# Patient Record
Sex: FEMALE | Race: WHITE | Employment: OTHER | URBAN - NONMETROPOLITAN AREA
[De-identification: names, ages, dates, MRNs, and addresses within clinical notes are randomized per-mention and may not be internally consistent; named-entity substitution may affect disease eponyms.]

---

## 2017-06-01 ENCOUNTER — OFFICE VISIT (OUTPATIENT)
Dept: NEUROLOGY | Age: 37
End: 2017-06-01
Payer: MEDICARE

## 2017-06-01 VITALS — OXYGEN SATURATION: 98 % | DIASTOLIC BLOOD PRESSURE: 84 MMHG | SYSTOLIC BLOOD PRESSURE: 131 MMHG | HEART RATE: 85 BPM

## 2017-06-01 DIAGNOSIS — R41.89 COGNITIVE DEFICITS: ICD-10-CM

## 2017-06-01 DIAGNOSIS — R53.1 WEAKNESS: Primary | ICD-10-CM

## 2017-06-01 DIAGNOSIS — R25.2 SPASTICITY: ICD-10-CM

## 2017-06-01 DIAGNOSIS — F39 MOOD DISORDER (HCC): ICD-10-CM

## 2017-06-01 DIAGNOSIS — S06.9X9S TRAUMATIC BRAIN INJURY, WITH LOSS OF CONSCIOUSNESS OF UNSPECIFIED DURATION, SEQUELA: ICD-10-CM

## 2017-06-01 PROBLEM — S06.9XAA TRAUMATIC BRAIN INJURY: Status: ACTIVE | Noted: 2017-06-01

## 2017-06-01 PROCEDURE — G8421 BMI NOT CALCULATED: HCPCS | Performed by: PSYCHIATRY & NEUROLOGY

## 2017-06-01 PROCEDURE — G8427 DOCREV CUR MEDS BY ELIG CLIN: HCPCS | Performed by: PSYCHIATRY & NEUROLOGY

## 2017-06-01 PROCEDURE — 99204 OFFICE O/P NEW MOD 45 MIN: CPT | Performed by: PSYCHIATRY & NEUROLOGY

## 2017-06-01 PROCEDURE — 4004F PT TOBACCO SCREEN RCVD TLK: CPT | Performed by: PSYCHIATRY & NEUROLOGY

## 2017-06-01 RX ORDER — BACLOFEN 20 MG/1
20 TABLET ORAL 4 TIMES DAILY
COMMUNITY

## 2017-06-01 RX ORDER — LANOLIN ALCOHOL/MO/W.PET/CERES
3 CREAM (GRAM) TOPICAL NIGHTLY PRN
COMMUNITY

## 2017-06-01 RX ORDER — TOLTERODINE 4 MG/1
4 CAPSULE, EXTENDED RELEASE ORAL DAILY
COMMUNITY

## 2017-06-01 RX ORDER — FLUOXETINE HYDROCHLORIDE 20 MG/1
20 CAPSULE ORAL DAILY
COMMUNITY

## 2017-06-01 RX ORDER — GABAPENTIN 300 MG/1
300 CAPSULE ORAL 3 TIMES DAILY
COMMUNITY

## 2017-06-01 RX ORDER — PREDNISONE 1 MG/1
1 TABLET ORAL DAILY
COMMUNITY

## 2017-06-01 RX ORDER — IBUPROFEN 600 MG/1
600 TABLET ORAL EVERY 8 HOURS PRN
COMMUNITY

## 2017-06-01 RX ORDER — TRAZODONE HYDROCHLORIDE 150 MG/1
150 TABLET ORAL NIGHTLY
COMMUNITY

## 2017-06-01 RX ORDER — BACLOFEN 10 MG/1
10 TABLET ORAL 3 TIMES DAILY
COMMUNITY

## 2017-06-01 RX ORDER — DANTROLENE SODIUM 25 MG/1
25 CAPSULE ORAL 3 TIMES DAILY
COMMUNITY

## 2017-06-01 RX ORDER — LORAZEPAM 2 MG/1
2 TABLET ORAL NIGHTLY
COMMUNITY

## 2017-06-13 ENCOUNTER — HOSPITAL ENCOUNTER (OUTPATIENT)
Dept: PAIN MANAGEMENT | Age: 37
Discharge: HOME | End: 2017-06-13
Payer: MEDICARE

## 2017-06-13 VITALS
DIASTOLIC BLOOD PRESSURE: 70 MMHG | TEMPERATURE: 99.1 F | SYSTOLIC BLOOD PRESSURE: 104 MMHG | RESPIRATION RATE: 18 BRPM | HEART RATE: 97 BPM | OXYGEN SATURATION: 96 %

## 2017-06-13 PROCEDURE — 99203 OFFICE O/P NEW LOW 30 MIN: CPT

## 2017-06-13 RX ORDER — FLUOXETINE HYDROCHLORIDE 40 MG/1
40 CAPSULE ORAL DAILY
COMMUNITY

## 2017-06-13 ASSESSMENT — PAIN DESCRIPTION - FREQUENCY: FREQUENCY: CONTINUOUS

## 2017-06-13 ASSESSMENT — PAIN DESCRIPTION - PROGRESSION: CLINICAL_PROGRESSION: GRADUALLY WORSENING

## 2017-06-13 ASSESSMENT — PAIN DESCRIPTION - PAIN TYPE: TYPE: CHRONIC PAIN

## 2017-06-13 ASSESSMENT — PAIN DESCRIPTION - DIRECTION: RADIATING_TOWARDS: ALL OVER

## 2017-06-13 ASSESSMENT — PAIN SCALES - GENERAL: PAINLEVEL_OUTOF10: 9

## 2017-06-13 ASSESSMENT — PAIN DESCRIPTION - ONSET: ONSET: ON-GOING

## 2017-06-13 ASSESSMENT — PAIN DESCRIPTION - ORIENTATION: ORIENTATION: RIGHT;LEFT;LOWER;UPPER

## 2017-07-12 ENCOUNTER — HOSPITAL ENCOUNTER (OUTPATIENT)
Dept: PAIN MANAGEMENT | Age: 37
Discharge: HOME | End: 2017-07-12
Payer: MEDICARE

## 2017-07-12 DIAGNOSIS — R25.2 SPASTICITY: Chronic | ICD-10-CM

## 2017-07-12 PROCEDURE — 6360000002 HC RX W HCPCS

## 2017-07-12 PROCEDURE — 64646 CHEMODENERV TRUNK MUSC 1-5: CPT

## 2017-07-12 PROCEDURE — 64644 CHEMODENERV 1 EXTREM 5/> MUS: CPT

## 2017-07-12 PROCEDURE — 64642 CHEMODENERV 1 EXTREMITY 1-4: CPT

## 2017-07-12 PROCEDURE — 64616 CHEMODENERV MUSC NECK DYSTON: CPT

## 2017-07-12 PROCEDURE — 2500000003 HC RX 250 WO HCPCS

## 2017-07-12 RX ORDER — BUPIVACAINE HYDROCHLORIDE 2.5 MG/ML
INJECTION, SOLUTION EPIDURAL; INFILTRATION; INTRACAUDAL
Status: COMPLETED | OUTPATIENT
Start: 2017-07-12 | End: 2017-07-12

## 2017-07-12 RX ADMIN — BUPIVACAINE HYDROCHLORIDE 20 ML: 2.5 INJECTION, SOLUTION EPIDURAL; INFILTRATION; INTRACAUDAL at 14:30

## 2017-09-22 DIAGNOSIS — D69.6 THROMBOCYTOPENIA (HCC): Primary | ICD-10-CM

## 2017-10-03 ENCOUNTER — APPOINTMENT (OUTPATIENT)
Dept: LAB | Facility: HOSPITAL | Age: 37
End: 2017-10-03

## 2017-10-04 ENCOUNTER — APPOINTMENT (OUTPATIENT)
Dept: GENERAL RADIOLOGY | Facility: HOSPITAL | Age: 37
End: 2017-10-04

## 2017-10-04 ENCOUNTER — HOSPITAL ENCOUNTER (INPATIENT)
Facility: HOSPITAL | Age: 37
LOS: 15 days | Discharge: NURSING, REHAB OR LONG-TERM CARE | End: 2017-10-19
Attending: FAMILY MEDICINE | Admitting: INTERNAL MEDICINE

## 2017-10-04 DIAGNOSIS — R13.12 OROPHARYNGEAL DYSPHAGIA: Primary | ICD-10-CM

## 2017-10-04 DIAGNOSIS — Z74.09 IMPAIRED MOBILITY AND ADLS: ICD-10-CM

## 2017-10-04 DIAGNOSIS — Z78.9 IMPAIRED MOBILITY AND ADLS: ICD-10-CM

## 2017-10-04 DIAGNOSIS — Z74.09 MOBILITY IMPAIRED: ICD-10-CM

## 2017-10-04 DIAGNOSIS — R41.89 COGNITIVE IMPAIRMENT: ICD-10-CM

## 2017-10-04 PROBLEM — I63.9 CVA (CEREBRAL VASCULAR ACCIDENT) (HCC): Status: ACTIVE | Noted: 2017-10-04

## 2017-10-04 LAB
ANION GAP SERPL CALCULATED.3IONS-SCNC: 15 MMOL/L (ref 4–13)
ARTERIAL PATENCY WRIST A: POSITIVE
ATMOSPHERIC PRESS: 758 MMHG
BASE EXCESS BLDA CALC-SCNC: 3.2 MMOL/L (ref 0–2)
BASOPHILS # BLD AUTO: 0.05 10*3/MM3 (ref 0–0.2)
BASOPHILS NFR BLD AUTO: 0.4 % (ref 0–2)
BDY SITE: ABNORMAL
BODY TEMPERATURE: 37 C
BUN BLD-MCNC: 14 MG/DL (ref 5–21)
BUN/CREAT SERPL: 22.2 (ref 7–25)
CALCIUM SPEC-SCNC: 8.7 MG/DL (ref 8.4–10.4)
CHLORIDE SERPL-SCNC: 100 MMOL/L (ref 98–110)
CO2 SERPL-SCNC: 24 MMOL/L (ref 24–31)
CREAT BLD-MCNC: 0.63 MG/DL (ref 0.5–1.4)
D-LACTATE SERPL-SCNC: 2.2 MMOL/L (ref 0.5–2)
DEPRECATED RDW RBC AUTO: 52.8 FL (ref 40–54)
EOSINOPHIL # BLD AUTO: 0 10*3/MM3 (ref 0–0.7)
EOSINOPHIL NFR BLD AUTO: 0 % (ref 0–4)
ERYTHROCYTE [DISTWIDTH] IN BLOOD BY AUTOMATED COUNT: 15 % (ref 12–15)
GAS FLOW AIRWAY: 2 LPM
GFR SERPL CREATININE-BSD FRML MDRD: 106 ML/MIN/1.73
GLUCOSE BLD-MCNC: 113 MG/DL (ref 70–100)
HCO3 BLDA-SCNC: 26.3 MMOL/L (ref 20–26)
HCT VFR BLD AUTO: 36.8 % (ref 37–47)
HGB BLD-MCNC: 12.1 G/DL (ref 12–16)
HOLD SPECIMEN: NORMAL
IMM GRANULOCYTES # BLD: 0.05 10*3/MM3 (ref 0–0.03)
IMM GRANULOCYTES NFR BLD: 0.4 % (ref 0–5)
LYMPHOCYTES # BLD AUTO: 1.23 10*3/MM3 (ref 0.72–4.86)
LYMPHOCYTES NFR BLD AUTO: 9.7 % (ref 15–45)
Lab: ABNORMAL
MCH RBC QN AUTO: 31.7 PG (ref 28–32)
MCHC RBC AUTO-ENTMCNC: 32.9 G/DL (ref 33–36)
MCV RBC AUTO: 96.3 FL (ref 82–98)
MODALITY: ABNORMAL
MONOCYTES # BLD AUTO: 0.28 10*3/MM3 (ref 0.19–1.3)
MONOCYTES NFR BLD AUTO: 2.2 % (ref 4–12)
NEUTROPHILS # BLD AUTO: 11.13 10*3/MM3 (ref 1.87–8.4)
NEUTROPHILS NFR BLD AUTO: 87.3 % (ref 39–78)
NT-PROBNP SERPL-MCNC: 483 PG/ML (ref 0–450)
PCO2 BLDA: 34.3 MM HG (ref 35–45)
PCO2 TEMP ADJ BLD: 34.3 MM HG (ref 35–45)
PH BLDA: 7.49 PH UNITS (ref 7.35–7.45)
PH, TEMP CORRECTED: 7.49 PH UNITS (ref 7.35–7.45)
PLAT MORPH BLD: NORMAL
PLATELET # BLD AUTO: 170 10*3/MM3 (ref 130–400)
PMV BLD AUTO: 11.9 FL (ref 6–12)
PO2 BLDA: 80 MM HG (ref 83–108)
PO2 TEMP ADJ BLD: 80 MM HG (ref 83–108)
POTASSIUM BLD-SCNC: 4 MMOL/L (ref 3.5–5.3)
RBC # BLD AUTO: 3.82 10*6/MM3 (ref 4.2–5.4)
RBC MORPH BLD: NORMAL
SAO2 % BLDCOA: 94.7 % (ref 94–99)
SODIUM BLD-SCNC: 139 MMOL/L (ref 135–145)
VENTILATOR MODE: ABNORMAL
WBC MORPH BLD: NORMAL
WBC NRBC COR # BLD: 12.74 10*3/MM3 (ref 4.8–10.8)

## 2017-10-04 PROCEDURE — 83605 ASSAY OF LACTIC ACID: CPT | Performed by: FAMILY MEDICINE

## 2017-10-04 PROCEDURE — 87070 CULTURE OTHR SPECIMN AEROBIC: CPT | Performed by: FAMILY MEDICINE

## 2017-10-04 PROCEDURE — 83880 ASSAY OF NATRIURETIC PEPTIDE: CPT | Performed by: FAMILY MEDICINE

## 2017-10-04 PROCEDURE — 82803 BLOOD GASES ANY COMBINATION: CPT

## 2017-10-04 PROCEDURE — 80048 BASIC METABOLIC PNL TOTAL CA: CPT | Performed by: FAMILY MEDICINE

## 2017-10-04 PROCEDURE — 36600 WITHDRAWAL OF ARTERIAL BLOOD: CPT

## 2017-10-04 PROCEDURE — 71010 HC CHEST PA OR AP: CPT

## 2017-10-04 PROCEDURE — 85007 BL SMEAR W/DIFF WBC COUNT: CPT | Performed by: FAMILY MEDICINE

## 2017-10-04 PROCEDURE — 87040 BLOOD CULTURE FOR BACTERIA: CPT | Performed by: INTERNAL MEDICINE

## 2017-10-04 PROCEDURE — 85025 COMPLETE CBC W/AUTO DIFF WBC: CPT | Performed by: FAMILY MEDICINE

## 2017-10-04 PROCEDURE — 87205 SMEAR GRAM STAIN: CPT | Performed by: FAMILY MEDICINE

## 2017-10-04 PROCEDURE — 87086 URINE CULTURE/COLONY COUNT: CPT | Performed by: FAMILY MEDICINE

## 2017-10-04 PROCEDURE — 25010000002 ENOXAPARIN PER 10 MG: Performed by: FAMILY MEDICINE

## 2017-10-04 RX ORDER — ONDANSETRON 2 MG/ML
4 INJECTION INTRAMUSCULAR; INTRAVENOUS EVERY 6 HOURS PRN
Status: DISCONTINUED | OUTPATIENT
Start: 2017-10-04 | End: 2017-10-05 | Stop reason: SDUPTHER

## 2017-10-04 RX ORDER — DEXTROSE, SODIUM CHLORIDE, AND POTASSIUM CHLORIDE 5; .45; .15 G/100ML; G/100ML; G/100ML
100 INJECTION INTRAVENOUS CONTINUOUS
Status: DISCONTINUED | OUTPATIENT
Start: 2017-10-04 | End: 2017-10-12

## 2017-10-04 RX ORDER — ACETAMINOPHEN 650 MG/1
650 SUPPOSITORY RECTAL EVERY 4 HOURS PRN
Status: DISCONTINUED | OUTPATIENT
Start: 2017-10-04 | End: 2017-10-19 | Stop reason: HOSPADM

## 2017-10-04 RX ORDER — CLINDAMYCIN PHOSPHATE 300 MG/50ML
300 INJECTION INTRAVENOUS EVERY 8 HOURS
Status: DISCONTINUED | OUTPATIENT
Start: 2017-10-04 | End: 2017-10-13

## 2017-10-04 RX ORDER — SODIUM CHLORIDE 0.9 % (FLUSH) 0.9 %
1-10 SYRINGE (ML) INJECTION AS NEEDED
Status: DISCONTINUED | OUTPATIENT
Start: 2017-10-04 | End: 2017-10-19 | Stop reason: HOSPADM

## 2017-10-04 RX ADMIN — CLINDAMYCIN PHOSPHATE 300 MG: 6 INJECTION, SOLUTION INTRAVENOUS at 20:31

## 2017-10-04 RX ADMIN — ENOXAPARIN SODIUM 40 MG: 40 INJECTION SUBCUTANEOUS at 22:42

## 2017-10-04 RX ADMIN — DEXTROSE MONOHYDRATE, SODIUM CHLORIDE, AND POTASSIUM CHLORIDE 100 ML/HR: 50; 4.5; 1.49 INJECTION, SOLUTION INTRAVENOUS at 20:31

## 2017-10-04 NOTE — H&P
[facility] Services  HISTORY AND PHYSICAL    Date of Admission: 10/4/2017  Primary Care Physician: ROBERTH Beckham    Subjective     Chief Complaint: slurred speech, CVA at [facility], transferred for worsening respiratory issues, fever and PNA    History of Present Illness  Is a 36-year-old white female with known past history of traumatic brain injury and left-sided weakness essentially mobile only with assistance and in a motorized wheelchair who was taken to the hospital after having increasing left-sided weakness and slurring of her speech.  This had began sometime overnight from an unknown time frame.  Patient had initial head CT scan done at outlying facility that was stable with no acute changes however never revealed a fluid collection in the subdural space.  Patient was unable to tolerate an MRI due to some agitation therefore Ativan was given.  On arrival patient had been stable and initial CT of her chest was also fairly unremarkable.  Her vital signs had been okay as well as her labs with a normal white count and creatinine.  She did have some issues with low sodium around 1:30.  Patient did have some question of atypical pneumonia on presentation her CT chest and actual repeat CT of chest several days later did reveal worsening of this presentation.  She was being treated with IV Rocephin.  When she received some Ativan for sedation of MRI apparently her respiratory status digressed somewhat.  She began having increased secretions requiring frequent suctioning.  There was some concern she may continue to deteriorate and transfer was requested to our facility.  Patient was stable on nasal cannula in regards to her oxygen saturations.  She was febrile overnight at their facility again felt to be secondary to pneumonia.  Her MRI did reveal possible acute versus subacute infarct.  Again patient had not been treated with any TPA as her last normal was unknown.  On  arrival to our facility patient was stable in regards to her vital signs.  She was very congested with excessive secretions.  She was unable to have a meaningful conversation with me as her speech is garbled at this time.  Per the sitter who is with her and us with her her facility this is a change from her baseline however this is similar to how he saw her 2 days previously.  She does have a Walsh catheter in place.        Review of Systems   Unable to obtain as cannot understand patients speech      Past Medical History:   Past Medical History:   Diagnosis Date   • Depression    • Left-sided weakness    • Lupus    • MRSA (methicillin resistant staph aureus) culture positive    • TBI (traumatic brain injury)        Past Surgical History:  Past Surgical History:   Procedure Laterality Date   •  SECTION     • TRACHEOSTOMY         Social History:  reports that she has been smoking.  She does not have any smokeless tobacco history on file.    Family History: family history includes Hypertension in her mother.       Allergies:  Allergies not on file    Medications:  Prior to Admission medications    Not on File       Objective     Vital Signs: There were no vitals taken for this visit.  Physical Exam   Constitutional:   Congested, awake but no meaningful interaction, cannot communicate with me, no distress   HENT:   Head: Normocephalic and atraumatic.   Eyes: Conjunctivae are normal.   Neck: Neck supple. No JVD present. No thyromegaly present.   Cardiovascular: Normal rate, regular rhythm, normal heart sounds and intact distal pulses.  Exam reveals no gallop and no friction rub.    No murmur heard.  Pulmonary/Chest: Effort normal. No respiratory distress. She has wheezes. She has rales. She exhibits no tenderness.   Secretions, congested   Abdominal: Soft. Bowel sounds are normal. She exhibits no distension. There is no tenderness. There is no rebound and no guarding.   Musculoskeletal: Normal range of motion.  She exhibits no edema, tenderness or deformity.   Contractures of left arm. Moving right arm. Dressing over right elbow area with active drainage   Lymphadenopathy:     She has no cervical adenopathy.   Neurological: She is alert. She displays normal reflexes. No cranial nerve deficit. She exhibits normal muscle tone.   Skin: Skin is warm and dry. No rash noted.           Results Reviewed:  Lab Results (last 24 hours)     ** No results found for the last 24 hours. **        Imaging Results (last 24 hours)     ** No results found for the last 24 hours. **          I have personally reviewed and interpreted the radiology studies and ECG obtained at time of admission.     Assessment / Plan     Assessment & Plan  Hospital Problem List     CVA (cerebral vascular accident)        1. Hx of TBI-hard to know exact baseline  2. Reports of  Acute vs subacute CVA internal capsule from [facility] MRI. I . Sx happened sometime sat night overnight. No TPA administered. Consult PT/ST/OT. Consult neuro  3. Evidence of pna on CT chest at [facility]. Fever there reported overnight. Has been on rocephin there. Will check CXR here. Cultures. Give clinda for likely aspiration. Suctioning. Nebs. May benefit from vest therapy but evidence of rib fx's  4. Mildly displaced right rib fractures  5. History of htn-bp currently stable  6. Check abgs  7. Am labs        Code Status: full     I discussed the patients findings and my recommendations with sitter, nurse     Estimated length of stay 2-4 days    Latrice Guillen MD   10/04/17   6:28 PM

## 2017-10-05 ENCOUNTER — APPOINTMENT (OUTPATIENT)
Dept: ULTRASOUND IMAGING | Facility: HOSPITAL | Age: 37
End: 2017-10-05

## 2017-10-05 ENCOUNTER — APPOINTMENT (OUTPATIENT)
Dept: CARDIOLOGY | Facility: HOSPITAL | Age: 37
End: 2017-10-05
Attending: PSYCHIATRY & NEUROLOGY

## 2017-10-05 LAB
ANION GAP SERPL CALCULATED.3IONS-SCNC: 7 MMOL/L (ref 4–13)
ARTICHOKE IGE QN: 63 MG/DL (ref 0–99)
BASOPHILS # BLD AUTO: 0.01 10*3/MM3 (ref 0–0.2)
BASOPHILS NFR BLD AUTO: 0.1 % (ref 0–2)
BH CV ECHO MEAS - AO MAX PG (FULL): 3.3 MMHG
BH CV ECHO MEAS - AO MAX PG: 7.6 MMHG
BH CV ECHO MEAS - AO MEAN PG (FULL): 1 MMHG
BH CV ECHO MEAS - AO MEAN PG: 3 MMHG
BH CV ECHO MEAS - AO ROOT AREA (BSA CORRECTED): 1.5
BH CV ECHO MEAS - AO ROOT AREA: 5.5 CM^2
BH CV ECHO MEAS - AO ROOT DIAM: 2.7 CM
BH CV ECHO MEAS - AO V2 MAX: 138 CM/SEC
BH CV ECHO MEAS - AO V2 MEAN: 76.7 CM/SEC
BH CV ECHO MEAS - AO V2 VTI: 28.9 CM
BH CV ECHO MEAS - AVA(I,A): 3.6 CM^2
BH CV ECHO MEAS - AVA(I,D): 3.6 CM^2
BH CV ECHO MEAS - AVA(V,A): 3.1 CM^2
BH CV ECHO MEAS - AVA(V,D): 3.1 CM^2
BH CV ECHO MEAS - BSA(HAYCOCK): 1.8 M^2
BH CV ECHO MEAS - BSA: 1.8 M^2
BH CV ECHO MEAS - BZI_BMI: 22.9 KILOGRAMS/M^2
BH CV ECHO MEAS - BZI_METRIC_HEIGHT: 170.2 CM
BH CV ECHO MEAS - BZI_METRIC_WEIGHT: 66.2 KG
BH CV ECHO MEAS - CONTRAST EF 4CH: 70.1 ML/M^2
BH CV ECHO MEAS - EDV(CUBED): 53.2 ML
BH CV ECHO MEAS - EDV(MOD-SP4): 94.6 ML
BH CV ECHO MEAS - EDV(TEICH): 60.4 ML
BH CV ECHO MEAS - EF(CUBED): 80.2 %
BH CV ECHO MEAS - EF(TEICH): 73.5 %
BH CV ECHO MEAS - ESV(CUBED): 10.5 ML
BH CV ECHO MEAS - ESV(MOD-SP4): 28.3 ML
BH CV ECHO MEAS - ESV(TEICH): 16 ML
BH CV ECHO MEAS - FS: 41.8 %
BH CV ECHO MEAS - IVS/LVPW: 0.83
BH CV ECHO MEAS - IVSD: 1.1 CM
BH CV ECHO MEAS - LA DIMENSION: 2.7 CM
BH CV ECHO MEAS - LA/AO: 1
BH CV ECHO MEAS - LAT PEAK E' VEL: 8.3 CM/SEC
BH CV ECHO MEAS - LV DIASTOLIC VOL/BSA (35-75): 53.5 ML/M^2
BH CV ECHO MEAS - LV MASS(C)D: 155.6 GRAMS
BH CV ECHO MEAS - LV MASS(C)DI: 88 GRAMS/M^2
BH CV ECHO MEAS - LV MAX PG: 4.3 MMHG
BH CV ECHO MEAS - LV MEAN PG: 2 MMHG
BH CV ECHO MEAS - LV SYSTOLIC VOL/BSA (12-30): 16 ML/M^2
BH CV ECHO MEAS - LV V1 MAX: 104 CM/SEC
BH CV ECHO MEAS - LV V1 MEAN: 67.7 CM/SEC
BH CV ECHO MEAS - LV V1 VTI: 24.9 CM
BH CV ECHO MEAS - LVIDD: 3.8 CM
BH CV ECHO MEAS - LVIDS: 2.2 CM
BH CV ECHO MEAS - LVLD AP4: 6.7 CM
BH CV ECHO MEAS - LVLS AP4: 5.9 CM
BH CV ECHO MEAS - LVOT AREA (M): 4.2 CM^2
BH CV ECHO MEAS - LVOT AREA: 4.2 CM^2
BH CV ECHO MEAS - LVOT DIAM: 2.3 CM
BH CV ECHO MEAS - LVPWD: 1.3 CM
BH CV ECHO MEAS - MED PEAK E' VEL: 10.2 CM/SEC
BH CV ECHO MEAS - MV A MAX VEL: 67.9 CM/SEC
BH CV ECHO MEAS - MV DEC TIME: 0.26 SEC
BH CV ECHO MEAS - MV E MAX VEL: 67.9 CM/SEC
BH CV ECHO MEAS - MV E/A: 1
BH CV ECHO MEAS - SI(AO): 90.1 ML/M^2
BH CV ECHO MEAS - SI(CUBED): 24.1 ML/M^2
BH CV ECHO MEAS - SI(LVOT): 58.5 ML/M^2
BH CV ECHO MEAS - SI(MOD-SP4): 37.5 ML/M^2
BH CV ECHO MEAS - SI(TEICH): 25.1 ML/M^2
BH CV ECHO MEAS - SV(AO): 159.4 ML
BH CV ECHO MEAS - SV(CUBED): 42.7 ML
BH CV ECHO MEAS - SV(LVOT): 103.5 ML
BH CV ECHO MEAS - SV(MOD-SP4): 66.3 ML
BH CV ECHO MEAS - SV(TEICH): 44.4 ML
BUN BLD-MCNC: 18 MG/DL (ref 5–21)
BUN/CREAT SERPL: 34.6 (ref 7–25)
CALCIUM SPEC-SCNC: 8.3 MG/DL (ref 8.4–10.4)
CHLORIDE SERPL-SCNC: 105 MMOL/L (ref 98–110)
CHOLEST SERPL-MCNC: 116 MG/DL (ref 130–200)
CO2 SERPL-SCNC: 26 MMOL/L (ref 24–31)
CREAT BLD-MCNC: 0.52 MG/DL (ref 0.5–1.4)
D-LACTATE SERPL-SCNC: 1.3 MMOL/L (ref 0.5–2)
DEPRECATED RDW RBC AUTO: 51.2 FL (ref 40–54)
E/E' RATIO: 6.7
EOSINOPHIL # BLD AUTO: 0 10*3/MM3 (ref 0–0.7)
EOSINOPHIL NFR BLD AUTO: 0 % (ref 0–4)
ERYTHROCYTE [DISTWIDTH] IN BLOOD BY AUTOMATED COUNT: 14.9 % (ref 12–15)
GFR SERPL CREATININE-BSD FRML MDRD: 132 ML/MIN/1.73
GLUCOSE BLD-MCNC: 99 MG/DL (ref 70–100)
GLUCOSE BLDC GLUCOMTR-MCNC: 107 MG/DL (ref 70–130)
GLUCOSE BLDC GLUCOMTR-MCNC: 108 MG/DL (ref 70–130)
HBA1C MFR BLD: 5.4 %
HCT VFR BLD AUTO: 32.7 % (ref 37–47)
HDLC SERPL-MCNC: 24 MG/DL
HGB BLD-MCNC: 10.8 G/DL (ref 12–16)
IMM GRANULOCYTES # BLD: 0.05 10*3/MM3 (ref 0–0.03)
IMM GRANULOCYTES NFR BLD: 0.5 % (ref 0–5)
LDLC/HDLC SERPL: 2.56 {RATIO}
LYMPHOCYTES # BLD AUTO: 2.07 10*3/MM3 (ref 0.72–4.86)
LYMPHOCYTES NFR BLD AUTO: 19.8 % (ref 15–45)
MCH RBC QN AUTO: 31.8 PG (ref 28–32)
MCHC RBC AUTO-ENTMCNC: 33 G/DL (ref 33–36)
MCV RBC AUTO: 96.2 FL (ref 82–98)
MONOCYTES # BLD AUTO: 1.13 10*3/MM3 (ref 0.19–1.3)
MONOCYTES NFR BLD AUTO: 10.8 % (ref 4–12)
NEUTROPHILS # BLD AUTO: 7.21 10*3/MM3 (ref 1.87–8.4)
NEUTROPHILS NFR BLD AUTO: 68.8 % (ref 39–78)
NRBC BLD MANUAL-RTO: 0 /100 WBC (ref 0–0)
PLAT MORPH BLD: NORMAL
PLATELET # BLD AUTO: 161 10*3/MM3 (ref 130–400)
PMV BLD AUTO: 12 FL (ref 6–12)
POTASSIUM BLD-SCNC: 3.7 MMOL/L (ref 3.5–5.3)
RBC # BLD AUTO: 3.4 10*6/MM3 (ref 4.2–5.4)
RBC MORPH BLD: NORMAL
SODIUM BLD-SCNC: 138 MMOL/L (ref 135–145)
TRIGL SERPL-MCNC: 153 MG/DL (ref 0–149)
WBC MORPH BLD: NORMAL
WBC NRBC COR # BLD: 10.47 10*3/MM3 (ref 4.8–10.8)

## 2017-10-05 PROCEDURE — 85025 COMPLETE CBC W/AUTO DIFF WBC: CPT | Performed by: FAMILY MEDICINE

## 2017-10-05 PROCEDURE — 87070 CULTURE OTHR SPECIMN AEROBIC: CPT | Performed by: FAMILY MEDICINE

## 2017-10-05 PROCEDURE — 80048 BASIC METABOLIC PNL TOTAL CA: CPT | Performed by: FAMILY MEDICINE

## 2017-10-05 PROCEDURE — 83605 ASSAY OF LACTIC ACID: CPT | Performed by: FAMILY MEDICINE

## 2017-10-05 PROCEDURE — 87205 SMEAR GRAM STAIN: CPT | Performed by: FAMILY MEDICINE

## 2017-10-05 PROCEDURE — 92610 EVALUATE SWALLOWING FUNCTION: CPT | Performed by: SPEECH-LANGUAGE PATHOLOGIST

## 2017-10-05 PROCEDURE — 82962 GLUCOSE BLOOD TEST: CPT

## 2017-10-05 PROCEDURE — 97167 OT EVAL HIGH COMPLEX 60 MIN: CPT | Performed by: OCCUPATIONAL THERAPIST

## 2017-10-05 PROCEDURE — 99223 1ST HOSP IP/OBS HIGH 75: CPT | Performed by: PSYCHIATRY & NEUROLOGY

## 2017-10-05 PROCEDURE — 93880 EXTRACRANIAL BILAT STUDY: CPT | Performed by: SURGERY

## 2017-10-05 PROCEDURE — G8978 MOBILITY CURRENT STATUS: HCPCS | Performed by: PHYSICAL THERAPIST

## 2017-10-05 PROCEDURE — 25010000002 FUROSEMIDE PER 20 MG: Performed by: FAMILY MEDICINE

## 2017-10-05 PROCEDURE — G8979 MOBILITY GOAL STATUS: HCPCS | Performed by: PHYSICAL THERAPIST

## 2017-10-05 PROCEDURE — G8980 MOBILITY D/C STATUS: HCPCS | Performed by: PHYSICAL THERAPIST

## 2017-10-05 PROCEDURE — 83036 HEMOGLOBIN GLYCOSYLATED A1C: CPT | Performed by: FAMILY MEDICINE

## 2017-10-05 PROCEDURE — 25010000002 ENOXAPARIN PER 10 MG: Performed by: FAMILY MEDICINE

## 2017-10-05 PROCEDURE — 97163 PT EVAL HIGH COMPLEX 45 MIN: CPT

## 2017-10-05 PROCEDURE — 93306 TTE W/DOPPLER COMPLETE: CPT

## 2017-10-05 PROCEDURE — 80061 LIPID PANEL: CPT | Performed by: FAMILY MEDICINE

## 2017-10-05 PROCEDURE — G8996 SWALLOW CURRENT STATUS: HCPCS | Performed by: SPEECH-LANGUAGE PATHOLOGIST

## 2017-10-05 PROCEDURE — 85007 BL SMEAR W/DIFF WBC COUNT: CPT | Performed by: FAMILY MEDICINE

## 2017-10-05 PROCEDURE — G8997 SWALLOW GOAL STATUS: HCPCS | Performed by: SPEECH-LANGUAGE PATHOLOGIST

## 2017-10-05 PROCEDURE — G8988 SELF CARE GOAL STATUS: HCPCS | Performed by: OCCUPATIONAL THERAPIST

## 2017-10-05 PROCEDURE — 93880 EXTRACRANIAL BILAT STUDY: CPT

## 2017-10-05 PROCEDURE — G8987 SELF CARE CURRENT STATUS: HCPCS | Performed by: OCCUPATIONAL THERAPIST

## 2017-10-05 PROCEDURE — 93306 TTE W/DOPPLER COMPLETE: CPT | Performed by: INTERNAL MEDICINE

## 2017-10-05 RX ORDER — LORAZEPAM 2 MG/1
2 TABLET ORAL NIGHTLY
COMMUNITY
End: 2017-10-19 | Stop reason: HOSPADM

## 2017-10-05 RX ORDER — MEDROXYPROGESTERONE ACETATE 150 MG/ML
150 INJECTION, SUSPENSION INTRAMUSCULAR
COMMUNITY
End: 2017-10-19 | Stop reason: HOSPADM

## 2017-10-05 RX ORDER — IBUPROFEN 600 MG/1
600 TABLET ORAL NIGHTLY
COMMUNITY
End: 2017-10-19 | Stop reason: HOSPADM

## 2017-10-05 RX ORDER — FUROSEMIDE 10 MG/ML
20 INJECTION INTRAMUSCULAR; INTRAVENOUS DAILY
Status: DISCONTINUED | OUTPATIENT
Start: 2017-10-05 | End: 2017-10-11

## 2017-10-05 RX ORDER — TRAZODONE HYDROCHLORIDE 150 MG/1
150 TABLET ORAL NIGHTLY
COMMUNITY
End: 2017-10-19 | Stop reason: HOSPADM

## 2017-10-05 RX ORDER — FLUOXETINE 10 MG/1
10 CAPSULE ORAL DAILY
COMMUNITY

## 2017-10-05 RX ORDER — FLUOXETINE HYDROCHLORIDE 60 MG/1
10 TABLET, FILM COATED ORAL; ORAL DAILY
Status: ON HOLD | COMMUNITY
End: 2017-10-05

## 2017-10-05 RX ORDER — ASPIRIN 300 MG/1
300 SUPPOSITORY RECTAL DAILY
Status: DISCONTINUED | OUTPATIENT
Start: 2017-10-05 | End: 2017-10-19 | Stop reason: HOSPADM

## 2017-10-05 RX ORDER — GABAPENTIN 600 MG/1
600 TABLET ORAL 3 TIMES DAILY
COMMUNITY
End: 2017-10-19 | Stop reason: HOSPADM

## 2017-10-05 RX ORDER — IBUPROFEN 600 MG/1
600 TABLET ORAL EVERY 8 HOURS PRN
COMMUNITY
End: 2017-10-19 | Stop reason: HOSPADM

## 2017-10-05 RX ORDER — ONDANSETRON 2 MG/ML
4 INJECTION INTRAMUSCULAR; INTRAVENOUS EVERY 6 HOURS PRN
Status: DISCONTINUED | OUTPATIENT
Start: 2017-10-05 | End: 2017-10-19 | Stop reason: HOSPADM

## 2017-10-05 RX ORDER — BACLOFEN 20 MG/1
30 TABLET ORAL 2 TIMES DAILY
COMMUNITY
End: 2017-10-19 | Stop reason: HOSPADM

## 2017-10-05 RX ORDER — LABETALOL HYDROCHLORIDE 5 MG/ML
10 INJECTION, SOLUTION INTRAVENOUS
Status: DISCONTINUED | OUTPATIENT
Start: 2017-10-05 | End: 2017-10-19 | Stop reason: HOSPADM

## 2017-10-05 RX ORDER — PREDNISONE 1 MG/1
3 TABLET ORAL DAILY
COMMUNITY

## 2017-10-05 RX ORDER — POTASSIUM CITRATE 10 MEQ/1
10 TABLET, EXTENDED RELEASE ORAL 2 TIMES DAILY
COMMUNITY
End: 2017-10-19 | Stop reason: HOSPADM

## 2017-10-05 RX ORDER — ASPIRIN 81 MG/1
81 TABLET, CHEWABLE ORAL DAILY
Status: DISCONTINUED | OUTPATIENT
Start: 2017-10-05 | End: 2017-10-19 | Stop reason: HOSPADM

## 2017-10-05 RX ORDER — POLYVINYL ALCOHOL 14 MG/ML
2 SOLUTION/ DROPS OPHTHALMIC NIGHTLY
COMMUNITY

## 2017-10-05 RX ORDER — CHOLECALCIFEROL (VITAMIN D3) 125 MCG
5 CAPSULE ORAL NIGHTLY
COMMUNITY

## 2017-10-05 RX ORDER — SODIUM CHLORIDE 0.9 % (FLUSH) 0.9 %
1-10 SYRINGE (ML) INJECTION AS NEEDED
Status: DISCONTINUED | OUTPATIENT
Start: 2017-10-05 | End: 2017-10-19 | Stop reason: HOSPADM

## 2017-10-05 RX ORDER — BACLOFEN 20 MG/1
20 TABLET ORAL DAILY
COMMUNITY
End: 2017-10-19 | Stop reason: HOSPADM

## 2017-10-05 RX ORDER — TOLTERODINE 4 MG/1
4 CAPSULE, EXTENDED RELEASE ORAL DAILY
COMMUNITY

## 2017-10-05 RX ORDER — SENNA PLUS 8.6 MG/1
2 TABLET ORAL DAILY PRN
COMMUNITY

## 2017-10-05 RX ORDER — DANTROLENE SODIUM 25 MG/1
25 CAPSULE ORAL 3 TIMES DAILY
COMMUNITY

## 2017-10-05 RX ADMIN — CLINDAMYCIN PHOSPHATE 300 MG: 6 INJECTION, SOLUTION INTRAVENOUS at 10:24

## 2017-10-05 RX ADMIN — CLINDAMYCIN PHOSPHATE 300 MG: 6 INJECTION, SOLUTION INTRAVENOUS at 03:22

## 2017-10-05 RX ADMIN — FUROSEMIDE 20 MG: 10 INJECTION, SOLUTION INTRAMUSCULAR; INTRAVENOUS at 08:55

## 2017-10-05 RX ADMIN — ACETAMINOPHEN 650 MG: 650 SUPPOSITORY RECTAL at 14:16

## 2017-10-05 RX ADMIN — ACETAMINOPHEN 650 MG: 650 SUPPOSITORY RECTAL at 04:39

## 2017-10-05 RX ADMIN — ASPIRIN 300 MG: 300 SUPPOSITORY RECTAL at 10:59

## 2017-10-05 RX ADMIN — DEXTROSE MONOHYDRATE, SODIUM CHLORIDE, AND POTASSIUM CHLORIDE 100 ML/HR: 50; 4.5; 1.49 INJECTION, SOLUTION INTRAVENOUS at 10:24

## 2017-10-05 RX ADMIN — CLINDAMYCIN PHOSPHATE 300 MG: 6 INJECTION, SOLUTION INTRAVENOUS at 18:58

## 2017-10-05 RX ADMIN — ENOXAPARIN SODIUM 40 MG: 40 INJECTION SUBCUTANEOUS at 10:23

## 2017-10-05 NOTE — THERAPY EVALUATION
"Acute Care - Speech Language Pathology   Swallow Initial Evaluation [facility]     Patient Name: Pallavi Yee  : 1980  MRN: [id number]  Today's Date: 10/5/2017        Referring Physician: Emelina      Admit Date: 10/4/2017    SPEECH-LANGUAGE PATHOLOGY EVALUATION - SWALLOW  Subjective: The patient was seen on this date for a Clinical Swallow evaluation.  Patient was poorly arousable.    The patient's history is significant for acute CVA, hx TBI with left sided weakness, lupus. Caregiver reports increased weakness on left side. Per Caregiver, pt was on regular diet prior to CVA and able to communicate effectively at approx. 75% intelligibility.   Objective: Textures given included ice chip stimulation  Assessment: Difficulties were noted with ice chips. Pt noted to have thick secretions that she was unable to control. Frequent suctioning required. Anterior loss of secretions. Pt did open eyes, however, did not attempt to follow commands. Ice chip rub given with no response from pt. Pt did state at one point \"leave me alone.\" Speech is slurred and difficult to understand. ST feels pt is unsafe for PO at this time due to difficulty tolerating secretions. ST recommends continued NPO with meds alternate route. Due to reports of pt having difficulty with secretions for past several days, ST feels pt will most likely benefit from BELKIS. ST provided pt with communication board and follow up with speech/language eval to be completed.   SLP Findings:  Patient presents with profound\" oropharyngeal dysphagia, without esophageal component.   Recommendations: Diet Textures: NPO.  Medications should be taken by alternate means.  Recommended Strategies:  Oral care.  Other Recommended Evaluations: Re-evaluation at bedside and Speech-Language Evaluation    Dysphagia therapy is recommended.   Candice Aguirre, CCC-SLP 10/5/2017 9:17 AM      Visit Dx:     ICD-10-CM ICD-9-CM   1. Oropharyngeal dysphagia R13.12 787.22 " "    Patient Active Problem List   Diagnosis   • CVA (cerebral vascular accident)     Past Medical History:   Diagnosis Date   • Depression    • Left-sided weakness    • Lupus    • MRSA (methicillin resistant staph aureus) culture positive    • TBI (traumatic brain injury)      Past Surgical History:   Procedure Laterality Date   •  SECTION     • TRACHEOSTOMY            SWALLOW EVALUATION (last 72 hours)      Swallow Evaluation       10/05/17 0815                Rehab Evaluation    Document Type evaluation  -BN        Subjective Information decreased LOC  -BN        General Information    Patient Profile Review yes  -BN        Onset of Illness/Injury 10/04/17  -BN        Referring Physician Ruxor  -BN        Subjective Patient Observations Pt awakens and did voice \"leave me alone.\" Vocalizations are slurred and difficult to understand  -BN        Pertinent History Of Current Problem Acute CVA, hx TBI, lupus  -BN        Current Diet Limitations NPO  -BN        Precautions/Limitations, Vision WFL with corrective lenses  -BN        Precautions/Limitations, Hearing other (see comments)   unable to fully assess  -BN        Prior Level of Function- Communication functional in a familiar environment/with familiar caregiver;other (comment)   Caregiver stated pt was 75% intelligible   -BN        Prior Level of Function- Swallowing no diet consistency restrictions  -BN        Plans/Goals Discussed With other   Caregiver  -BN        Barriers to Rehab medically complex;cognitive status  -BN        Clinical Impression    Patient's Goals For Discharge patient could not state  -BN        Family Goals For Discharge other (see comments);patient able to return to PO diet   Caregiver  -BN        SLP Swallowing Diagnosis severe dysphagia;pharyngeal dysfunction;oral dysfunction  -BN        Rehab Potential/Prognosis, Swallowing fair, will monitor progress closely  -BN        Criteria for Skilled Therapeutic Interventions Met " "skilled criteria for dysphagia intervention met  -BN        FCM, Swallowing 2-->Level 2  -BN        Therapy Frequency 3-5 times/wk  -BN        Predicted Duration Therapy Interv (days) until discharge  -BN        Expected Duration Therapy Session (min) 15-30 minutes  -BN        SLP Diet Recommendation NPO: unsafe for food/liquid intake  -BN        Recommended Diagnostics reassess via clinical swallow (non-instrumental exam)  -        SLP Rec. for Method of Medication Administration meds via alternate route  -BN        Monitor For Signs Of Aspiration cough;gurgly voice;throat clearing;pneumonia;right lower lobe infiltrates;fever  -BN        Anticipated Discharge Disposition other (see comments)   Unknown at this time  -BN        Pain Assessment    Pain Assessment Unable to assess  -BN        Vision Assessment/Intervention    Visual Impairment WFL with corrective lenses   Caregiver reports pt does wear glasses  -BN        Cognitive Assessment/Intervention    Current Cognitive/Communication Assessment impaired  -BN        Follows Commands/Answers Questions unable/difficult to assess  -BN        Oral Motor Structure and Function    Dentition Assessment present and adequate  -BN        Secretion Management wet vocal quality;problems swallowing secretions;requires suctioning to control secretions;gagging or choking on secretions  -BN        Mucosal Quality sticky  -BN        Oral Musculature General Assessment other (see comments)   Did not follow commands to complete  -BN        Oral Motor Assessment Comment Pt noted to have profound amount of oral secretions that requires frequent suctioning. Anterior loss of secretions as well. Pt did not follow directions in order to fully assess oral motor mechanism. Pt stated \"leave me alone.\"   -BN        General Feeding/Swallowing Observations    Current Feeding Method NPO  -BN        Respiratory Support Currently in Use nasal cannula in use  -BN        Clinical Swallow Exam    " "Oral Preparation Concerns unable to assess  -BN        Oral Transit Concerns unable to assess  -BN        Pharyngeal Phase Results unable to assess;susupected impaired pharyngeal phase of swallowing  -BN        Summary of Clinical Exam ST bedside eval completed. Pt noted to have thick secretions that she was unable to control. Frequent suctioning required. Anterior loss of secretions. Pt did open eyes, however, did not attempt to follow commands. Ice chip rub given with no response from pt. Pt did state at one point \"leave me alone.\" Speech is slurred and difficult to understand. ST feels pt is unsafe for PO at this time due to difficulty tolerating secretions. ST recommends continued NPO with meds alternate route. Due to reports of pt having difficulty with secretions for past several days, ST feels pt will most likely benefit from BELKIS. ST provided pt with communication board and follow up with speech/language eval to be completed.   -BN        Swallow Recommendations    Oral Care oral care with toothbrush and dentifrice BID and PRN  -BN        Other Recommendations repeat clincial exam  -BN        Recommended Diet NPO: unsafe for food/liquid intake  -BN        Oral Therapeutic Exercise Program lingual exercise, focus on lateralization;lingual exercise, focus on anterior/posterior movement;lingual exercise, focus on elevation  -BN        Pharyngeal Therapeutic Exercise Program cold bolus;sour bolus  -BN        Dysphagia Treatment Objectives and Progress    Dysphagia Treatment Objectives Improve oral skills;Improve timing of pharyngeal response  -BN        Improve oral skills    To Improve Oral Skills, patient will: Increase lip closure;Increase tongue tip elevation;Increase tongue lateralization;Increase tongue A-P movement;90%;without cues;Increase back of tongue control  -BN        Status: Improve Oral Skills New  -BN        Oral Skills Progress continue to adress  -BN        Improve timing of pharyngeal " "response    To improve timing of pharyngeal response, patient will: Swallow in timely way after sensory input;90%;without cues  -BN        Status: Improve timing of pharyngeal response New  -BN        Timing of Pharyngeal Response Progress 90%;without cues  -BN          User Key  (r) = Recorded By, (t) = Taken By, (c) = Cosigned By    Initials Name Effective Dates    JOHN Aguirre CCC-SLP 08/02/16 -         EDUCATION  The patient has been educated in the following areas:   Dysphagia (Swallowing Impairment).    SLP Recommendation and Plan  SLP Swallowing Diagnosis: severe dysphagia, pharyngeal dysfunction, oral dysfunction  SLP Diet Recommendation: NPO: unsafe for food/liquid intake     SLP Rec. for Method of Medication Administration: meds via alternate route  Monitor For Signs Of Aspiration: cough, gurgly voice, throat clearing, pneumonia, right lower lobe infiltrates, fever  Recommended Diagnostics: reassess via clinical swallow (non-instrumental exam)  Criteria for Skilled Therapeutic Interventions Met: skilled criteria for dysphagia intervention met  Anticipated Discharge Disposition: other (see comments) (Unknown at this time)  Rehab Potential/Prognosis, Swallowing: fair, will monitor progress closely  Therapy Frequency: 3-5 times/wk             Plan of Care Review  Plan Of Care Reviewed With: caregiver  Outcome Summary/Follow up Plan:  bedside eval completed. Pt noted to have thick secretions that she was unable to control. Frequent suctioning required. Anterior loss of secretions. Pt did open eyes, however, did not attempt to follow commands. Ice chip rub given with no response from pt. Pt did state at one point \"leave me alone.\" Speech is slurred and difficult to understand. ST feels pt is unsafe for PO at this time due to difficulty tolerating secretions. ST recommends continued NPO with meds alternate route. Due to reports of pt having difficulty with secretions for past several days, ST " feels pt will most likely benefit from BELKIS. ST provided pt with communication board and follow up with speech/language eval to be completed.           IP SLP Goals       10/05/17 0910          Begin to Take Some PO Safely    Begin to Take Some PO Safely- SLP, Date Established 10/05/17  -BN      Begin to Take Some PO Safely- SLP, Time to Achieve by discharge  -BN      Begin to Take Some PO Safely- SLP, Activity Level Patient will improve oral skills for more efficient eating;Patient will improve timing of pharyngeal response for safer, more efficient swallow  -BN      Begin to Take Some PO Safely- SLP, Additional Goal Pt will tolerate PO trials with SLP without s/s of aspiration  -BN      Begin to Take Some PO Safely- SLP, Date Goal Reviewed 10/05/17  -BN      Begin to Take Some PO Safely- SLP, Outcome goal ongoing  -BN        User Key  (r) = Recorded By, (t) = Taken By, (c) = Cosigned By    Initials Name Provider Type    JOHN Aguirre CCC-SLP Speech and Language Pathologist             SLP Outcome Measures (last 72 hours)      SLP Outcome Measures       10/05/17 0900          SLP Outcome Measures    Outcome Measure Used? Adult NOMS  -BN      FCM Scores    FCM Chosen Swallowing  -BN      Swallowing FCM Score 2  -BN        User Key  (r) = Recorded By, (t) = Taken By, (c) = Cosigned By    Initials Name Effective Dates    JOHN Aguirre CCC-SLP 08/02/16 -            Time Calculation:         Time Calculation- SLP       10/05/17 0912          Time Calculation- SLP    SLP Start Time 0815  -BN      SLP Stop Time 0925  -BN      SLP Time Calculation (min) 70 min  -BN      SLP Received On 10/05/17  -BN      SLP Goal Re-Cert Due Date 10/15/17  -BN        User Key  (r) = Recorded By, (t) = Taken By, (c) = Cosigned By    Initials Name Provider Type    JOHN Aguirre CCC-SLP Speech and Language Pathologist          Therapy Charges for Today     Code Description Service Date Service Provider  Modifiers Qty    92295424004 HC ST SWALLOWING CURRENT STATUS 10/5/2017 TINA DrakeSLP GN, CM 1    65047154601 HC ST SWALLOWING PROJECTED 10/5/2017 YVETTE Drake GN, CL 1    99194229640 HC ST EVAL ORAL PHARYNG SWALLOW 5 10/5/2017 YVETTE Drake GN 1          SLP G-Codes  SLP NOMS Used?: Yes  Functional Limitations: Swallowing  Swallow Current Status (): At least 80 percent but less than 100 percent impaired, limited or restricted  Swallow Goal Status (): At least 60 percent but less than 80 percent impaired, limited or restricted    YVETTE Drake  10/5/2017

## 2017-10-05 NOTE — PROGRESS NOTES
[facility] Medicine Services  INPATIENT PROGRESS NOTE    Length of Stay: 1  Date of Admission: 10/4/2017  Primary Care Physician: ROBERTH Beckham    Subjective   Chief Complaint: AMS, slurred speech, secretions  HPI   Patient is really not able to answer my questions due to her inability to communicate with me in slurred speech at this time.  She continues to have significantly garbled speech and secretions although somewhat improved from yesterday.  She is spontaneously moving her upper [place] days however decrease of her left arm.  As had stable vital signs overnight.  She has Walsh catheter in place with good urine output.        Review of Systems     We will to obtain due to her current mental status    Objective    Temp:  [98.9 °F (37.2 °C)-99.3 °F (37.4 °C)] 98.9 °F (37.2 °C)  Heart Rate:  [78-91] 78  Resp:  [18-22] 20  BP: (129-148)/(62-79) 129/62  Physical Exam   Constitutional:   Awake, unable to communicate, garbled speech, congested, secretions   HENT:   Head: Normocephalic and atraumatic.   Eyes: Conjunctivae and EOM are normal. Pupils are equal, round, and reactive to light.   Neck: Neck supple. No JVD present. No thyromegaly present.   Cardiovascular: Normal rate, regular rhythm, normal heart sounds and intact distal pulses.  Exam reveals no gallop and no friction rub.    No murmur heard.  Pulmonary/Chest: Effort normal and breath sounds normal. No respiratory distress. She has no wheezes. She has no rales. She exhibits no tenderness.   Abdominal: Soft. Bowel sounds are normal. She exhibits no distension. There is no tenderness. There is no rebound and no guarding.   Genitourinary:   Genitourinary Comments: Walsh in place   Musculoskeletal: Normal range of motion. She exhibits no edema, tenderness or deformity.   Lymphadenopathy:     She has no cervical adenopathy.   Neurological: She is alert. She displays normal reflexes. No cranial nerve deficit. She exhibits  normal muscle tone.   Moving right arm, not answering questions or following commands, contractures of left extremities   Skin: Skin is warm and dry. No rash noted.           Results Review:  I have reviewed the labs, radiology results, and diagnostic studies.    Laboratory Data:     Results from last 7 days  Lab Units 10/05/17  0542 10/04/17  1933   WBC 10*3/mm3 10.47 12.74*   HEMOGLOBIN g/dL 10.8* 12.1   HEMATOCRIT % 32.7* 36.8*   PLATELETS 10*3/mm3 161 170          Results from last 7 days  Lab Units 10/05/17  0542 10/04/17  1933   SODIUM mmol/L 138 139   POTASSIUM mmol/L 3.7 4.0   CHLORIDE mmol/L 105 100   CO2 mmol/L 26.0 24.0   BUN mg/dL 18 14   CREATININE mg/dL 0.52 0.63   CALCIUM mg/dL 8.3* 8.7   GLUCOSE mg/dL 99 113*       Culture Data:   Blood Culture   Date Value Ref Range Status   10/04/2017 No growth at less than 24 hours  Preliminary   10/04/2017 No growth at less than 24 hours  Preliminary     Respiratory Culture   Date Value Ref Range Status   10/04/2017 Scant growth (1+) Normal Respiratory Jackie  Preliminary   10/04/2017 Scant growth (1+) Yeast isolated (A)  Preliminary       Radiology Data:   Imaging Results (last 24 hours)     Procedure Component Value Units Date/Time    XR Chest 1 View [[id number]] Collected:  10/04/17 2201     Updated:  10/04/17 2206    Narrative:       HISTORY: Congestion     CXR: Frontal view the chest performed.     There are no prior studies at this institution.     There are bilateral pulmonary opacities with a perihilar distribution.  Edema is favored. Heart appears mildly enlarged. There is no pleural  effusion pneumothorax. There are right sided rib fractures. No pleural  effusion or pneumothorax is visualized.        Impression:       1. Perihilar opacities are suspicious for edema. Mild cardiomegaly.  2. Right-sided rib fractures. No pneumothorax.  This report was finalized on 10/04/2017 22:03 by Dr. Liliana Alcantara MD.    US Carotid Bilateral [[id number]] Updated:   10/05/17 8445          I have reviewed the patient current medications.     Assessment/Plan     Hospital Problem List     CVA (cerebral vascular accident)          1. Concern for new cva based on presentation and MRI-neuro following. Echo pending, carotids pending. Neuro would like repeat mri here with better sedation, but apparently worsening of resp status at [facility] after ativan given.     2. Concern of aspiration pna-no fever, normal white count. Received rocephin x 2 days at [facility]. Started on clinda here. Cultures pending. Suctioning for secretions.    3. PT/OT/ST    4. Right rib fractures    5. Hx of TBI with significant underlying deficiencies                  Discharge Planning: I expect the patient to be discharged to facility in 2-4 days.    Latrice Guillen MD   10/05/17   1:36 PM

## 2017-10-05 NOTE — PLAN OF CARE
"Problem: Patient Care Overview (Adult)  Goal: Plan of Care Review  Outcome: Ongoing (interventions implemented as appropriate)    10/05/17 0911   Coping/Psychosocial Response Interventions   Plan Of Care Reviewed With caregiver   Outcome Evaluation   Outcome Summary/Follow up Plan  bedside eval completed. Pt noted to have thick secretions that she was unable to control. Frequent suctioning required. Anterior loss of secretions. Pt did open eyes, however, did not attempt to follow commands. Ice chip rub given with no response from pt. Pt did state at one point \"leave me alone.\" Speech is slurred and difficult to understand. ST feels pt is unsafe for PO at this time due to difficulty tolerating secretions. ST recommends continued NPO with meds alternate route. Due to reports of pt having difficulty with secretions for past several days, ST feels pt will most likely benefit from BELKIS. ST provided pt with communication board and follow up with speech/language eval to be completed.            "

## 2017-10-05 NOTE — PLAN OF CARE
Problem: Patient Care Overview (Adult)  Goal: Plan of Care Review    10/05/17 1414   Coping/Psychosocial Response Interventions   Plan Of Care Reviewed With patient   Patient Care Overview   Progress progress toward functional goals as expected   Outcome Evaluation   Outcome Summary/Follow up Plan Pt agreed to and completed physical therapy evaluation. Per employees of assisted living facility pt is dependent with all transfers and uses power w/c for ambulation. Pt was dependent for rolling in bed this session and only had active movement of RUE. Pt is not appropriate for physical therapy at this time. Will defer PROM to OT plan of care. Will reassess if change in status. Anticipated discharge to assisted living facility.
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
## 2017-10-05 NOTE — PLAN OF CARE
Problem: Patient Care Overview (Adult)  Goal: Plan of Care Review  Outcome: Ongoing (interventions implemented as appropriate)    10/05/17 0544   Coping/Psychosocial Response Interventions   Plan Of Care Reviewed With patient   Patient Care Overview   Progress no change   Outcome Evaluation   Outcome Summary/Follow up Plan Patient was screened sepsis +. Also had lactic acid 2.2. Received orders from Dr Cm last night. Repeat Lactic acid 1.3. Patient oriented to self only. NIH=19. Left arm flaccid, left hand contracted. History of stroke. Doesn't move left leg. Garbled speech. Became more cooperative early morning , like wanting to suction herself with yankur,. and apply chapstick to her lip herself. Coughs up foamy sputum. Lungs coarse. Suction prn. Spt culture, wound culture, and urine culture sent to lab this shift. Patient has puncture type abscess near right elbow, has mepilex to site. Has rodgers which was place while at [facility]. Reposition every 2 hours. Place in contact/spore precautions. Safety maintained. Still need to collect stool specimen for cdiff.        Goal: Adult Individualization and Mutuality  Outcome: Ongoing (interventions implemented as appropriate)  Goal: Discharge Needs Assessment  Outcome: Ongoing (interventions implemented as appropriate)    Problem: Stroke (Ischemic) (Adult)  Goal: Signs and Symptoms of Listed Potential Problems Will be Absent or Manageable (Stroke)  Outcome: Ongoing (interventions implemented as appropriate)    Problem: Fall Risk (Adult)  Goal: Absence of Falls  Outcome: Ongoing (interventions implemented as appropriate)    Problem: Wound, Traumatic, Nonburn (Adult)  Goal: Signs and Symptoms of Listed Potential Problems Will be Absent or Manageable (Wound, Traumatic, Nonburn)  Outcome: Ongoing (interventions implemented as appropriate)    Problem: Pressure Ulcer Risk (Chuy Scale) (Adult,Obstetrics,Pediatric)  Goal: Identify Related Risk Factors and Signs and  Symptoms  Outcome: Ongoing (interventions implemented as appropriate)  Goal: Skin Integrity  Outcome: Ongoing (interventions implemented as appropriate)

## 2017-10-05 NOTE — CONSULTS
Advance Care Planning/Advance Directive. I reviewed the purpose and goals for advance care planning consultation discussed with two representatives at bedside. The patient has a history of traumatic brain injury with current significant dysphasia. A patient representative reports the patient has guardian documentation filed at the assisted living facility where she is housed and a copy of this documentation will be forwarded to this facility to be scanned into the patient's EMR.      Time spent on preparation, facilitation, and documentation was 15 minutes.

## 2017-10-05 NOTE — PLAN OF CARE
Problem: Inpatient SLP  Goal: Dysphagia- Patient will improve swallowing skills to begin to take some PO safely  Outcome: Ongoing (interventions implemented as appropriate)    10/05/17 0910   Begin to Take Some PO Safely   Begin to Take Some PO Safely- SLP, Date Established 10/05/17   Begin to Take Some PO Safely- SLP, Time to Achieve by discharge   Begin to Take Some PO Safely- SLP, Activity Level Patient will improve oral skills for more efficient eating;Patient will improve timing of pharyngeal response for safer, more efficient swallow   Begin to Take Some PO Safely- SLP, Additional Goal Pt will tolerate PO trials with SLP without s/s of aspiration   Begin to Take Some PO Safely- SLP, Date Goal Reviewed 10/05/17   Begin to Take Some PO Safely- SLP, Outcome goal ongoing

## 2017-10-05 NOTE — THERAPY DISCHARGE NOTE
Acute Care - Physical Therapy Initial Eval/Discharge  [facility]     Patient Name: Pallavi Yee  : 1980  MRN: [id number]  Today's Date: 10/5/2017   Onset of Illness/Injury or Date of Surgery Date: 10/04/17  Date of Referral to PT: 10/04/17  Referring Physician: Dr. Guillen      Admit Date: 10/4/2017    Visit Dx:    ICD-10-CM ICD-9-CM   1. Oropharyngeal dysphagia R13.12 787.22   2. Mobility impaired Z74.09 799.89     Patient Active Problem List   Diagnosis   • CVA (cerebral vascular accident)     Past Medical History:   Diagnosis Date   • Depression    • Left-sided weakness    • Lupus    • MRSA (methicillin resistant staph aureus) culture positive    • TBI (traumatic brain injury)      Past Surgical History:   Procedure Laterality Date   •  SECTION     • TRACHEOSTOMY            PT ASSESSMENT (last 72 hours)      PT Evaluation       10/05/17 1300 10/05/17 0815    Rehab Evaluation    Document Type evaluation   See MAR  -MS (r) RM (t) MS (c) evaluation  -BN    Subjective Information agree to therapy;complains of;dyspnea;pain  -MS (r) RM (t) MS (c) decreased LOC  -BN    General Information    Patient Profile Review yes  -MS (r) RM (t) MS (c)     Onset of Illness/Injury or Date of Surgery Date 10/04/17  -MS (r) RM (t) MS (c)     Referring Physician Dr. Guillen  -MS (r) RM (t) MS (c)     General Observations fowlers with IV and 2L O2. Representative from facility present  -MS (r) RM (t) MS (c)     Pertinent History Of Current Problem Pt presents with L sided weakness and slurred speech. Past medical history of TBI 20 years ago and residual left sided weakness. X-rays reveal right rib fractures.   -MS (r) RM (t) MS (c)     Precautions/Limitations fall precautions  -MS (r) RM (t) MS (c)     Prior Level of Function max assist:;dependent:;all household mobility;community mobility;ADL's  -MS (r) RM (t) MS (c)     Equipment Currently Used at Home --   DME at assisted living  -MS (r) RM (t) MS (c)     Plans/Goals  Discussed With patient;agreed upon  -MS (r) RM (t) MS (c)     Risks Reviewed patient:;LOB;nausea/vomiting;dizziness;increased discomfort;change in vital signs  -MS (r) RM (t) MS (c)     Benefits Reviewed patient:;improve function;increase independence;increase strength;increase balance;decrease pain  -MS (r) RM (t) MS (c)     Barriers to Rehab medically complex;cognitive status;physical barrier  -MS (r) RM (t) MS (c)     Living Environment    Lives With facility resident  -MS (r) RM (t) MS (c)     Living Arrangements group home  -MS (r) RM (t) MS (c)     Home Accessibility no concerns  -MS (r) RM (t) MS (c)     Stair Railings at Home none  -MS (r) RM (t) MS (c)     Type of Financial/Environmental Concern none  -MS (r) RM (t) MS (c)     Transportation Available other (see comments)   facility transport  -MS (r) RM (t) MS (c)     Clinical Impression    Date of Referral to PT 10/04/17  -MS (r) RM (t) MS (c)     Patient/Family Goals Statement return to living at facility   -MS (r) RM (t) MS (c)     Criteria for Skilled Therapeutic Interventions Met no;no significant expected improvement in functional status  -MS (r) RM (t) MS (c)     Vital Signs    Pre SpO2 (%) 94  -MS (r) RM (t) MS (c)     O2 Delivery Pre Treatment supplemental O2  -MS (r) RM (t) MS (c)     Pain Assessment    Pain Assessment Peterson-Cuevas FACES  -MS (r) RM (t) MS (c) Unable to assess  -BN    Peterson-Cuevas FACES Pain Rating 6  -MS (r) RM (t) MS (c)     Pain Location Head  -MS (r) RM (t) MS (c)     Pain Descriptors Headache  -MS (r) RM (t) MS (c)     Pain Frequency Constant/continuous  -MS (r) RM (t) MS (c)     Pain Intervention(s) Repositioned  -MS (r) RM (t) MS (c)     Response to Interventions unable to assess due to cognitive status  -MS (r) RM (t) MS (c)     Vision Assessment/Intervention    Visual Impairment unable/difficult to assess  -MS (r) RM (t) MS (c) WFL with corrective lenses   Caregiver reports pt does wear glasses  -BN    Cognitive  Assessment/Intervention    Current Cognitive/Communication Assessment impaired  -MS (r) RM (t) MS (c) impaired  -BN    Orientation Status unable/difficult to assess  -MS (r) RM (t) MS (c)     Follows Commands/Answers Questions unable/difficult to assess  -MS (r) RM (t) MS (c) unable/difficult to assess  -BN    Personal Safety unable/difficult to assess  -MS (r) RM (t) MS (c)     Personal Safety Interventions fall prevention program maintained;muscle strengthening facilitated;supervised activity  -MS (r) RM (t) MS (c)     ROM (Range of Motion)    General ROM Detail PROM WNL in bilateral UE and LE  -MS (r) RM (t) MS (c)     MMT (Manual Muscle Testing)    General MMT Assessment Detail 5/5 RUE strength, Some volitional contraction in L hand but no active movement LUE. 1/5 strength grossly in RLE, No active contraction of LLE.   -MS (r) RM (t) MS (c)     Muscle Tone Assessment    Muscle Tone Assessment LUE;LLE  -MS (r) RM (t) MS (c)     LUE Muscle Tone Assessment moderately increased tone  -MS (r) RM (t) MS (c)     LLE Muscle Tone Assessment mildly increased tone   tone at knee, flacid elsewhere.  -MS (r) RM (t) MS (c)     Bed Mobility, Assessment/Treatment    Bed Mobility, Roll Left, Vance dependent (less than 25% patient effort);2 person assist required;verbal cues required   Pt was able to pull with RUE on hand of therapist to assist  -MS (r) RM (t) MS (c)     Bed Mobility, Impairments strength decreased;muscle tone abnormal;coordination impaired  -MS (r) RM (t) MS (c)     Motor Skills/Interventions    Additional Documentation Fine Motor Coordination Training (Group)  -MS (r) RM (t) MS (c)     Fine Motor Coordination Training    Detail (Fine Motor Coordination Training) finger to nose intact on right  -MS (r) RM (t) MS (c)     Sensory Assessment/Intervention    Sensory Impairment --   unable to assess due to cognition  -MS (r) RM (t) MS (c)     Positioning and Restraints    Pre-Treatment Position in bed  -MS  (r) RM (t) MS (c)     Post Treatment Position bed  -MS (r) RM (t) MS (c)     In Bed side lying left;call light within reach;encouraged to call for assist;notified nsg;side rails up x3;SCD pump applied  -MS (r) RM (t) MS (c)       User Key  (r) = Recorded By, (t) = Taken By, (c) = Cosigned By    Initials Name Provider Type    MS Trini Dueñas, PT DPT Physical Therapist    BN Candice Aguirre, CCC-SLP Speech and Language Pathologist     Shashank Quiñones, PT Student PT Student          Physical Therapy Education     Title: PT OT SLP Therapies (Active)     Topic: Physical Therapy (Active)     Point: Mobility training (Active)    Learning Progress Summary    Learner Readiness Method Response Comment Documented by Status   Patient Nonacceptance E NL Pt educated on the role of PT and the need for movement to maintain comfort.  10/05/17 1412 Active                      User Key     Initials Effective Dates Name Provider Type Discipline     08/21/17 -  Shashank Quiñones, PT Student PT Student PT                PT Recommendation and Plan  Anticipated Discharge Disposition: assisted living  PT Frequency: evaluation only  Plan of Care Review  Plan Of Care Reviewed With: patient  Progress: progress toward functional goals as expected  Outcome Summary/Follow up Plan: Pt agreed to and completed physical therapy evaluation. Per employees of assisted living facility pt is dependent with all transfers and uses power w/c for ambulation. Pt was dependent for rolling in bed this session and only had active movement of RUE. Pt is not appropriate for physical therapy at this time. Will defer PROM to OT plan of care. Will reassess if change in status. Anticipated discharge to assisted living facility.               Outcome Measures       10/05/17 1400          How much help from another person do you currently need...    Turning from your back to your side while in flat bed without using bedrails? 1  -MS (r) RM (t) MS (c)      Moving  from lying on back to sitting on the side of a flat bed without bedrails? 1  -MS (r) RM (t) MS (c)      Moving to and from a bed to a chair (including a wheelchair)? 1  -MS (r) RM (t) MS (c)      Standing up from a chair using your arms (e.g., wheelchair, bedside chair)? 1  -MS (r) RM (t) MS (c)      Climbing 3-5 steps with a railing? 1  -MS (r) RM (t) MS (c)      To walk in hospital room? 1  -MS (r) RM (t) MS (c)      -PAC 6 Clicks Score 6  -MS (r) RM (t)      Functional Assessment    Outcome Measure Options -[facility] 6 Clicks Basic Mobility (PT)  -MS (r) RM (t) MS (c)        User Key  (r) = Recorded By, (t) = Taken By, (c) = Cosigned By    Initials Name Provider Type    MS Trini Dueñas, PT DPT Physical Therapist     Shashank Quiñones, PT Student PT Student           Time Calculation:         PT Charges       10/05/17 1409          Time Calculation    Start Time 1311  -MS (r) RM (t) MS (c)      Stop Time 1359  -MS (r) RM (t) MS (c)      Time Calculation (min) 48 min  -MS (r) RM (t)      PT Received On 10/05/17  -MS (r) RM (t) MS (c)        User Key  (r) = Recorded By, (t) = Taken By, (c) = Cosigned By    Initials Name Provider Type    MS Trini Dueñas, PT DPT Physical Therapist     Shashank Quiñones, PT Student PT Student          Therapy Charges for Today     Code Description Service Date Service Provider Modifiers Qty    [id number]  PT EVAL HIGH COMPLEXITY 3 10/5/2017 Shashank Quiñones, PT Student GP 1          PT G-Codes  PT Professional Judgement Used?: Yes  Outcome Measure Options: AM-PAC 6 Clicks Basic Mobility (PT)  Score: 6  Functional Limitation: Mobility: Walking and moving around  Mobility: Walking and Moving Around Current Status (): 100 percent impaired, limited or restricted  Mobility: Walking and Moving Around Goal Status (): 100 percent impaired, limited or restricted  Mobility: Walking and Moving Around Discharge Status (): 100 percent impaired, limited or restricted    PT Discharge  Summary  Anticipated Discharge Disposition: assisted living  Reason for Discharge: At baseline function  Outcomes Achieved: Refer to plan of care for updates on goals achieved  Discharge Destination: Assisted Living Facility    Shashank Quiñones, PT Student  10/5/2017

## 2017-10-05 NOTE — PLAN OF CARE
Problem: Patient Care Overview (Adult)  Goal: Plan of Care Review  Outcome: Ongoing (interventions implemented as appropriate)    10/04/17 2022   Coping/Psychosocial Response Interventions   Plan Of Care Reviewed With patient   Patient Care Overview   Progress no change   Outcome Evaluation   Outcome Summary/Follow up Plan Pt oriented to self only. NPO. Bedrest. Start IV abx. Right elbow wound draining.          Problem: Stroke (Ischemic) (Adult)  Goal: Signs and Symptoms of Listed Potential Problems Will be Absent or Manageable (Stroke)  Outcome: Ongoing (interventions implemented as appropriate)    Problem: Fall Risk (Adult)  Goal: Identify Related Risk Factors and Signs and Symptoms  Outcome: Outcome(s) achieved Date Met:  10/04/17  Goal: Absence of Falls  Outcome: Ongoing (interventions implemented as appropriate)    Problem: Wound, Traumatic, Nonburn (Adult)  Goal: Signs and Symptoms of Listed Potential Problems Will be Absent or Manageable (Wound, Traumatic, Nonburn)  Outcome: Ongoing (interventions implemented as appropriate)

## 2017-10-05 NOTE — THERAPY EVALUATION
Acute Care - Occupational Therapy Initial Evaluation  [facility]     Patient Name: Pallavi Yee  : 1980  MRN: [id number]  Today's Date: 10/5/2017  Onset of Illness/Injury or Date of Surgery Date: 10/04/17  Date of Referral to OT: 10/05/17  Referring Physician: Dr. Guillen    Admit Date: 10/4/2017       ICD-10-CM ICD-9-CM   1. Oropharyngeal dysphagia R13.12 787.22   2. Mobility impaired Z74.09 799.89   3. Impaired mobility and ADLs Z74.09 799.89     Patient Active Problem List   Diagnosis   • CVA (cerebral vascular accident)     Past Medical History:   Diagnosis Date   • Depression    • Left-sided weakness    • Lupus    • MRSA (methicillin resistant staph aureus) culture positive    • TBI (traumatic brain injury)      Past Surgical History:   Procedure Laterality Date   •  SECTION     • TRACHEOSTOMY            OT ASSESSMENT FLOWSHEET (last 72 hours)      OT Evaluation       10/05/17 1318 10/05/17 1300 10/05/17 0815          Rehab Evaluation    Document Type evaluation   see MAR  -MM evaluation   See MAR  -MS (r) RM (t) MS (c) evaluation  -BN      Subjective Information agree to therapy;complains of;pain;dyspnea  -MM agree to therapy;complains of;dyspnea;pain  -MS (r) RM (t) MS (c) decreased LOC  -BN      General Information    Patient Profile Review yes  -MM yes  -MS (r) RM (t) MS (c)       Onset of Illness/Injury or Date of Surgery Date 10/04/17  -MM 10/04/17  -MS (r) RM (t) MS (c)       Referring Physician Dr. Guillen  -MM Dr. Guillen  -MS (r) RM (t) MS (c)       General Observations fowlers with IV, 2L O2, facility representative present for beginning of eval  -MM fowlers with IV and 2L O2. Representative from facility present  -MS (r) RM (t) MS (c)       Pertinent History Of Current Problem Pt presents with L sided weakness and slurred speech. Past medical history of TBI 20 years ago and residual left sided weakness. X-rays reveal right rib fractures.   -MM Pt presents with L sided weakness and slurred  speech. Past medical history of TBI 20 years ago and residual left sided weakness. X-rays reveal right rib fractures.   -MS (r) RM (t) MS (c)       Precautions/Limitations fall precautions  -MM fall precautions  -MS (r) RM (t) MS (c)       Prior Level of Function min assist:;feeding;mod assist:;grooming;max assist:;all household mobility;community mobility;dressing;bathing  -MM max assist:;dependent:;all household mobility;community mobility;ADL's  -MS (r) RM (t) MS (c)       Equipment Currently Used at Home --   DME provided by assisted living  -MM --   DME at assisted living  -MS (r) RM (t) MS (c)       Plans/Goals Discussed With patient;agreed upon  -MM patient;agreed upon  -MS (r) RM (t) MS (c)       Risks Reviewed patient:;LOB;nausea/vomiting;dizziness;increased discomfort;change in vital signs;increased drainage;lines disloged  -MM patient:;LOB;nausea/vomiting;dizziness;increased discomfort;change in vital signs  -MS (r) RM (t) MS (c)       Benefits Reviewed patient:;improve function;increase independence;increase strength;increase balance;decrease pain;decrease risk of DVT;improve skin integrity;increase knowledge  -MM patient:;improve function;increase independence;increase strength;increase balance;decrease pain  -MS (r) RM (t) MS (c)       Barriers to Rehab medically complex;previous functional deficit;cognitive status;physical barrier  -MM medically complex;cognitive status;physical barrier  -MS (r) RM (t) MS (c)       Living Environment    Lives With facility resident  -MM facility resident  -MS (r) RM (t) MS (c)       Living Arrangements group home  -MM group home  -MS (r) RM (t) MS (c)       Home Accessibility no concerns  -MM no concerns  -MS (r) RM (t) MS (c)       Stair Railings at Home  none  -MS (r) RM (t) MS (c)       Type of Financial/Environmental Concern  none  -MS (r) RM (t) MS (c)       Transportation Available  other (see comments)   facility transport  -MS (r) RM (t) MS (c)       Clinical  Impression    Date of Referral to OT 10/05/17  -MM        OT Diagnosis impaired mobility and adls  -MM        Impairments Found (describe specific impairments) ergonomics and body mechanics;gait, locomotion, and balance;arousal, attention, and cognition;joint integrity and mobility;motor function;muscle performance  -MM        Patient/Family Goals Statement return to facility  -MM        Criteria for Skilled Therapeutic Interventions Met yes;treatment indicated  -MM        Rehab Potential good, to achieve stated therapy goals  -MM        Therapy Frequency 3-5 times/wk  -MM        Predicted Duration of Therapy Intervention (days/wks) until d/c  -MM        Anticipated Discharge Disposition assisted living;extended care facility  -MM        Vital Signs    Pre SpO2 (%) 94  -MM 94  -MS (r) RM (t) MS (c)       O2 Delivery Pre Treatment supplemental O2  -MM supplemental O2  -MS (r) RM (t) MS (c)       Pain Assessment    Pain Assessment Peterson-Cuevas FACES  -MM Peterson-Baker FACES  -MS (r) RM (t) MS (c) Unable to assess  -BN      Peterson-Cuevas FACES Pain Rating 6  -MM 6  -MS (r) RM (t) MS (c)       Pain Location Head  -MM Head  -MS (r) RM (t) MS (c)       Pain Descriptors Headache  -MM Headache  -MS (r) RM (t) MS (c)       Pain Frequency Constant/continuous  -MM Constant/continuous  -MS (r) RM (t) MS (c)       Pain Intervention(s) Repositioned  -MM Repositioned  -MS (r) RM (t) MS (c)       Response to Interventions not apparent further distress  -MM unable to assess due to cognitive status  -MS (r) RM (t) MS (c)       Vision Assessment/Intervention    Visual Impairment unable/difficult to assess  -MM unable/difficult to assess  -MS (r) RM (t) MS (c) WFL with corrective lenses   Caregiver reports pt does wear glasses  -BN      Cognitive Assessment/Intervention    Current Cognitive/Communication Assessment impaired  -MM impaired  -MS (r) RM (t) MS (c) impaired  -BN      Orientation Status unable/difficult to assess  -MM  unable/difficult to assess  -MS (r) RM (t) MS (c)       Follows Commands/Answers Questions unable/difficult to assess  -MM unable/difficult to assess  -MS (r) RM (t) MS (c) unable/difficult to assess  -BN      Personal Safety unable/difficult to assess  -MM unable/difficult to assess  -MS (r) RM (t) MS (c)       Personal Safety Interventions fall prevention program maintained;muscle strengthening facilitated;supervised activity  -MM fall prevention program maintained;muscle strengthening facilitated;supervised activity  -MS (r) RM (t) MS (c)       ROM (Range of Motion)    General ROM Detail PROM WNL in L UE and BLE. RUE AROM WFL  -MM PROM WNL in bilateral UE and LE  -MS (r) RM (t) MS (c)       MMT (Manual Muscle Testing)    General MMT Assessment Detail 5/5 RUE, no active movement to LUE, minor contraction to L hand  -MM 5/5 RUE strength, Some volitional contraction in L hand but no active movement LUE. 1/5 strength grossly in RLE, No active contraction of LLE.   -MS (r) RM (t) MS (c)       Muscle Tone Assessment    Muscle Tone Assessment LUE;LLE  -MM LUE;LLE  -MS (r) RM (t) MS (c)       LUE Muscle Tone Assessment moderately increased tone   R hand:flaccid, R wrist/elbow: mod flexor tone, Rshould Flac  -MM moderately increased tone  -MS (r) RM (t) MS (c)       LLE Muscle Tone Assessment mildly increased tone  -MM mildly increased tone   tone at knee, flacid elsewhere.  -MS (r) RM (t) MS (c)       Bed Mobility, Assessment/Treatment    Bed Mobility, Roll Left, [place] dependent (less than 25% patient effort);2 person assist required;verbal cues required   assisted with R hand only  -MM dependent (less than 25% patient effort);2 person assist required;verbal cues required   Pt was able to pull with RUE on hand of therapist to assist  -MS (r) RM (t) MS (c)       Bed Mobility, Impairments strength decreased;muscle tone abnormal;coordination impaired  -MM strength decreased;muscle tone abnormal;coordination impaired   -MS (r) RM (t) MS (c)       Lower Body Dressing Assessment/Training    LB Dressing Assess/Train, Comment expected level of assist Dependent  -MM        Motor Skills/Interventions    Additional Documentation Fine Motor Coordination Training (Group);Gross Motor Coordination Training (Group)  -MM Fine Motor Coordination Training (Group)  -MS (r) RM (t) MS (c)       Gross Motor Coordination Training    Gross Motor Skill, Impairments Detail Finger to nose R hand: intact  -MM        Fine Motor Coordination Training    Detail (Fine Motor Coordination Training)  finger to nose intact on right  -MS (r) RM (t) MS (c)       Opposition Right:;intact;Left:;impaired  -MM        Sensory Assessment/Intervention    Sensory Impairment --   unable to assess due to cognition  -MM --   unable to assess due to cognition  -MS (r) RM (t) MS (c)       General Therapy Interventions    Planned Therapy Interventions activity intolerance;adaptive equipment training;ADL retraining;balance training;bed mobility training;energy conservation;fine motor coordination training;home exercise program;motor coordination training;neuromuscular re-education;strengthening;stretching;ROM (Range of Motion)  -MM        Positioning and Restraints    Pre-Treatment Position in bed  -MM in bed  -MS (r) RM (t) MS (c)       Post Treatment Position bed  -MM bed  -MS (r) RM (t) MS (c)       In Bed side lying left;call light within reach;encouraged to call for assist;notified nsg;side rails up x3;SCD pump applied  -MM side lying left;call light within reach;encouraged to call for assist;notified nsg;side rails up x3;SCD pump applied  -MS (r) RM (t) MS (c)         User Key  (r) = Recorded By, (t) = Taken By, (c) = Cosigned By    Initials Name Effective Dates    MS FloresTrini KIARRA Dueñas, PT DPT 08/02/16 -     BN Candice Aguirre, CCC-SLP 08/02/16 -     MM Sean Ambriz, OTR/L 10/21/16 -     RM Shashank Quiñones, PT Student 08/21/17 -            Occupational Therapy  Education     Title: PT OT SLP Therapies (Active)     Topic: Occupational Therapy (Active)     Point: ADL training (Active)    Description: Instruct learner(s) on proper safety adaptation and remediation techniques during self care or transfers.   Instruct in proper use of assistive devices.    Learning Progress Summary    Learner Readiness Method Response Comment Documented by Status   Patient Acceptance E NR pt and facility staff educated on OT role, benefits.  10/05/17 1613 Active   Other Acceptance E NR pt and facility staff educated on OT role, benefits.  10/05/17 1613 Active                      User Key     Initials Effective Dates Name Provider Type Discipline     10/21/16 -  Sean Ambriz OTR/L Occupational Therapist OT                  OT Recommendation and Plan  Anticipated Discharge Disposition: assisted living, extended care facility  Planned Therapy Interventions: activity intolerance, adaptive equipment training, ADL retraining, balance training, bed mobility training, energy conservation, fine motor coordination training, home exercise program, motor coordination training, neuromuscular re-education, strengthening, stretching, ROM (Range of Motion)  Therapy Frequency: 3-5 times/wk  Plan of Care Review  Plan Of Care Reviewed With: patient  Progress: progress toward functional goals as expected  Outcome Summary/Follow up Plan: OT eval complete. Staff from [facility] assisted in living environment and history. Pt was dependent for rolling, only assisting with R hand. Pt was AROM in RUE, PROM in LUE. Skilled OT recommended in adls, bed mobility, ROM, and positioning. Recommended d/c [facility].          OT Goals       10/05/17 1615          Bed Mobility OT LTG    Bed Mobility OT LTG, Date Established 10/05/17  -MM      Bed Mobility OT LTG, Time to Achieve by discharge  -MM      Bed Mobility OT LTG, Activity Type all bed mobility  -MM      Bed Mobility OT LTG, [place] Level maximum assist (25%  patient effort)  -MM      Bed Mobility OT LTG, Assist Device bed rails  -MM      Range of Motion OT LTG    Range of Motion Goal OT LTG, Date Established 10/05/17  -MM      Range of Motion Goal OT LTG, Time to Achieve by discharge  -MM      Range of Motion Goal OT LTG, AROM Measure Pt will progress with ROM to L UE/LE to decrease tone and prevent contracture.  -MM      Eating Self-Feeding OT LTG    Eat Self Feeding Goal OT LTG, Date Established 10/05/17  -MM      Eat Self Feeding Goal OT LTG, Time to Achieve by discharge  -MM      Eat Self Feed Goal OT LTG, [place] Level supervision required;set up required  -MM      Eat Self Feeding Goal OT LTG, Adaptive Equip utensils, large   -MM      Eat Self Feeding Goal OT LTG, Position reclined, sitting in bed  -MM      Grooming OT LTG    Grooming Goal OT LTG, Date Established 10/05/17  -MM      Grooming Goal OT LTG, Time to Achieve by discharge  -MM      Grooming Goal OT LTG, [place] Level moderate assist (50% patient effort)  -MM      Grooming Goal OT LTG, Position reclined, sitting in bed  -MM        User Key  (r) = Recorded By, (t) = Taken By, (c) = Cosigned By    Initials Name Provider Type    AUBREY Ambriz, OTR/L Occupational Therapist                Outcome Measures       10/05/17 1600 10/05/17 1400       How much help from another person do you currently need...    Turning from your back to your side while in flat bed without using bedrails?  1  -MS (r) RM (t) MS (c)     Moving from lying on back to sitting on the side of a flat bed without bedrails?  1  -MS (r) RM (t) MS (c)     Moving to and from a bed to a chair (including a wheelchair)?  1  -MS (r) RM (t) MS (c)     Standing up from a chair using your arms (e.g., wheelchair, bedside chair)?  1  -MS (r) RM (t) MS (c)     Climbing 3-5 steps with a railing?  1  -MS (r) RM (t) MS (c)     To walk in hospital room?  1  -MS (r) RM (t) MS (c)     AM-PAC 6 Clicks Score  6  -MS (r) RM (t)     How much  help from another is currently needed...    Putting on and taking off regular lower body clothing? 1  -MM      Bathing (including washing, rinsing, and drying) 1  -MM      Toileting (which includes using toilet bed pan or urinal) 1  -MM      Putting on and taking off regular upper body clothing 1  -MM      Taking care of personal grooming (such as brushing teeth) 1  -MM      Eating meals 1  -MM      Score 6  -MM      Functional Assessment    Outcome Measure Options AM-PAC 6 Clicks Daily Activity (OT)  -MM AM-PAC 6 Clicks Basic Mobility (PT)  -MS (r) RM (t) MS (c)       User Key  (r) = Recorded By, (t) = Taken By, (c) = Cosigned By    Initials Name Provider Type    MS Trini Dueñas, PT DPT Physical Therapist    MM NAHOMI Snow/L Occupational Therapist    RM Shashank Quiñones, PT Student PT Student          Time Calculation:   OT Start Time: 1318  OT Stop Time: 1359  OT Time Calculation (min): 41 min    Therapy Charges for Today     Code Description Service Date Service Provider Modifiers Qty    [id number]  OT SELFCARE CURRENT 10/5/2017 NAHOMI Snow/L GO, CN 1    [id number]  OT SELFCARE PROJECTED 10/5/2017 NAHOMI Snow/L GO, CM 1    [id number]  OT EVAL HIGH COMPLEXITY 3 10/5/2017 NAHOMI Snow/L GO, KX 1          OT G-codes  OT Professional Judgement Used?: Yes  OT Functional Scales Options: AM-PAC 6 Clicks Daily Activity (OT)  Score: 6  Functional Limitation: Self care  Self Care Current Status (): 100 percent impaired, limited or restricted  Self Care Goal Status (): At least 80 percent but less than 100 percent impaired, limited or restricted    NAHOMI Arroyo/YEN  10/5/2017

## 2017-10-05 NOTE — PLAN OF CARE
Problem: Patient Care Overview (Adult)  Goal: Plan of Care Review  Outcome: Ongoing (interventions implemented as appropriate)    10/05/17 1615   Coping/Psychosocial Response Interventions   Plan Of Care Reviewed With patient   Patient Care Overview   Progress progress toward functional goals as expected   Outcome Evaluation   Outcome Summary/Follow up Plan OT eval complete. Staff from [facility] assisted in living environment and history. Pt was dependent for rolling, only assisting with R hand. Pt was AROM in RUE, PROM in LUE. Skilled OT recommended in adls, bed mobility, ROM, and positioning. Recommended d/c [facility].         Problem: Inpatient Occupational Therapy  Goal: Bed Mobility Goal LTG- OT  Outcome: Ongoing (interventions implemented as appropriate)    10/05/17 1615   Bed Mobility OT LTG   Bed Mobility OT LTG, Date Established 10/05/17   Bed Mobility OT LTG, Time to Achieve by discharge   Bed Mobility OT LTG, Activity Type all bed mobility   Bed Mobility OT LTG, [place] Level maximum assist (25% patient effort)   Bed Mobility OT LTG, Assist Device bed rails       Goal: Range of Motion Goal LTG- OT  Outcome: Ongoing (interventions implemented as appropriate)    10/05/17 1615   Range of Motion OT LTG   Range of Motion Goal OT LTG, Date Established 10/05/17   Range of Motion Goal OT LTG, Time to Achieve by discharge   Range of Motion Goal OT LTG, AROM Measure Pt will progress with ROM to L UE/LE to decrease tone and prevent contracture.       Goal: Eating Self-Feeding Goal LTG- OT  Outcome: Ongoing (interventions implemented as appropriate)    10/05/17 1615   Eating Self-Feeding OT LTG   Eat Self Feeding Goal OT LTG, Date Established 10/05/17   Eat Self Feeding Goal OT LTG, Time to Achieve by discharge   Eat Self Feed Goal OT LTG, [place] Level supervision required;set up required   Eat Self Feeding Goal OT LTG, Adaptive Equip utensils, large    Eat Self Feeding Goal OT LTG, Position reclined, sitting  in bed       Goal: Grooming Goal LTG- OT  Outcome: Ongoing (interventions implemented as appropriate)    10/05/17 1615   Grooming OT LTG   Grooming Goal OT LTG, Date Established 10/05/17   Grooming Goal OT LTG, Time to Achieve by discharge   Grooming Goal OT LTG, [place] Level moderate assist (50% patient effort)   Grooming Goal OT LTG, Position reclined, sitting in bed

## 2017-10-06 ENCOUNTER — APPOINTMENT (OUTPATIENT)
Dept: CT IMAGING | Facility: HOSPITAL | Age: 37
End: 2017-10-06

## 2017-10-06 LAB
ANION GAP SERPL CALCULATED.3IONS-SCNC: 8 MMOL/L (ref 4–13)
BACTERIA SPEC RESP CULT: ABNORMAL
BASOPHILS # BLD AUTO: 0.02 10*3/MM3 (ref 0–0.2)
BASOPHILS NFR BLD AUTO: 0.2 % (ref 0–2)
BUN BLD-MCNC: 17 MG/DL (ref 5–21)
BUN/CREAT SERPL: 34 (ref 7–25)
CALCIUM SPEC-SCNC: 8.4 MG/DL (ref 8.4–10.4)
CHLORIDE SERPL-SCNC: 107 MMOL/L (ref 98–110)
CO2 SERPL-SCNC: 26 MMOL/L (ref 24–31)
CREAT BLD-MCNC: 0.5 MG/DL (ref 0.5–1.4)
DEPRECATED RDW RBC AUTO: 51.3 FL (ref 40–54)
EOSINOPHIL # BLD AUTO: 0 10*3/MM3 (ref 0–0.7)
EOSINOPHIL NFR BLD AUTO: 0 % (ref 0–4)
ERYTHROCYTE [DISTWIDTH] IN BLOOD BY AUTOMATED COUNT: 14.7 % (ref 12–15)
GFR SERPL CREATININE-BSD FRML MDRD: 138 ML/MIN/1.73
GLUCOSE BLD-MCNC: 96 MG/DL (ref 70–100)
GRAM STN SPEC: ABNORMAL
HCT VFR BLD AUTO: 35.6 % (ref 37–47)
HGB BLD-MCNC: 11.6 G/DL (ref 12–16)
IMM GRANULOCYTES # BLD: 0.03 10*3/MM3 (ref 0–0.03)
IMM GRANULOCYTES NFR BLD: 0.4 % (ref 0–5)
LYMPHOCYTES # BLD AUTO: 1.45 10*3/MM3 (ref 0.72–4.86)
LYMPHOCYTES NFR BLD AUTO: 17.6 % (ref 15–45)
MCH RBC QN AUTO: 31.3 PG (ref 28–32)
MCHC RBC AUTO-ENTMCNC: 32.6 G/DL (ref 33–36)
MCV RBC AUTO: 96 FL (ref 82–98)
MONOCYTES # BLD AUTO: 0.75 10*3/MM3 (ref 0.19–1.3)
MONOCYTES NFR BLD AUTO: 9.1 % (ref 4–12)
NEUTROPHILS # BLD AUTO: 6.01 10*3/MM3 (ref 1.87–8.4)
NEUTROPHILS NFR BLD AUTO: 72.7 % (ref 39–78)
PLATELET # BLD AUTO: 165 10*3/MM3 (ref 130–400)
PMV BLD AUTO: 11.5 FL (ref 6–12)
POTASSIUM BLD-SCNC: 3.6 MMOL/L (ref 3.5–5.3)
RBC # BLD AUTO: 3.71 10*6/MM3 (ref 4.2–5.4)
SODIUM BLD-SCNC: 141 MMOL/L (ref 135–145)
WBC NRBC COR # BLD: 8.26 10*3/MM3 (ref 4.8–10.8)

## 2017-10-06 PROCEDURE — 25010000002 ENOXAPARIN PER 10 MG: Performed by: FAMILY MEDICINE

## 2017-10-06 PROCEDURE — 92526 ORAL FUNCTION THERAPY: CPT

## 2017-10-06 PROCEDURE — 85025 COMPLETE CBC W/AUTO DIFF WBC: CPT | Performed by: FAMILY MEDICINE

## 2017-10-06 PROCEDURE — 25010000002 FUROSEMIDE PER 20 MG: Performed by: FAMILY MEDICINE

## 2017-10-06 PROCEDURE — 99233 SBSQ HOSP IP/OBS HIGH 50: CPT | Performed by: PSYCHIATRY & NEUROLOGY

## 2017-10-06 PROCEDURE — 80048 BASIC METABOLIC PNL TOTAL CA: CPT | Performed by: FAMILY MEDICINE

## 2017-10-06 PROCEDURE — 70498 CT ANGIOGRAPHY NECK: CPT

## 2017-10-06 RX ORDER — NICOTINE 21 MG/24HR
1 PATCH, TRANSDERMAL 24 HOURS TRANSDERMAL EVERY 24 HOURS
Status: DISCONTINUED | OUTPATIENT
Start: 2017-10-06 | End: 2017-10-12

## 2017-10-06 RX ORDER — SCOLOPAMINE TRANSDERMAL SYSTEM 1 MG/1
1 PATCH, EXTENDED RELEASE TRANSDERMAL
Status: DISCONTINUED | OUTPATIENT
Start: 2017-10-06 | End: 2017-10-07

## 2017-10-06 RX ORDER — ATORVASTATIN CALCIUM 10 MG/1
20 TABLET, FILM COATED ORAL NIGHTLY
Status: DISCONTINUED | OUTPATIENT
Start: 2017-10-06 | End: 2017-10-06

## 2017-10-06 RX ADMIN — FUROSEMIDE 20 MG: 10 INJECTION, SOLUTION INTRAMUSCULAR; INTRAVENOUS at 09:30

## 2017-10-06 RX ADMIN — DEXTROSE MONOHYDRATE, SODIUM CHLORIDE, AND POTASSIUM CHLORIDE 100 ML/HR: 50; 4.5; 1.49 INJECTION, SOLUTION INTRAVENOUS at 15:05

## 2017-10-06 RX ADMIN — ENOXAPARIN SODIUM 40 MG: 40 INJECTION SUBCUTANEOUS at 09:30

## 2017-10-06 RX ADMIN — CLINDAMYCIN PHOSPHATE 300 MG: 6 INJECTION, SOLUTION INTRAVENOUS at 17:34

## 2017-10-06 RX ADMIN — SCOPALAMINE 1 PATCH: 1 PATCH, EXTENDED RELEASE TRANSDERMAL at 19:25

## 2017-10-06 RX ADMIN — ASPIRIN 300 MG: 300 SUPPOSITORY RECTAL at 09:31

## 2017-10-06 RX ADMIN — NICOTINE 1 PATCH: 21 PATCH, EXTENDED RELEASE TRANSDERMAL at 23:03

## 2017-10-06 RX ADMIN — CLINDAMYCIN PHOSPHATE 300 MG: 6 INJECTION, SOLUTION INTRAVENOUS at 09:30

## 2017-10-06 RX ADMIN — CLINDAMYCIN PHOSPHATE 300 MG: 6 INJECTION, SOLUTION INTRAVENOUS at 03:12
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
## 2017-10-06 NOTE — PROGRESS NOTES
Discharge Planning Assessment  [facility]     Patient Name: Pallavi Yee  MRN: [id number]  Today's Date: 10/6/2017    Admit Date: 10/4/2017          Discharge Needs Assessment       10/06/17 1556    Living Environment    Lives With facility resident    Living Arrangements group home    Home Accessibility no concerns    Transportation Available family or friend will provide;car;ambulance    Living Environment    Provides Primary Care For no one    Primary Care Provided By parent(s);child(pedro) (specify);other (see comments)   Staff at [facility]    Quality Of Family Relationships supportive;helpful;involved    Able to Return to Prior Living Arrangements yes    Discharge Needs Assessment    Concerns To Be Addressed discharge planning concerns;adjustment to diagnosis/illness concerns    Readmission Within The Last 30 Days no previous admission in last 30 days    Outpatient/Agency/Support Group Needs group home (specify)    Anticipated Changes Related to Illness inability to care for self    Equipment Currently Used at Home wheelchair;walker, rolling;commode;shower chair;raised toilet    Discharge Planning Comments Patient is from a group home and has needed DME there. Per patient's family, current discharge plan is for patient to return to group home. SS to follow.             Discharge Plan     None        Discharge Placement     No information found                Demographic Summary     None            Functional Status     None            Psychosocial     None            Abuse/Neglect     None            Legal     None            Substance Abuse     None            Patient Forms     None          HUI Hayes

## 2017-10-06 NOTE — PLAN OF CARE
Problem: Patient Care Overview (Adult)  Goal: Plan of Care Review  Outcome: Ongoing (interventions implemented as appropriate)    10/06/17 8984   Coping/Psychosocial Response Interventions   Plan Of Care Reviewed With patient;family   Patient Care Overview   Progress no change   Outcome Evaluation   Outcome Summary/Follow up Plan LS coarase, congested. Turn q 2 hours. No neuro change, speech is garbled/slurred, very difficult to understand. Patient is very annoyed, uncooperative.       Goal: Adult Individualization and Mutuality  Outcome: Ongoing (interventions implemented as appropriate)  Goal: Discharge Needs Assessment  Outcome: Ongoing (interventions implemented as appropriate)    Problem: Stroke (Ischemic) (Adult)  Goal: Signs and Symptoms of Listed Potential Problems Will be Absent or Manageable (Stroke)  Outcome: Ongoing (interventions implemented as appropriate)    Problem: Fall Risk (Adult)  Goal: Absence of Falls  Outcome: Ongoing (interventions implemented as appropriate)    Problem: Wound, Traumatic, Nonburn (Adult)  Goal: Signs and Symptoms of Listed Potential Problems Will be Absent or Manageable (Wound, Traumatic, Nonburn)  Outcome: Ongoing (interventions implemented as appropriate)    Problem: Pressure Ulcer Risk (Chuy Scale) (Adult,Obstetrics,Pediatric)  Goal: Skin Integrity  Outcome: Ongoing (interventions implemented as appropriate)

## 2017-10-06 NOTE — THERAPY TREATMENT NOTE
Acute Care - Speech Language Pathology   Swallow Treatment Note [facility]     Patient Name: Pallavi Yee  : 1980  MRN: [id number]  Today's Date: 10/6/2017  Onset of Illness/Injury or Date of Surgery Date: 10/04/17     Referring Physician: Emelina      Admit Date: 10/4/2017   Swallowing tx completed this AM. Upon ST arrival, pt was noted to have congested breath sounds and vocal quality with poor awareness of need to attempt to clear via spontaneous throat clear or cough. Pt also unable to follow commands to attempt to clear despite max verbal cues and modeling. Unable to follow oral motor commands, with significant difficulty attending to tasks that SLP was attempting to conduct, with excessive, essentially unintelligible verbalizations. Pt did accept a 1x ice chip trial, with persistent verbalization attempts with the bolus positioned anteriorly in the oral cavity. Minimal to no bolus manipulation, with inconsistent, weak swallows with open mouth posture. Increased wet vocal quality, weak throat clearing, and coughing noted. No further PO trials presented secondary to the high risk for aspiration. Unable to follow other attempted commands to participate in tx exercises. Educated RNAdelina, that  recommends BELKIS. Feel pt would be at high risk for pulling -Mervat tube, even if consent was obtained for wrist restraints. MD to determine if pt is appropriate for TPN. ST to continue to follow and tx. Thanks!   Fatuma Leger, CCC-SLP 10/6/2017 11:40 AM  Visit Dx:      ICD-10-CM ICD-9-CM   1. Oropharyngeal dysphagia R13.12 787.22   2. Mobility impaired Z74.09 799.89   3. Impaired mobility and ADLs Z74.09 799.89     Patient Active Problem List   Diagnosis   • CVA (cerebral vascular accident)             Adult Rehabilitation Note       10/06/17 1103 10/05/17 0815       Rehab Assessment/Intervention    Discipline speech language pathologist  -TM      Document Type therapy note (daily note)  -TM      Subjective  Information --   Alert, cooperative for 1x ice chip  -TM      Patient Effort, Rehab Treatment poor  -TM      Recorded by [TM] YVETTE Traore      Swallow Assessment/Intervention    Additional Documentation  Dysphagia/Swallow Intervention (Group)  -BN     Recorded by  [BN] YVETTE Drake     Dysphagia/Swallow Intervention    Dysphagia/Swallow Therapeutic Feed Upon ST arrival, pt was noted to have congested breath sounds and vocal quality with poor awareness of need to attempt to clear via spontaneous throat clear or cough. Pt also unable to follow commands to attempt to clear despite max verbal cues and modeling. Unable to follow oral motor commands, with significant difficulty attending to tasks that SLP was attempting to conduct, with excessive, essentially unintelligible verbalizations. Pt did accept a 1x ice chip trial, with persistent verbalization attempts with the bolus positioned anteriorly in the oral cavity. Minimal to no bolus manipulation, with inconsistent, weak swallows with open mouth posture. Increased wet vocal quality, weak throat clearing, and coughing noted. No further PO trials presented secondary to the high risk for aspiration. Unable to follow other attempted commands to participate in tx exercises.   -TM Pt will tolerate PO trials with SLP without s/s of aspiration.   -BN     Recorded by [TM] YVETTE Traore [BN] YVETTE Drake       User Key  (r) = Recorded By, (t) = Taken By, (c) = Cosigned By    Initials Name Effective Dates    TM YVETTE Traore 08/02/16 -     BN YVETTE Drake 08/02/16 -                   IP SLP Goals       10/06/17 1135 10/05/17 0910       Begin to Take Some PO Safely    Begin to Take Some PO Safely- SLP, Date Established  10/05/17  -BN     Begin to Take Some PO Safely- SLP, Time to Achieve  by discharge  -BN     Begin to Take Some PO Safely- SLP, Activity Level  Patient will improve oral skills  for more efficient eating;Patient will improve timing of pharyngeal response for safer, more efficient swallow  -BN     Begin to Take Some PO Safely- SLP, Additional Goal  Pt will tolerate PO trials with SLP without s/s of aspiration  -BN     Begin to Take Some PO Safely- SLP, Date Goal Reviewed 10/06/17  -TM 10/05/17  -BN     Begin to Take Some PO Safely- SLP, Outcome  goal ongoing  -BN       User Key  (r) = Recorded By, (t) = Taken By, (c) = Cosigned By    Initials Name Provider Type     Fatuma Leger, CCC-SLP Speech and Language Pathologist     Candice Aguirre, CCC-SLP Speech and Language Pathologist          EDUCATION  The patient has been educated in the following areas:   Dysphagia (Swallowing Impairment).    SLP Recommendation and Plan                                           Plan of Care Review  Plan Of Care Reviewed With: other (see comments) (Adelina WOO. )  Progress: no change  Outcome Summary/Follow up Plan: Swallowing tx completed this AM. Upon ST arrival, pt was noted to have congested breath sounds and vocal quality with poor awareness of need to attempt to clear via spontaneous throat clear or cough. Pt also unable to follow commands to attempt to clear despite max verbal cues and modeling. Unable to follow oral motor commands, with significant difficulty attending to tasks that SLP was attempting to conduct, with excessive, essentially unintelligible verbalizations. Pt did accept a 1x ice chip trial, with persistent verbalization attempts with the bolus positioned anteriorly in the oral cavity. Minimal to no bolus manipulation, with inconsistent, weak swallows with open mouth posture. Increased wet vocal quality, weak throat clearing, and coughing noted. No further PO trials presented secondary to the high risk for aspiration. Unable to follow other attempted commands to participate in tx exercises. Educated Adelina WOO, that  recommends BELKIS. Feel pt would be at high risk for pulling  -Mervat tube, even if consent was obtained for wrist restraints. MD to determine if pt is appropriate for TPN. ST to continue to follow and tx. Thanks!        SLP Outcome Measures (last 72 hours)      SLP Outcome Measures       10/05/17 0900          SLP Outcome Measures    Outcome Measure Used? Adult NOMS  -BN      FCM Scores    FCM Chosen Swallowing  -BN      Swallowing FCM Score 2  -BN        User Key  (r) = Recorded By, (t) = Taken By, (c) = Cosigned By    Initials Name Effective Dates     YVETTE Drake 16 -              Time Calculation:         Time Calculation- SLP       10/06/17 1138          Time Calculation- SLP    SLP Start Time 1103  -TM      SLP Stop Time 1126  -TM      SLP Time Calculation (min) 23 min  -TM      SLP Received On 10/06/17  -TM        User Key  (r) = Recorded By, (t) = Taken By, (c) = Cosigned By    Initials Name Provider Type    TM YVETTE Traore Speech and Language Pathologist          Therapy Charges for Today     Code Description Service Date Service Provider Modifiers Qty    [id number]  ST TREATMENT SWALLOW 2 10/6/2017 TINA TraoreSLP GN, KX 1          SLP G-Codes  SLP NOMS Used?: Yes  Functional Limitations: Swallowing  Swallow Current Status (): At least 80 percent but less than 100 percent impaired, limited or restricted  Swallow Goal Status (): At least 60 percent but less than 80 percent impaired, limited or restricted      YVETTE Hart  10/6/2017

## 2017-10-06 NOTE — PLAN OF CARE
Problem: Patient Care Overview (Adult)  Goal: Plan of Care Review  Outcome: Ongoing (interventions implemented as appropriate)    10/06/17 0800   Coping/Psychosocial Response Interventions   Plan Of Care Reviewed With patient   Patient Care Overview   Progress no change   Outcome Evaluation   Outcome Summary/Follow up Plan Pt. uncooperative through the night and very hard to understand due to garbled speech, difficulty following commands, Walsh in place, suctioned PRN when pt. would allow, refuses telemetry and SCD, lung sounds coarse.          Problem: Stroke (Ischemic) (Adult)  Goal: Signs and Symptoms of Listed Potential Problems Will be Absent or Manageable (Stroke)  Outcome: Ongoing (interventions implemented as appropriate)    Problem: Fall Risk (Adult)  Goal: Absence of Falls  Outcome: Ongoing (interventions implemented as appropriate)    Problem: Wound, Traumatic, Nonburn (Adult)  Goal: Signs and Symptoms of Listed Potential Problems Will be Absent or Manageable (Wound, Traumatic, Nonburn)  Outcome: Ongoing (interventions implemented as appropriate)    Problem: Pressure Ulcer Risk (Chuy Scale) (Adult,Obstetrics,Pediatric)  Goal: Skin Integrity  Outcome: Ongoing (interventions implemented as appropriate)

## 2017-10-06 NOTE — PLAN OF CARE
Problem: Patient Care Overview (Adult)  Goal: Plan of Care Review  Outcome: Ongoing (interventions implemented as appropriate)    10/05/17 2023   Coping/Psychosocial Response Interventions   Plan Of Care Reviewed With patient   Patient Care Overview   Progress improving   Outcome Evaluation   Outcome Summary/Follow up Plan Pt speech garbled but somewhat clearer this afternoon. Bedrest. NPO. BRAUN. Left arm contracted. FC in place         Problem: Stroke (Ischemic) (Adult)  Goal: Signs and Symptoms of Listed Potential Problems Will be Absent or Manageable (Stroke)  Outcome: Ongoing (interventions implemented as appropriate)    Problem: Fall Risk (Adult)  Goal: Absence of Falls  Outcome: Ongoing (interventions implemented as appropriate)    Problem: Wound, Traumatic, Nonburn (Adult)  Goal: Signs and Symptoms of Listed Potential Problems Will be Absent or Manageable (Wound, Traumatic, Nonburn)  Outcome: Ongoing (interventions implemented as appropriate)    Problem: Pressure Ulcer Risk (Chuy Scale) (Adult,Obstetrics,Pediatric)  Goal: Identify Related Risk Factors and Signs and Symptoms  Outcome: Outcome(s) achieved Date Met:  10/05/17  Goal: Skin Integrity  Outcome: Ongoing (interventions implemented as appropriate)

## 2017-10-06 NOTE — PLAN OF CARE
Problem: Patient Care Overview (Adult)  Goal: Plan of Care Review  Outcome: Ongoing (interventions implemented as appropriate)    10/06/17 1135   Coping/Psychosocial Response Interventions   Plan Of Care Reviewed With other (see comments)  (Adelina WOO. )   Patient Care Overview   Progress no change   Outcome Evaluation   Outcome Summary/Follow up Plan Swallowing tx completed this AM. Upon ST arrival, pt was noted to have congested breath sounds and vocal quality with poor awareness of need to attempt to clear via spontaneous throat clear or cough. Pt also unable to follow commands to attempt to clear despite max verbal cues and modeling. Unable to follow oral motor commands, with significant difficulty attending to tasks that SLP was attempting to conduct, with excessive, essentially unintelligible verbalizations. Pt did accept a 1x ice chip trial, with persistent verbalization attempts with the bolus positioned anteriorly in the oral cavity. Minimal to no bolus manipulation, with inconsistent, weak swallows with open mouth posture. Increased wet vocal quality, weak throat clearing, and coughing noted. No further PO trials presented secondary to the high risk for aspiration. Unable to follow other attempted commands to participate in tx exercises. Educated Adelina WOO, that ST recommends BELKIS. Feel pt would be at high risk for pulling -Mervat tube, even if consent was obtained for wrist restraints. MD to determine if pt is appropriate for TPN. ST to continue to follow and tx. Thanks!          Problem: Inpatient SLP  Goal: Dysphagia- Patient will improve swallowing skills to begin to take some PO safely  Outcome: Ongoing (interventions implemented as appropriate)    10/05/17 0910 10/06/17 1135   Begin to Take Some PO Safely   Begin to Take Some PO Safely- SLP, Date Established 10/05/17 --    Begin to Take Some PO Safely- SLP, Time to Achieve by discharge --    Begin to Take Some PO Safely- SLP, Activity Level Patient  will improve oral skills for more efficient eating;Patient will improve timing of pharyngeal response for safer, more efficient swallow --    Begin to Take Some PO Safely- SLP, Additional Goal Pt will tolerate PO trials with SLP without s/s of aspiration --    Begin to Take Some PO Safely- SLP, Date Goal Reviewed --  10/06/17   Begin to Take Some PO Safely- SLP, Outcome goal ongoing --

## 2017-10-06 NOTE — PROGRESS NOTES
[facility] Medicine Services  INPATIENT PROGRESS NOTE    Length of Stay: 2  Date of Admission: 10/4/2017  Primary Care Physician: ROBERTH Beckham    Subjective   Chief Complaint: AMS, slurred speech, secretions  HPI   Patient unable communicate still with word salad significant secretions unable to maintain any count of communication.  No family here.      Review of Systems     We will to obtain due to her current mental status    Objective    Temp:  [98.1 °F (36.7 °C)-99.4 °F (37.4 °C)] 99.4 °F (37.4 °C)  Heart Rate:  [69-88] 86  Resp:  [20-22] 20  BP: (136-140)/(69-96) 136/96  Physical Exam   Constitutional:   Awake, unable to communicate, garbled speech, congested, secretions   HENT:   Head: Normocephalic and atraumatic.   Eyes: Conjunctivae and EOM are normal. Pupils are equal, round, and reactive to light.   Neck: Neck supple. No JVD present. No thyromegaly present.   Cardiovascular: Normal rate, regular rhythm, normal heart sounds and intact distal pulses.  Exam reveals no gallop and no friction rub.    No murmur heard.  Pulmonary/Chest: Effort normal and breath sounds normal. No respiratory distress. She has no wheezes. She has no rales. She exhibits no tenderness.   Abdominal: Soft. Bowel sounds are normal. She exhibits no distension. There is no tenderness. There is no rebound and no guarding.   Genitourinary:   Genitourinary Comments: Walsh in place   Musculoskeletal: Normal range of motion. She exhibits no edema, tenderness or deformity.   Lymphadenopathy:     She has no cervical adenopathy.   Neurological: She is alert. She displays normal reflexes. No cranial nerve deficit. She exhibits normal muscle tone.   Moving right arm, not answering questions or following commands, contractures of left extremities   Skin: Skin is warm and dry. No rash noted.           Results Review:  I have reviewed the labs, radiology results, and diagnostic studies.    Laboratory Data:      Results from last 7 days  Lab Units 10/06/17  0532 10/05/17  0542 10/04/17  1933   WBC 10*3/mm3 8.26 10.47 12.74*   HEMOGLOBIN g/dL 11.6* 10.8* 12.1   HEMATOCRIT % 35.6* 32.7* 36.8*   PLATELETS 10*3/mm3 165 161 170          Results from last 7 days  Lab Units 10/06/17  0532 10/05/17  0542 10/04/17  1933   SODIUM mmol/L 141 138 139   POTASSIUM mmol/L 3.6 3.7 4.0   CHLORIDE mmol/L 107 105 100   CO2 mmol/L 26.0 26.0 24.0   BUN mg/dL 17 18 14   CREATININE mg/dL 0.50 0.52 0.63   CALCIUM mg/dL 8.4 8.3* 8.7   GLUCOSE mg/dL 96 99 113*       Culture Data:   Blood Culture   Date Value Ref Range Status   10/04/2017 No growth at less than 24 hours  Preliminary   10/04/2017 No growth at less than 24 hours  Preliminary     Respiratory Culture   Date Value Ref Range Status   10/04/2017 Scant growth (1+) Normal Respiratory Jackie  Preliminary   10/04/2017 Scant growth (1+) Yeast isolated (A)  Preliminary       Radiology Data:   Imaging Results (last 24 hours)     Procedure Component Value Units Date/Time    US Carotid Bilateral [[id number]] Collected:  10/05/17 1616     Updated:  10/05/17 1620    Narrative:       History: Stroke       Impression:       Impression:  1. There is less than 50% stenosis of the right internal carotid artery.  2. There is 50-69% stenosis of the left internal carotid artery.  3. Antegrade flow is demonstrated in the right vertebral. The left  vertebral was not visualized.     Comments: Bilateral carotid vertebral arterial duplex scan was  performed.     Grayscale imaging shows intimal thickening and calcified elements at the  carotid bifurcation. The right internal carotid artery peak systolic  velocity is 75.6 cm/sec. The end-diastolic velocity is 24.6 cm/sec. The  right ICA/CCA ratio is approximately 0.75 . These findings correlate  with less than 50% stenosis of the right internal carotid artery.     Grayscale imaging shows intimal thickening and calcified elements at the  carotid bifurcation. The  left internal carotid artery peak systolic  velocity is 129 cm/sec. The end-diastolic velocity is 39.3 cm/sec. The  left ICA/CCA ratio is approximately 1.28 . These findings correlate with  50-69% stenosis of the left internal carotid artery.     There is greater than 50% stenosis of the right external carotid artery.  This report was finalized on 10/05/2017 16:17 by Dr. Negrito Masters MD.          I have reviewed the patient current medications.     Assessment/Plan     Hospital Problem List     CVA (cerebral vascular accident)          1. CVA with abnormal left internal carotid stenosis on duplex ultrasound will check CTA carotids to further evaluate I am not sure patient is a very good candidate surgically due to her overall severity of her stroke.  Continue to monitor.    2. Concern of aspiration pna-no fever, normal white count. Received rocephin x 2 days at [facility]. Started on clinda here. Cultures pending. Suctioning for secretions.  Will add scopolamine patch to help with secretions.    3. PT/OT/ST    4. Right rib fractures    5. Hx of TBI with significant underlying deficiencies                  Discharge Planning: I expect the patient to be discharged to facility in 2-4 days.    Humberto Russell MD   10/06/17   9:49 AM

## 2017-10-06 NOTE — CONSULTS
Referral from nurses regarding patient was transferred from [facility] with c.diff. No active diarrhea this admission to [facility]. Contacted [facility] to obtain the result of c.diff testing. Nurses informed me that patient had a few loose stools after antibiotic started to treat cellulitis. Nurses had discussed with MD who stopped the antibiotic and stated to obtain a stool to test for c diff. Patient did not stool to obtain a specimen. Patient has been taken out of Isolation. Claudine Fallon RN, Infection Control.

## 2017-10-07 LAB
ANION GAP SERPL CALCULATED.3IONS-SCNC: 10 MMOL/L (ref 4–13)
BACTERIA SPEC AEROBE CULT: NORMAL
BASOPHILS # BLD AUTO: 0.02 10*3/MM3 (ref 0–0.2)
BASOPHILS NFR BLD AUTO: 0.3 % (ref 0–2)
BUN BLD-MCNC: 14 MG/DL (ref 5–21)
BUN/CREAT SERPL: 30.4 (ref 7–25)
CALCIUM SPEC-SCNC: 8.6 MG/DL (ref 8.4–10.4)
CHLORIDE SERPL-SCNC: 108 MMOL/L (ref 98–110)
CO2 SERPL-SCNC: 24 MMOL/L (ref 24–31)
CREAT BLD-MCNC: 0.46 MG/DL (ref 0.5–1.4)
DEPRECATED RDW RBC AUTO: 51.4 FL (ref 40–54)
EOSINOPHIL # BLD AUTO: 0 10*3/MM3 (ref 0–0.7)
EOSINOPHIL NFR BLD AUTO: 0 % (ref 0–4)
ERYTHROCYTE [DISTWIDTH] IN BLOOD BY AUTOMATED COUNT: 14.8 % (ref 12–15)
GFR SERPL CREATININE-BSD FRML MDRD: >150 ML/MIN/1.73
GLUCOSE BLD-MCNC: 109 MG/DL (ref 70–100)
HCT VFR BLD AUTO: 34.5 % (ref 37–47)
HGB BLD-MCNC: 11.1 G/DL (ref 12–16)
IMM GRANULOCYTES # BLD: 0.04 10*3/MM3 (ref 0–0.03)
IMM GRANULOCYTES NFR BLD: 0.6 % (ref 0–5)
LYMPHOCYTES # BLD AUTO: 1.52 10*3/MM3 (ref 0.72–4.86)
LYMPHOCYTES NFR BLD AUTO: 20.9 % (ref 15–45)
MCH RBC QN AUTO: 30.8 PG (ref 28–32)
MCHC RBC AUTO-ENTMCNC: 32.2 G/DL (ref 33–36)
MCV RBC AUTO: 95.8 FL (ref 82–98)
MONOCYTES # BLD AUTO: 0.9 10*3/MM3 (ref 0.19–1.3)
MONOCYTES NFR BLD AUTO: 12.4 % (ref 4–12)
NEUTROPHILS # BLD AUTO: 4.79 10*3/MM3 (ref 1.87–8.4)
NEUTROPHILS NFR BLD AUTO: 65.8 % (ref 39–78)
PLATELET # BLD AUTO: 170 10*3/MM3 (ref 130–400)
PMV BLD AUTO: 11.8 FL (ref 6–12)
POTASSIUM BLD-SCNC: 3.5 MMOL/L (ref 3.5–5.3)
RBC # BLD AUTO: 3.6 10*6/MM3 (ref 4.2–5.4)
SODIUM BLD-SCNC: 142 MMOL/L (ref 135–145)
WBC NRBC COR # BLD: 7.27 10*3/MM3 (ref 4.8–10.8)

## 2017-10-07 PROCEDURE — 25010000002 FUROSEMIDE PER 20 MG: Performed by: FAMILY MEDICINE

## 2017-10-07 PROCEDURE — 25010000002 HYDROMORPHONE PER 4 MG: Performed by: INTERNAL MEDICINE

## 2017-10-07 PROCEDURE — 99232 SBSQ HOSP IP/OBS MODERATE 35: CPT | Performed by: PSYCHIATRY & NEUROLOGY

## 2017-10-07 PROCEDURE — 80048 BASIC METABOLIC PNL TOTAL CA: CPT | Performed by: FAMILY MEDICINE

## 2017-10-07 PROCEDURE — 85025 COMPLETE CBC W/AUTO DIFF WBC: CPT | Performed by: FAMILY MEDICINE

## 2017-10-07 PROCEDURE — 97535 SELF CARE MNGMENT TRAINING: CPT

## 2017-10-07 PROCEDURE — 25010000002 ENOXAPARIN PER 10 MG: Performed by: FAMILY MEDICINE

## 2017-10-07 PROCEDURE — 97530 THERAPEUTIC ACTIVITIES: CPT

## 2017-10-07 RX ORDER — SCOLOPAMINE TRANSDERMAL SYSTEM 1 MG/1
2 PATCH, EXTENDED RELEASE TRANSDERMAL
Status: DISCONTINUED | OUTPATIENT
Start: 2017-10-09 | End: 2017-10-14

## 2017-10-07 RX ADMIN — CLINDAMYCIN PHOSPHATE 300 MG: 6 INJECTION, SOLUTION INTRAVENOUS at 17:58

## 2017-10-07 RX ADMIN — CLINDAMYCIN PHOSPHATE 300 MG: 6 INJECTION, SOLUTION INTRAVENOUS at 09:56

## 2017-10-07 RX ADMIN — HYDROMORPHONE HYDROCHLORIDE 0.5 MG: 1 INJECTION, SOLUTION INTRAMUSCULAR; INTRAVENOUS; SUBCUTANEOUS at 11:04

## 2017-10-07 RX ADMIN — NICOTINE 1 PATCH: 21 PATCH, EXTENDED RELEASE TRANSDERMAL at 18:03

## 2017-10-07 RX ADMIN — ENOXAPARIN SODIUM 40 MG: 40 INJECTION SUBCUTANEOUS at 08:31

## 2017-10-07 RX ADMIN — FUROSEMIDE 20 MG: 10 INJECTION, SOLUTION INTRAMUSCULAR; INTRAVENOUS at 08:31

## 2017-10-07 RX ADMIN — ASPIRIN 300 MG: 300 SUPPOSITORY RECTAL at 09:00

## 2017-10-07 RX ADMIN — DEXTROSE MONOHYDRATE, SODIUM CHLORIDE, AND POTASSIUM CHLORIDE 100 ML/HR: 50; 4.5; 1.49 INJECTION, SOLUTION INTRAVENOUS at 17:24

## 2017-10-07 RX ADMIN — DEXTROSE MONOHYDRATE, SODIUM CHLORIDE, AND POTASSIUM CHLORIDE 100 ML/HR: 50; 4.5; 1.49 INJECTION, SOLUTION INTRAVENOUS at 07:02

## 2017-10-07 RX ADMIN — CLINDAMYCIN PHOSPHATE 300 MG: 6 INJECTION, SOLUTION INTRAVENOUS at 04:21

## 2017-10-07 NOTE — PLAN OF CARE
Problem: Patient Care Overview (Adult)  Goal: Plan of Care Review  Outcome: Ongoing (interventions implemented as appropriate)    10/07/17 0532   Coping/Psychosocial Response Interventions   Plan Of Care Reviewed With patient   Patient Care Overview   Progress no change   Outcome Evaluation   Outcome Summary/Follow up Plan Pt. confused and speech is garbled and not understandable, continues to have secretions and encouraged to cough, suctioned PRN (when pt. would allow), lung sounds coarse, uncooperative and continues to yell for staff as they pass the room.          Problem: Stroke (Ischemic) (Adult)  Goal: Signs and Symptoms of Listed Potential Problems Will be Absent or Manageable (Stroke)  Outcome: Ongoing (interventions implemented as appropriate)    Problem: Fall Risk (Adult)  Goal: Absence of Falls  Outcome: Ongoing (interventions implemented as appropriate)    Problem: Wound, Traumatic, Nonburn (Adult)  Goal: Signs and Symptoms of Listed Potential Problems Will be Absent or Manageable (Wound, Traumatic, Nonburn)  Outcome: Ongoing (interventions implemented as appropriate)    Problem: Pressure Ulcer Risk (Chuy Scale) (Adult,Obstetrics,Pediatric)  Goal: Skin Integrity  Outcome: Ongoing (interventions implemented as appropriate)

## 2017-10-07 NOTE — PROGRESS NOTES
[facility] Medicine Services  INPATIENT PROGRESS NOTE    Length of Stay: 3  Date of Admission: 10/4/2017  Primary Care Physician: ROBERTH Beckham    Subjective   Chief Complaint: AMS, slurred speech, secretions  HPI   Patient unable communicate still with word salad significant secretions unable to maintain any count of communication.  No family here. No significant changes.      Review of Systems     We will to obtain due to her current mental status    Objective    Temp:  [97.5 °F (36.4 °C)-99.2 °F (37.3 °C)] 97.7 °F (36.5 °C)  Heart Rate:  [62-76] 63  Resp:  [18-20] 18  BP: (118-148)/() 144/80  Physical Exam   Constitutional:   Awake, unable to communicate, garbled speech, congested, secretions   HENT:   Head: Normocephalic and atraumatic.   Eyes: Conjunctivae and EOM are normal. Pupils are equal, round, and reactive to light.   Neck: Neck supple. No JVD present. No thyromegaly present.   Cardiovascular: Normal rate, regular rhythm, normal heart sounds and intact distal pulses.  Exam reveals no gallop and no friction rub.    No murmur heard.  Pulmonary/Chest: Effort normal and breath sounds normal. No respiratory distress. She has no wheezes. She has no rales. She exhibits no tenderness.   Abdominal: Soft. Bowel sounds are normal. She exhibits no distension. There is no tenderness. There is no rebound and no guarding.   Genitourinary:   Genitourinary Comments: Walsh in place   Musculoskeletal: Normal range of motion. She exhibits no edema, tenderness or deformity.   Lymphadenopathy:     She has no cervical adenopathy.   Neurological: She is alert. She displays normal reflexes. No cranial nerve deficit. She exhibits normal muscle tone.   Moving right arm, not answering questions or following commands, contractures of left extremities   Skin: Skin is warm and dry. No rash noted.           Results Review:  I have reviewed the labs, radiology results, and diagnostic  studies.    Laboratory Data:     Results from last 7 days  Lab Units 10/07/17  0432 10/06/17  0532 10/05/17  0542   WBC 10*3/mm3 7.27 8.26 10.47   HEMOGLOBIN g/dL 11.1* 11.6* 10.8*   HEMATOCRIT % 34.5* 35.6* 32.7*   PLATELETS 10*3/mm3 170 165 161          Results from last 7 days  Lab Units 10/07/17  0432 10/06/17  0532 10/05/17  0542   SODIUM mmol/L 142 141 138   POTASSIUM mmol/L 3.5 3.6 3.7   CHLORIDE mmol/L 108 107 105   CO2 mmol/L 24.0 26.0 26.0   BUN mg/dL 14 17 18   CREATININE mg/dL 0.46* 0.50 0.52   CALCIUM mg/dL 8.6 8.4 8.3*   GLUCOSE mg/dL 109* 96 99       Culture Data:   Blood Culture   Date Value Ref Range Status   10/04/2017 No growth at less than 24 hours  Preliminary   10/04/2017 No growth at less than 24 hours  Preliminary     Respiratory Culture   Date Value Ref Range Status   10/04/2017 Scant growth (1+) Normal Respiratory Jackie  Preliminary   10/04/2017 Scant growth (1+) Yeast isolated (A)  Preliminary       Radiology Data:   Imaging Results (last 24 hours)     Procedure Component Value Units Date/Time    CT Angiogram Carotids [[id number]] Updated:  10/06/17 1836          I have reviewed the patient current medications.     Assessment/Plan     Hospital Problem List     CVA (cerebral vascular accident)          1. CVA with abnormal left internal carotid CTA pending unsure if could get done  2. Concern of aspiration pna-no fever, normal white count. Received rocephin x 2 days at [facility]. Started on clinda here. Cultures pending. Suctioning for secretions.  Continue scopolamine patch to help with secretions.    3. PT/OT/ST    4. Right rib fractures    5. Hx of TBI with significant underlying deficiencies                  Discharge Planning: I expect the patient to be discharged to facility in 2-4 days.    Humberto Russell MD   10/07/17   4:04 PM

## 2017-10-07 NOTE — THERAPY TREATMENT NOTE
Acute Care - Occupational Therapy Treatment Note  [facility]     Patient Name: Pallavi Yee  : 1980  MRN: [id number]  Today's Date: 10/7/2017  Onset of Illness/Injury or Date of Surgery Date: 10/04/17  Date of Referral to OT: 10/05/17  Referring Physician: Dr. Guillen      Admit Date: 10/4/2017    Visit Dx:     ICD-10-CM ICD-9-CM   1. Oropharyngeal dysphagia R13.12 787.22   2. Mobility impaired Z74.09 799.89   3. Impaired mobility and ADLs Z74.09 799.89     Patient Active Problem List   Diagnosis   • CVA (cerebral vascular accident)             Adult Rehabilitation Note       10/07/17 0915 10/06/17 1103 10/05/17 0815    Rehab Assessment/Intervention    Discipline occupational therapy assistant  -TS speech language pathologist  -TM     Document Type therapy note (daily note)  -TS therapy note (daily note)  -TM     Subjective Information agree to therapy   pt cooperates for tx  -TS --   Alert, cooperative for 1x ice chip  -TM     Patient Effort, Rehab Treatment fair  -TS poor  -TM     Precautions/Limitations fall precautions   expressive aphasia  -TS      Recorded by [TS] RANDOLPH LukeA/L [TM] Fatuma Leger, CCC-SLP     Pain Assessment    Pain Assessment Peterson-Cuevas FACES  -TS      Peterson-Cuevas FACES Pain Rating 0  -TS      Recorded by [TS] RANDOLPH LukeA/L      Cognitive Assessment/Intervention    Follows Commands/Answers Questions 25% of the time;needs cueing  -TS      Personal Safety Interventions fall prevention program maintained;elopement precautions initiated  -TS      Recorded by [TS] RANDOLPH LukeA/L      Swallow Assessment/Intervention    Additional Documentation   Dysphagia/Swallow Intervention (Group)  -BN    Recorded by   [BN] Candice Aguirre CCC-SLP    Dysphagia/Swallow Intervention    Dysphagia/Swallow Therapeutic Feed  Upon ST arrival, pt was noted to have congested breath sounds and vocal quality with poor awareness of need to attempt to clear via  spontaneous throat clear or cough. Pt also unable to follow commands to attempt to clear despite max verbal cues and modeling. Unable to follow oral motor commands, with significant difficulty attending to tasks that SLP was attempting to conduct, with excessive, essentially unintelligible verbalizations. Pt did accept a 1x ice chip trial, with persistent verbalization attempts with the bolus positioned anteriorly in the oral cavity. Minimal to no bolus manipulation, with inconsistent, weak swallows with open mouth posture. Increased wet vocal quality, weak throat clearing, and coughing noted. No further PO trials presented secondary to the high risk for aspiration. Unable to follow other attempted commands to participate in tx exercises.   -TM Pt will tolerate PO trials with SLP without s/s of aspiration.   -BN    Recorded by  [TM] Fatuma Leger, CCC-SLP [BN] Candice Aguirre, CCC-SLP    Bed Mobility, Assessment/Treatment    Bed Mobility, Assistive Device bed rails;draw sheet  -TS      Bed Mobility, Scoot/Bridge, Joseph maximum assist (25% patient effort)  -TS      Bed Mobility, Comment pt assisted with scooting herself toward HOB with RUE and was able to straighten herself out by using bed rail and pulling herself toward R  -TS      Recorded by [TS] RANDOLPH LukeA/L      Upper Body Dressing Assessment/Training    UB Dressing Assess/Train, Clothing Type donning:;hospital gown  -TS      UB Dressing Assess/Train, Position supine  -TS      UB Dressing Assess/Train, Joseph moderate assist (50% patient effort)  -TS      UB Dressing Assess/Train, Comment pt was able to follow commands to raise RUE to assist in donning of gown  -TS      Recorded by [TS] RANDOLPH LukeA/L      Lower Body Dressing Assessment/Training    LB Dressing Assess/Train, Clothing Type donning:;socks  -TS      LB Dressing Assess/Train, Position supine  -TS      LB Dressing Assess/Train, Joseph dependent  (less than 25% patient effort)  -TS      Recorded by [TS] Barbara Ramirez SERNA/L      Grooming Assessment/Training    Grooming Assess/Train, Position supine  -TS      Grooming Assess/Train, Indepen Level set up required  -TS      Grooming Assess/Train, Comment pt washed face and was able to wipe mouth after she was provided we wash clothe  -TS      Recorded by [TS] Barbara Ramirez SERNA/L      Therapy Exercises    Left Upper Extremity PROM:;5 reps;supine   pt assisted with PROM/SROM with shoulder flexion/extension  -TS      Recorded by [TS] Barbara Ramirez, SERNA/L      Positioning and Restraints    Pre-Treatment Position in bed  -TS      Post Treatment Position bed  -TS      In Bed fowlers;call light within reach;encouraged to call for assist;exit alarm on;side rails up x2  -TS      Recorded by [TS] Barbara Ramirez SERNA/L        User Key  (r) = Recorded By, (t) = Taken By, (c) = Cosigned By    Initials Name Effective Dates    TM Fatuma Leger, CCC-SLP 08/02/16 -     TS RANDOLPH LukeA/L 08/02/16 -     BN Candice Aguirre CCC-SLP 08/02/16 -                 OT Goals       10/05/17 1615          Bed Mobility OT LTG    Bed Mobility OT LTG, Date Established 10/05/17  -MM      Bed Mobility OT LTG, Time to Achieve by discharge  -MM      Bed Mobility OT LTG, Activity Type all bed mobility  -MM      Bed Mobility OT LTG, [place] Level maximum assist (25% patient effort)  -MM      Bed Mobility OT LTG, Assist Device bed rails  -MM      Range of Motion OT LTG    Range of Motion Goal OT LTG, Date Established 10/05/17  -MM      Range of Motion Goal OT LTG, Time to Achieve by discharge  -MM      Range of Motion Goal OT LTG, AROM Measure Pt will progress with ROM to L UE/LE to decrease tone and prevent contracture.  -MM      Eating Self-Feeding OT LTG    Eat Self Feeding Goal OT LTG, Date Established 10/05/17  -MM      Eat Self Feeding Goal OT LTG, Time to Achieve by discharge  -MM      Eat  Self Feed Goal OT LTG, [place] Level supervision required;set up required  -MM      Eat Self Feeding Goal OT LTG, Adaptive Equip utensils, large   -MM      Eat Self Feeding Goal OT LTG, Position reclined, sitting in bed  -MM      Grooming OT LTG    Grooming Goal OT LTG, Date Established 10/05/17  -MM      Grooming Goal OT LTG, Time to Achieve by discharge  -MM      Grooming Goal OT LTG, [place] Level moderate assist (50% patient effort)  -MM      Grooming Goal OT LTG, Position reclined, sitting in bed  -MM        User Key  (r) = Recorded By, (t) = Taken By, (c) = Cosigned By    Initials Name Provider Type    MM Sean Ambriz, OTR/L Occupational Therapist          Occupational Therapy Education     Title: PT OT SLP Therapies (Active)     Topic: Occupational Therapy (Active)     Point: ADL training (Active)    Description: Instruct learner(s) on proper safety adaptation and remediation techniques during self care or transfers.   Instruct in proper use of assistive devices.    Learning Progress Summary    Learner Readiness Method Response Comment Documented by Status   Patient Acceptance E,D VU,NR ADLs, ROM  10/07/17 1007 Done    Acceptance E NR pt and facility staff educated on OT role, benefits.  10/05/17 1613 Active   Other Acceptance E NR pt and facility staff educated on OT role, benefits.  10/05/17 1613 Active                      User Key     Initials Effective Dates Name Provider Type Discipline     08/02/16 -  KAREEM Luke/L Occupational Therapy Assistant OT     10/21/16 -  Sean Ambriz OTR/L Occupational Therapist OT                  OT Recommendation and Plan  Anticipated Discharge Disposition: assisted living, extended care facility  Planned Therapy Interventions: activity intolerance, adaptive equipment training, ADL retraining, balance training, bed mobility training, energy conservation, fine motor coordination training, home exercise program, motor  coordination training, neuromuscular re-education, strengthening, stretching, ROM (Range of Motion)  Therapy Frequency: 3-5 times/wk  Plan of Care Review  Plan Of Care Reviewed With: patient  Progress: progress toward functional goals is gradual  Outcome Summary/Follow up Plan: Pt demonstrates increased awareness to self and participation in ADL tasks. Pt able to produce 5-10 intelligible words through tx and was able to communicate well enough given extra time that Gutierres was able to understand pt wants for tx. Pt assisted with UB dressing and grooming while supine in bed. Continue OT POC         Outcome Measures       10/07/17 1000 10/05/17 1600 10/05/17 1400    How much help from another person do you currently need...    Turning from your back to your side while in flat bed without using bedrails?   1  -MS (r) RM (t) MS (c)    Moving from lying on back to sitting on the side of a flat bed without bedrails?   1  -MS (r) RM (t) MS (c)    Moving to and from a bed to a chair (including a wheelchair)?   1  -MS (r) RM (t) MS (c)    Standing up from a chair using your arms (e.g., wheelchair, bedside chair)?   1  -MS (r) RM (t) MS (c)    Climbing 3-5 steps with a railing?   1  -MS (r) RM (t) MS (c)    To walk in hospital room?   1  -MS (r) RM (t) MS (c)    AM-PAC 6 Clicks Score   6  -MS (r) RM (t)    How much help from another is currently needed...    Putting on and taking off regular lower body clothing? 1  -TS 1  -MM     Bathing (including washing, rinsing, and drying) 1  -TS 1  -MM     Toileting (which includes using toilet bed pan or urinal) 1  -TS 1  -MM     Putting on and taking off regular upper body clothing 2  -TS 1  -MM     Taking care of personal grooming (such as brushing teeth) 2  -TS 1  -MM     Eating meals 2  -TS 1  -MM     Score 9  -TS 6  -MM     Functional Assessment    Outcome Measure Options AM-PAC 6 Clicks Daily Activity (OT)  -TS AM-PAC 6 Clicks Daily Activity (OT)  -MM AM-PAC 6 Clicks Basic Mobility  (PT)  -MS (r) RM (t) MS (c)      User Key  (r) = Recorded By, (t) = Taken By, (c) = Cosigned By    Initials Name Provider Type    TS KAREEM Luke/L Occupational Therapy Assistant    MS Trini PLUNKETT Spenser, PT DPT Physical Therapist    MM Sean Ambriz, OTR/L Occupational Therapist    RM Shashank Nuria, PT Student PT Student           Time Calculation:         Time Calculation- OT       10/07/17 1008          Time Calculation- OT    OT Start Time 0915  -TS      OT Stop Time 1000  -TS      OT Time Calculation (min) 45 min  -TS      Total Timed Code Minutes- OT 45 minute(s)  -TS      OT Received On 10/07/17  -TS        User Key  (r) = Recorded By, (t) = Taken By, (c) = Cosigned By    Initials Name Provider Type    TS Barbara Ramirez SERNA/L Occupational Therapy Assistant           Therapy Charges for Today     Code Description Service Date Service Provider Modifiers Qty    63479683106 HC OT SELF CARE/MGMT/TRAIN EA 15 MIN 10/7/2017 KAREEM Luke/L GO, KX 2    58193631421 HC OT THERAPEUTIC ACT EA 15 MIN 10/7/2017 RANDOLPH LukeA/L GO, KX 1          OT G-codes  OT Professional Judgement Used?: Yes  OT Functional Scales Options: AM-PAC 6 Clicks Daily Activity (OT)  Score: 6  Functional Limitation: Self care  Self Care Current Status (): 100 percent impaired, limited or restricted  Self Care Goal Status (): At least 80 percent but less than 100 percent impaired, limited or restricted    KAREEM Lemus/YEN  10/7/2017

## 2017-10-07 NOTE — PROGRESS NOTES
Neurology Progress Note      Date of admission: 10/4/2017  5:24 PM  Date of visit: 10/7/2017    Chief Complaint:  Strokes    Subjective     Subjective:  Still quite dysarthric and still not moving her left arm.However she manages to keep taking off her clothes per nursing.   Patient does smoke so this is another risk factor for stroke.   Medications:  Current Facility-Administered Medications   Medication Dose Route Frequency Provider Last Rate Last Dose   • acetaminophen (TYLENOL) suppository 650 mg  650 mg Rectal Q4H PRN Latrice Guillen MD   650 mg at 10/05/17 1416   • aspirin chewable tablet 81 mg  81 mg Oral Daily Albert Kraft MD        Or   • aspirin suppository 300 mg  300 mg Rectal Daily Albert Kraft MD   300 mg at 10/07/17 0900   • clindamycin (CLEOCIN) IVPB 300 mg  300 mg Intravenous Q8H Latrice Guillen MD 0 mL/hr at 10/06/17 0355 300 mg at 10/07/17 0956   • dextrose 5 % and sodium chloride 0.45 % with KCl 20 mEq/L infusion  100 mL/hr Intravenous Continuous Latrice Guillen  mL/hr at 10/07/17 1724 100 mL/hr at 10/07/17 1724   • enoxaparin (LOVENOX) syringe 40 mg  40 mg Subcutaneous Daily Latrice Guillen MD   40 mg at 10/07/17 0831   • furosemide (LASIX) injection 20 mg  20 mg Intravenous Daily Latrice Guillen MD   20 mg at 10/07/17 0831   • HYDROmorphone (DILAUDID) injection 0.5 mg  0.5 mg Intravenous Q2H PRN Humberto Russell MD   0.5 mg at 10/07/17 1104   • iopamidol (ISOVUE-370) 76 % injection 150 mL  150 mL Intravenous Once in imaging Humberto Russell MD       • labetalol (NORMODYNE,TRANDATE) injection 10 mg  10 mg Intravenous Q10 Min PRN Albert Kraft MD       • nicotine (NICODERM CQ) 21 MG/24HR patch 1 patch  1 patch Transdermal Q24H Humberto Russell MD   1 patch at 10/06/17 2303   • ondansetron (ZOFRAN) injection 4 mg  4 mg Intravenous Q6H PRN Albert Kraft MD       • [START ON 10/9/2017] Scopolamine (TRANSDERM-SCOP) 1.5 MG/3DAYS patch 2 patch  2 patch  Transdermal Q72H Humberto Russell MD       • sodium chloride 0.9 % flush 1-10 mL  1-10 mL Intravenous PRN Latrice Guillen MD       • sodium chloride 0.9 % flush 1-10 mL  1-10 mL Intravenous PRN Albert Kraft MD           Review of Systems:   -A 14 point review of systems is completed and is difficult to obtain but she was able to express that she was  feeling cold.     Objective     Objective      Vital Signs  Temp:  [97.5 °F (36.4 °C)-99.2 °F (37.3 °C)] 97.7 °F (36.5 °C)  Heart Rate:  [62-76] 63  Resp:  [18-20] 18  BP: (118-148)/() 144/80    Physical Exam:    HEENT:  Neck supple  CVS:  Regular rate and rhythm.  No murmurs  Carotid Examination:  No bruits  Lungs:  Clear to auscultation  Abdomen:  Non-tender, Non-distended  Extremities:  No signs of peripheral edema    Neurologic Exam:    -Awake, Alert, Very dysarthric  -Not following commands    Cranial nerves II through XII about the same.    Motor: Moving right upper and right lower extremity well.  Not moving her left upper extremity.    Results Review:    I reviewed the patient's new clinical results.    Lab Results (last 24 hours)     Procedure Component Value Units Date/Time    Blood Culture With ALECIA - Blood, [[id number]]  (Normal) Collected:  10/04/17 1930    Specimen:  Blood from Arm, Left Updated:  10/06/17 2016     Blood Culture No growth at 2 days    Blood Culture With ALECIA - Blood, [[id number]]  (Normal) Collected:  10/04/17 1950    Specimen:  Blood from Hand, Left Updated:  10/06/17 2016     Blood Culture No growth at 2 days    CBC & Differential [[id number]] Collected:  10/07/17 0432    Specimen:  Blood Updated:  10/07/17 0456    Narrative:       The following orders were created for panel order CBC & Differential.  Procedure                               Abnormality         Status                     ---------                               -----------         ------                     CBC Auto Differential[[id number]]        Abnormal             Final result                 Please view results for these tests on the individual orders.    CBC Auto Differential [[id number]]  (Abnormal) Collected:  10/07/17 0432    Specimen:  Blood Updated:  10/07/17 0456     WBC 7.27 10*3/mm3      RBC 3.60 (L) 10*6/mm3      Hemoglobin 11.1 (L) g/dL      Hematocrit 34.5 (L) %      MCV 95.8 fL      MCH 30.8 pg      MCHC 32.2 (L) g/dL      RDW 14.8 %      RDW-SD 51.4 fl      MPV 11.8 fL      Platelets 170 10*3/mm3      Neutrophil % 65.8 %      Lymphocyte % 20.9 %      Monocyte % 12.4 (H) %      Eosinophil % 0.0 %      Basophil % 0.3 %      Immature Grans % 0.6 %      Neutrophils, Absolute 4.79 10*3/mm3      Lymphocytes, Absolute 1.52 10*3/mm3      Monocytes, Absolute 0.90 10*3/mm3      Eosinophils, Absolute 0.00 10*3/mm3      Basophils, Absolute 0.02 10*3/mm3      Immature Grans, Absolute 0.04 (H) 10*3/mm3     Basic Metabolic Panel [[id number]]  (Abnormal) Collected:  10/07/17 0432    Specimen:  Blood Updated:  10/07/17 0509     Glucose 109 (H) mg/dL      BUN 14 mg/dL      Creatinine 0.46 (L) mg/dL      Sodium 142 mmol/L      Potassium 3.5 mmol/L      Chloride 108 mmol/L      CO2 24.0 mmol/L      Calcium 8.6 mg/dL      eGFR Non African Amer >150 mL/min/1.73      BUN/Creatinine Ratio 30.4 (H)     Anion Gap 10.0 mmol/L     Narrative:       GFR Normal >60  Chronic Kidney Disease <60  Kidney Failure <15    Urine Culture - Urine, Urine, Clean Catch [[id number]]  (Normal) Collected:  10/04/17 2353    Specimen:  Urine from Urine, Catheter Updated:  10/07/17 0728     Urine Culture No growth at 2 days    Wound Culture - Wound, Arm, Right [[id number]]  (Normal) Collected:  10/05/17 0000    Specimen:  Wound from Arm, Right Updated:  10/07/17 1030     Wound Culture No growth at 2 days     Gram Stain Result No WBCs or organisms seen        Imaging Results (last 24 hours)     Procedure Component Value Units Date/Time    CT Angiogram Carotids [[id number]] Updated:  10/06/17 1839         Echocardiogram:   · Left ventricular wall thickness is consistent with mild concentric hypertrophy.  · Left ventricular systolic function is normal. Estimated EF appears to be in the range of 61 - 65%.  · No evidence of a patent foramen ovale.  · No significant valvular abnormalities are identified.  Assessment/Plan     Hospital Problem List    Active Problems:    CVA (cerebral vascular accident)    Impression:  1. Poor quality MRI of brain at outside hospital-due to motion artifact-- c/w acute ischemic strokes in the  left subcortical region that is small  right posterior internal capsule, right posterior mid brain, and right monika.  2. Left internal carotid artery stenosis at 50 to 69%.    Plan:    Await CTA of neck   Continue aspirin and Lipitor  Smoking cessation counseling when she is able to comprehend.   Continue PT/OT    Brenda Gan MD  10/07/17  5:50 PM

## 2017-10-07 NOTE — PLAN OF CARE
Problem: Patient Care Overview (Adult)  Goal: Plan of Care Review  Outcome: Ongoing (interventions implemented as appropriate)    10/07/17 1005   Coping/Psychosocial Response Interventions   Plan Of Care Reviewed With patient   Patient Care Overview   Progress progress toward functional goals is gradual   Outcome Evaluation   Outcome Summary/Follow up Plan Pt demonstrates increased awareness to self and participation in ADL tasks. Pt able to produce 5-10 intelligible words through tx and was able to communicate well enough given extra time that Gutierres was able to understand pt wants for tx. Pt assisted with UB dressing and grooming while supine in bed. Continue OT POC

## 2017-10-07 NOTE — PLAN OF CARE
Problem: Patient Care Overview (Adult)  Goal: Plan of Care Review  Outcome: Ongoing (interventions implemented as appropriate)    10/07/17 1508   Coping/Psychosocial Response Interventions   Plan Of Care Reviewed With patient   Patient Care Overview   Progress no change   Outcome Evaluation   Outcome Summary/Follow up Plan medicated for s/s pain x1...uncooperative at times...speech remains garbled...not able to understand...VSS...calls out frequently...safety maintained....no s/s respiratory distress noted...pt able to cough up secretions...continue to monitor        Goal: Adult Individualization and Mutuality  Outcome: Ongoing (interventions implemented as appropriate)  Goal: Discharge Needs Assessment  Outcome: Ongoing (interventions implemented as appropriate)    Problem: Stroke (Ischemic) (Adult)  Goal: Signs and Symptoms of Listed Potential Problems Will be Absent or Manageable (Stroke)  Outcome: Ongoing (interventions implemented as appropriate)    Problem: Fall Risk (Adult)  Goal: Absence of Falls  Outcome: Ongoing (interventions implemented as appropriate)    Problem: Wound, Traumatic, Nonburn (Adult)  Goal: Signs and Symptoms of Listed Potential Problems Will be Absent or Manageable (Wound, Traumatic, Nonburn)  Outcome: Ongoing (interventions implemented as appropriate)    Problem: Pressure Ulcer Risk (Chuy Scale) (Adult,Obstetrics,Pediatric)  Goal: Skin Integrity  Outcome: Ongoing (interventions implemented as appropriate)

## 2017-10-08 LAB
ANION GAP SERPL CALCULATED.3IONS-SCNC: 7 MMOL/L (ref 4–13)
BASOPHILS # BLD AUTO: 0.01 10*3/MM3 (ref 0–0.2)
BASOPHILS NFR BLD AUTO: 0.1 % (ref 0–2)
BUN BLD-MCNC: 10 MG/DL (ref 5–21)
BUN/CREAT SERPL: 23.8 (ref 7–25)
CALCIUM SPEC-SCNC: 8.3 MG/DL (ref 8.4–10.4)
CHLORIDE SERPL-SCNC: 107 MMOL/L (ref 98–110)
CO2 SERPL-SCNC: 24 MMOL/L (ref 24–31)
CREAT BLD-MCNC: 0.42 MG/DL (ref 0.5–1.4)
DEPRECATED RDW RBC AUTO: 50.2 FL (ref 40–54)
EOSINOPHIL # BLD AUTO: 0 10*3/MM3 (ref 0–0.7)
EOSINOPHIL NFR BLD AUTO: 0 % (ref 0–4)
ERYTHROCYTE [DISTWIDTH] IN BLOOD BY AUTOMATED COUNT: 14.7 % (ref 12–15)
GFR SERPL CREATININE-BSD FRML MDRD: >150 ML/MIN/1.73
GLUCOSE BLD-MCNC: 101 MG/DL (ref 70–100)
HCT VFR BLD AUTO: 33 % (ref 37–47)
HGB BLD-MCNC: 10.7 G/DL (ref 12–16)
IMM GRANULOCYTES # BLD: 0.04 10*3/MM3 (ref 0–0.03)
IMM GRANULOCYTES NFR BLD: 0.6 % (ref 0–5)
LYMPHOCYTES # BLD AUTO: 1.36 10*3/MM3 (ref 0.72–4.86)
LYMPHOCYTES NFR BLD AUTO: 20.4 % (ref 15–45)
MCH RBC QN AUTO: 30.8 PG (ref 28–32)
MCHC RBC AUTO-ENTMCNC: 32.4 G/DL (ref 33–36)
MCV RBC AUTO: 95.1 FL (ref 82–98)
MONOCYTES # BLD AUTO: 0.8 10*3/MM3 (ref 0.19–1.3)
MONOCYTES NFR BLD AUTO: 12 % (ref 4–12)
NEUTROPHILS # BLD AUTO: 4.46 10*3/MM3 (ref 1.87–8.4)
NEUTROPHILS NFR BLD AUTO: 66.9 % (ref 39–78)
PLATELET # BLD AUTO: 174 10*3/MM3 (ref 130–400)
PMV BLD AUTO: 12.1 FL (ref 6–12)
POTASSIUM BLD-SCNC: 3.7 MMOL/L (ref 3.5–5.3)
RBC # BLD AUTO: 3.47 10*6/MM3 (ref 4.2–5.4)
SODIUM BLD-SCNC: 138 MMOL/L (ref 135–145)
WBC NRBC COR # BLD: 6.67 10*3/MM3 (ref 4.8–10.8)

## 2017-10-08 PROCEDURE — 25010000002 ENOXAPARIN PER 10 MG: Performed by: FAMILY MEDICINE

## 2017-10-08 PROCEDURE — 25010000002 FUROSEMIDE PER 20 MG: Performed by: FAMILY MEDICINE

## 2017-10-08 PROCEDURE — 80048 BASIC METABOLIC PNL TOTAL CA: CPT | Performed by: FAMILY MEDICINE

## 2017-10-08 PROCEDURE — 25010000002 HYDROMORPHONE PER 4 MG: Performed by: INTERNAL MEDICINE

## 2017-10-08 PROCEDURE — 85025 COMPLETE CBC W/AUTO DIFF WBC: CPT | Performed by: FAMILY MEDICINE

## 2017-10-08 RX ORDER — BISACODYL 10 MG
10 SUPPOSITORY, RECTAL RECTAL DAILY PRN
Status: DISCONTINUED | OUTPATIENT
Start: 2017-10-08 | End: 2017-10-19 | Stop reason: HOSPADM

## 2017-10-08 RX ORDER — BISACODYL 10 MG
10 SUPPOSITORY, RECTAL RECTAL ONCE
Status: COMPLETED | OUTPATIENT
Start: 2017-10-08 | End: 2017-10-08

## 2017-10-08 RX ADMIN — ENOXAPARIN SODIUM 40 MG: 40 INJECTION SUBCUTANEOUS at 08:17

## 2017-10-08 RX ADMIN — DEXTROSE MONOHYDRATE, SODIUM CHLORIDE, AND POTASSIUM CHLORIDE 100 ML/HR: 50; 4.5; 1.49 INJECTION, SOLUTION INTRAVENOUS at 03:27

## 2017-10-08 RX ADMIN — CLINDAMYCIN PHOSPHATE 300 MG: 6 INJECTION, SOLUTION INTRAVENOUS at 03:27

## 2017-10-08 RX ADMIN — CLINDAMYCIN PHOSPHATE 300 MG: 6 INJECTION, SOLUTION INTRAVENOUS at 18:00

## 2017-10-08 RX ADMIN — HYDROMORPHONE HYDROCHLORIDE 0.5 MG: 1 INJECTION, SOLUTION INTRAMUSCULAR; INTRAVENOUS; SUBCUTANEOUS at 08:15

## 2017-10-08 RX ADMIN — NICOTINE 1 PATCH: 21 PATCH, EXTENDED RELEASE TRANSDERMAL at 17:59

## 2017-10-08 RX ADMIN — ASPIRIN 300 MG: 300 SUPPOSITORY RECTAL at 08:15

## 2017-10-08 RX ADMIN — DEXTROSE MONOHYDRATE, SODIUM CHLORIDE, AND POTASSIUM CHLORIDE 100 ML/HR: 50; 4.5; 1.49 INJECTION, SOLUTION INTRAVENOUS at 17:53

## 2017-10-08 RX ADMIN — CLINDAMYCIN PHOSPHATE 300 MG: 6 INJECTION, SOLUTION INTRAVENOUS at 11:59

## 2017-10-08 RX ADMIN — FUROSEMIDE 20 MG: 10 INJECTION, SOLUTION INTRAMUSCULAR; INTRAVENOUS at 08:15

## 2017-10-08 RX ADMIN — BISACODYL 10 MG: 10 SUPPOSITORY RECTAL at 17:56

## 2017-10-08 NOTE — PLAN OF CARE
Problem: Patient Care Overview (Adult)  Goal: Plan of Care Review  Outcome: Ongoing (interventions implemented as appropriate)    10/08/17 0348   Coping/Psychosocial Response Interventions   Plan Of Care Reviewed With patient   Patient Care Overview   Progress no change   Outcome Evaluation   Outcome Summary/Follow up Plan Low grade temp; alert and oriented to self; speech remains garbled; answers yes and no questions at times; straight cath x 1; nih = 17; iv abx continue; safety maintained       Goal: Adult Individualization and Mutuality  Outcome: Ongoing (interventions implemented as appropriate)  Goal: Discharge Needs Assessment  Outcome: Ongoing (interventions implemented as appropriate)    Problem: Stroke (Ischemic) (Adult)  Goal: Signs and Symptoms of Listed Potential Problems Will be Absent or Manageable (Stroke)  Outcome: Ongoing (interventions implemented as appropriate)    Problem: Fall Risk (Adult)  Goal: Absence of Falls  Outcome: Ongoing (interventions implemented as appropriate)    Problem: Wound, Traumatic, Nonburn (Adult)  Goal: Signs and Symptoms of Listed Potential Problems Will be Absent or Manageable (Wound, Traumatic, Nonburn)  Outcome: Ongoing (interventions implemented as appropriate)    Problem: Pressure Ulcer Risk (Chuy Scale) (Adult,Obstetrics,Pediatric)  Goal: Skin Integrity  Outcome: Ongoing (interventions implemented as appropriate)

## 2017-10-08 NOTE — PLAN OF CARE
Problem: Patient Care Overview (Adult)  Goal: Plan of Care Review  Outcome: Ongoing (interventions implemented as appropriate)    10/08/17 1524   Coping/Psychosocial Response Interventions   Plan Of Care Reviewed With patient   Patient Care Overview   Progress no change   Outcome Evaluation   Outcome Summary/Follow up Plan medicated for s/s pain x1 today...still having severe dysarthria...voiding incontinent....CTA tmrw...safety maintained...VSS...continue to monito r       Goal: Adult Individualization and Mutuality  Outcome: Ongoing (interventions implemented as appropriate)  Goal: Discharge Needs Assessment  Outcome: Ongoing (interventions implemented as appropriate)    Problem: Stroke (Ischemic) (Adult)  Goal: Signs and Symptoms of Listed Potential Problems Will be Absent or Manageable (Stroke)  Outcome: Ongoing (interventions implemented as appropriate)    Problem: Fall Risk (Adult)  Goal: Absence of Falls  Outcome: Ongoing (interventions implemented as appropriate)    Problem: Wound, Traumatic, Nonburn (Adult)  Goal: Signs and Symptoms of Listed Potential Problems Will be Absent or Manageable (Wound, Traumatic, Nonburn)  Outcome: Ongoing (interventions implemented as appropriate)    Problem: Pressure Ulcer Risk (Chuy Scale) (Adult,Obstetrics,Pediatric)  Goal: Skin Integrity  Outcome: Ongoing (interventions implemented as appropriate)

## 2017-10-08 NOTE — PROGRESS NOTES
[facility] Medicine Services  INPATIENT PROGRESS NOTE    Length of Stay: 4  Date of Admission: 10/4/2017  Primary Care Physician: ROBERTH Beckham    Subjective   Chief Complaint: AMS, slurred speech, secretions  HPI   Patient unable communicate still with word salad significant secretions unable to maintain any count of communication.  No family here. No significant changes.      Review of Systems     We will to obtain due to her current mental status    Objective    Temp:  [97.7 °F (36.5 °C)-99.9 °F (37.7 °C)] 98.5 °F (36.9 °C)  Heart Rate:  [63-94] 80  Resp:  [16-22] 16  BP: (138-146)/() 146/100  Physical Exam   Constitutional:   Awake, unable to communicate, garbled speech, congested, secretions   HENT:   Head: Normocephalic and atraumatic.   Eyes: Conjunctivae and EOM are normal. Pupils are equal, round, and reactive to light.   Neck: Neck supple. No JVD present. No thyromegaly present.   Cardiovascular: Normal rate, regular rhythm, normal heart sounds and intact distal pulses.  Exam reveals no gallop and no friction rub.    No murmur heard.  Pulmonary/Chest: Effort normal and breath sounds normal. No respiratory distress. She has no wheezes. She has no rales. She exhibits no tenderness.   Abdominal: Soft. Bowel sounds are normal. She exhibits no distension. There is no tenderness. There is no rebound and no guarding.   Genitourinary:   Genitourinary Comments: Walsh in place   Musculoskeletal: Normal range of motion. She exhibits no edema, tenderness or deformity.   Lymphadenopathy:     She has no cervical adenopathy.   Neurological: She is alert. She displays normal reflexes. No cranial nerve deficit. She exhibits normal muscle tone.   Moving right arm, not answering questions or following commands, contractures of left extremities   Skin: Skin is warm and dry. No rash noted.           Results Review:  I have reviewed the labs, radiology results, and diagnostic  studies.    Laboratory Data:     Results from last 7 days  Lab Units 10/08/17  0427 10/07/17  0432 10/06/17  0532   WBC 10*3/mm3 6.67 7.27 8.26   HEMOGLOBIN g/dL 10.7* 11.1* 11.6*   HEMATOCRIT % 33.0* 34.5* 35.6*   PLATELETS 10*3/mm3 174 170 165          Results from last 7 days  Lab Units 10/08/17  0427 10/07/17  0432 10/06/17  0532   SODIUM mmol/L 138 142 141   POTASSIUM mmol/L 3.7 3.5 3.6   CHLORIDE mmol/L 107 108 107   CO2 mmol/L 24.0 24.0 26.0   BUN mg/dL 10 14 17   CREATININE mg/dL 0.42* 0.46* 0.50   CALCIUM mg/dL 8.3* 8.6 8.4   GLUCOSE mg/dL 101* 109* 96       Culture Data:   Blood Culture   Date Value Ref Range Status   10/04/2017 No growth at less than 24 hours  Preliminary   10/04/2017 No growth at less than 24 hours  Preliminary     Respiratory Culture   Date Value Ref Range Status   10/04/2017 Scant growth (1+) Normal Respiratory Jackie  Preliminary   10/04/2017 Scant growth (1+) Yeast isolated (A)  Preliminary       Radiology Data:   Imaging Results (last 24 hours)     ** No results found for the last 24 hours. **          I have reviewed the patient current medications.     Assessment/Plan     Hospital Problem List     CVA (cerebral vascular accident)          1. CVA with abnormal left internal carotid CTA pending unsure if could get done  2. Concern of aspiration pna-no fever, normal white count. Received rocephin x 2 days at [facility]. Started on clinda here. Cultures pending. Suctioning for secretions.  Continue scopolamine patch to help with secretions.    3. PT/OT/ST    4. Right rib fractures    5. Hx of TBI with significant underlying deficiencies    Probably going to need feeding tube will not tolerate dobhoff and probably would pull out PICC for TPN which is going to be of limited help.  Will consult GI              Discharge Planning: I expect the patient to be discharged to facility in 2-4 days.    Humberto Russell MD   10/08/17   11:30 AM

## 2017-10-09 LAB
ANION GAP SERPL CALCULATED.3IONS-SCNC: 8 MMOL/L (ref 4–13)
BACTERIA SPEC AEROBE CULT: NORMAL
BACTERIA SPEC AEROBE CULT: NORMAL
BASOPHILS # BLD AUTO: 0.02 10*3/MM3 (ref 0–0.2)
BASOPHILS NFR BLD AUTO: 0.2 % (ref 0–2)
BUN BLD-MCNC: 8 MG/DL (ref 5–21)
BUN/CREAT SERPL: 17.8 (ref 7–25)
CALCIUM SPEC-SCNC: 8.7 MG/DL (ref 8.4–10.4)
CHLORIDE SERPL-SCNC: 106 MMOL/L (ref 98–110)
CO2 SERPL-SCNC: 22 MMOL/L (ref 24–31)
CREAT BLD-MCNC: 0.45 MG/DL (ref 0.5–1.4)
DEPRECATED RDW RBC AUTO: 50.1 FL (ref 40–54)
EOSINOPHIL # BLD AUTO: 0 10*3/MM3 (ref 0–0.7)
EOSINOPHIL NFR BLD AUTO: 0 % (ref 0–4)
ERYTHROCYTE [DISTWIDTH] IN BLOOD BY AUTOMATED COUNT: 14.8 % (ref 12–15)
GFR SERPL CREATININE-BSD FRML MDRD: >150 ML/MIN/1.73
GLUCOSE BLD-MCNC: 96 MG/DL (ref 70–100)
HCT VFR BLD AUTO: 34.8 % (ref 37–47)
HGB BLD-MCNC: 11.6 G/DL (ref 12–16)
IMM GRANULOCYTES # BLD: 0.12 10*3/MM3 (ref 0–0.03)
IMM GRANULOCYTES NFR BLD: 1.5 % (ref 0–5)
LYMPHOCYTES # BLD AUTO: 1.35 10*3/MM3 (ref 0.72–4.86)
LYMPHOCYTES NFR BLD AUTO: 16.6 % (ref 15–45)
MCH RBC QN AUTO: 31.5 PG (ref 28–32)
MCHC RBC AUTO-ENTMCNC: 33.3 G/DL (ref 33–36)
MCV RBC AUTO: 94.6 FL (ref 82–98)
MONOCYTES # BLD AUTO: 0.98 10*3/MM3 (ref 0.19–1.3)
MONOCYTES NFR BLD AUTO: 12.1 % (ref 4–12)
NEUTROPHILS # BLD AUTO: 5.64 10*3/MM3 (ref 1.87–8.4)
NEUTROPHILS NFR BLD AUTO: 69.6 % (ref 39–78)
PLATELET # BLD AUTO: 176 10*3/MM3 (ref 130–400)
PMV BLD AUTO: 12.1 FL (ref 6–12)
POTASSIUM BLD-SCNC: 3.9 MMOL/L (ref 3.5–5.3)
RBC # BLD AUTO: 3.68 10*6/MM3 (ref 4.2–5.4)
SODIUM BLD-SCNC: 136 MMOL/L (ref 135–145)
WBC NRBC COR # BLD: 8.11 10*3/MM3 (ref 4.8–10.8)

## 2017-10-09 PROCEDURE — 25010000002 FUROSEMIDE PER 20 MG: Performed by: FAMILY MEDICINE

## 2017-10-09 PROCEDURE — 85025 COMPLETE CBC W/AUTO DIFF WBC: CPT | Performed by: FAMILY MEDICINE

## 2017-10-09 PROCEDURE — 99231 SBSQ HOSP IP/OBS SF/LOW 25: CPT | Performed by: PSYCHIATRY & NEUROLOGY

## 2017-10-09 PROCEDURE — 99222 1ST HOSP IP/OBS MODERATE 55: CPT | Performed by: INTERNAL MEDICINE

## 2017-10-09 PROCEDURE — 25010000002 ENOXAPARIN PER 10 MG: Performed by: FAMILY MEDICINE

## 2017-10-09 PROCEDURE — 80048 BASIC METABOLIC PNL TOTAL CA: CPT | Performed by: FAMILY MEDICINE

## 2017-10-09 RX ADMIN — ENOXAPARIN SODIUM 40 MG: 40 INJECTION SUBCUTANEOUS at 09:09

## 2017-10-09 RX ADMIN — ASPIRIN 300 MG: 300 SUPPOSITORY RECTAL at 09:09

## 2017-10-09 RX ADMIN — SCOPALAMINE 2 PATCH: 1 PATCH, EXTENDED RELEASE TRANSDERMAL at 18:10

## 2017-10-09 RX ADMIN — CLINDAMYCIN PHOSPHATE 300 MG: 6 INJECTION, SOLUTION INTRAVENOUS at 03:17

## 2017-10-09 RX ADMIN — NICOTINE 1 PATCH: 21 PATCH, EXTENDED RELEASE TRANSDERMAL at 18:15

## 2017-10-09 RX ADMIN — FUROSEMIDE 20 MG: 10 INJECTION, SOLUTION INTRAMUSCULAR; INTRAVENOUS at 09:09

## 2017-10-09 RX ADMIN — DEXTROSE MONOHYDRATE, SODIUM CHLORIDE, AND POTASSIUM CHLORIDE 100 ML/HR: 50; 4.5; 1.49 INJECTION, SOLUTION INTRAVENOUS at 14:22

## 2017-10-09 RX ADMIN — CLINDAMYCIN PHOSPHATE 300 MG: 6 INJECTION, SOLUTION INTRAVENOUS at 18:08

## 2017-10-09 RX ADMIN — CLINDAMYCIN PHOSPHATE 300 MG: 6 INJECTION, SOLUTION INTRAVENOUS at 10:37
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
## 2017-10-09 NOTE — PLAN OF CARE
Problem: Patient Care Overview (Adult)  Goal: Plan of Care Review  Outcome: Ongoing (interventions implemented as appropriate)    10/09/17 1600   Coping/Psychosocial Response Interventions   Plan Of Care Reviewed With patient;mother   Patient Care Overview   Progress no change   Outcome Evaluation   Outcome Summary/Follow up Plan NIH 16. Pt very agitated and combative at times hitting staff when trying to turn, bathe, obtain vital signs, suction, perform assessment etc. Suction performed as needed, pt very congested/coarse. Lasix given as ordered. Incontinence care provided. Frequent turning. NPO. PEG to be placed tomorrow. Discussed care of patient a few times with the patient's mother over phone. Mepilex to right elbow CDI. Bed alarm maintained, safety maintained. Will continue to monitor.        Goal: Adult Individualization and Mutuality  Outcome: Ongoing (interventions implemented as appropriate)  Goal: Discharge Needs Assessment  Outcome: Ongoing (interventions implemented as appropriate)    Problem: Stroke (Ischemic) (Adult)  Goal: Signs and Symptoms of Listed Potential Problems Will be Absent or Manageable (Stroke)  Outcome: Ongoing (interventions implemented as appropriate)    Problem: Fall Risk (Adult)  Goal: Absence of Falls  Outcome: Ongoing (interventions implemented as appropriate)    Problem: Wound, Traumatic, Nonburn (Adult)  Goal: Signs and Symptoms of Listed Potential Problems Will be Absent or Manageable (Wound, Traumatic, Nonburn)  Outcome: Ongoing (interventions implemented as appropriate)    Problem: Pressure Ulcer Risk (Chuy Scale) (Adult,Obstetrics,Pediatric)  Goal: Skin Integrity  Outcome: Ongoing (interventions implemented as appropriate)

## 2017-10-09 NOTE — PROGRESS NOTES
Continued Stay Note  JANIS Lisa     Patient Name: Pallavi Yee  MRN: [id number]  Today's Date: 10/9/2017    Admit Date: 10/4/2017          Discharge Plan       10/09/17 1546    Case Management/Social Work Plan    Plan Undetermined    Additional Comments Original discharge plan was return to group home. Patient is to have PEG placed tomorrow. SW will discuss SNF placement with patient's mother, Josefina.               Discharge Codes     None            HUI Hayes

## 2017-10-09 NOTE — PLAN OF CARE
Problem: Patient Care Overview (Adult)  Goal: Plan of Care Review  Outcome: Ongoing (interventions implemented as appropriate)    10/09/17 0622   Coping/Psychosocial Response Interventions   Plan Of Care Reviewed With patient   Patient Care Overview   Progress no change   Outcome Evaluation   Outcome Summary/Follow up Plan vss; alert and oriented to self; no c/o pain; bm this shift; straight cath x 1; nih = 17; continue to monitor       Goal: Adult Individualization and Mutuality  Outcome: Ongoing (interventions implemented as appropriate)  Goal: Discharge Needs Assessment  Outcome: Ongoing (interventions implemented as appropriate)    Problem: Stroke (Ischemic) (Adult)  Goal: Signs and Symptoms of Listed Potential Problems Will be Absent or Manageable (Stroke)  Outcome: Ongoing (interventions implemented as appropriate)    Problem: Fall Risk (Adult)  Goal: Absence of Falls  Outcome: Ongoing (interventions implemented as appropriate)    Problem: Wound, Traumatic, Nonburn (Adult)  Goal: Signs and Symptoms of Listed Potential Problems Will be Absent or Manageable (Wound, Traumatic, Nonburn)  Outcome: Ongoing (interventions implemented as appropriate)    Problem: Pressure Ulcer Risk (Chuy Scale) (Adult,Obstetrics,Pediatric)  Goal: Skin Integrity  Outcome: Ongoing (interventions implemented as appropriate)

## 2017-10-09 NOTE — PROGRESS NOTES
[facility] Medicine Services  INPATIENT PROGRESS NOTE    Length of Stay: 5  Date of Admission: 10/4/2017  Primary Care Physician: ROBERTH Beckham    Subjective   Chief Complaint: AMS, slurred speech, secretions  HPI   Patient secretions are better she is a little more coherent but unable to swallow GI has seen her plans are for PEG tube tomorrow.      Review of Systems     We will to obtain due to her current mental status    Objective    Temp:  [97.9 °F (36.6 °C)-99.5 °F (37.5 °C)] 98.4 °F (36.9 °C)  Heart Rate:  [] 107  Resp:  [14-20] 14  BP: (119-147)/(53-90) 147/64  Physical Exam   Constitutional:   Awake, unable to communicate, garbled speech, congested, secretions   HENT:   Head: Normocephalic and atraumatic.   Eyes: Conjunctivae and EOM are normal. Pupils are equal, round, and reactive to light.   Neck: Neck supple. No JVD present. No thyromegaly present.   Cardiovascular: Normal rate, regular rhythm, normal heart sounds and intact distal pulses.  Exam reveals no gallop and no friction rub.    No murmur heard.  Pulmonary/Chest: Effort normal and breath sounds normal. No respiratory distress. She has no wheezes. She has no rales. She exhibits no tenderness.   Abdominal: Soft. Bowel sounds are normal. She exhibits no distension. There is no tenderness. There is no rebound and no guarding.   Genitourinary:   Genitourinary Comments: Walsh in place   Musculoskeletal: Normal range of motion. She exhibits no edema, tenderness or deformity.   Lymphadenopathy:     She has no cervical adenopathy.   Neurological: She is alert. She displays normal reflexes. No cranial nerve deficit. She exhibits normal muscle tone.   Moving right arm, not answering questions or following commands, contractures of left extremities   Skin: Skin is warm and dry. No rash noted.           Results Review:  I have reviewed the labs, radiology results, and diagnostic studies.    Laboratory Data:      Results from last 7 days  Lab Units 10/09/17  0800 10/08/17  0427 10/07/17  0432   WBC 10*3/mm3 8.11 6.67 7.27   HEMOGLOBIN g/dL 11.6* 10.7* 11.1*   HEMATOCRIT % 34.8* 33.0* 34.5*   PLATELETS 10*3/mm3 176 174 170          Results from last 7 days  Lab Units 10/09/17  0800 10/08/17  0427 10/07/17  0432   SODIUM mmol/L 136 138 142   POTASSIUM mmol/L 3.9 3.7 3.5   CHLORIDE mmol/L 106 107 108   CO2 mmol/L 22.0* 24.0 24.0   BUN mg/dL 8 10 14   CREATININE mg/dL 0.45* 0.42* 0.46*   CALCIUM mg/dL 8.7 8.3* 8.6   GLUCOSE mg/dL 96 101* 109*       Culture Data:   Blood Culture   Date Value Ref Range Status   10/04/2017 No growth at less than 24 hours  Preliminary   10/04/2017 No growth at less than 24 hours  Preliminary     Respiratory Culture   Date Value Ref Range Status   10/04/2017 Scant growth (1+) Normal Respiratory Jackie  Preliminary   10/04/2017 Scant growth (1+) Yeast isolated (A)  Preliminary       Radiology Data:   Imaging Results (last 24 hours)     ** No results found for the last 24 hours. **          I have reviewed the patient current medications.     Assessment/Plan     Hospital Problem List     CVA (cerebral vascular accident)          1. CVA with abnormal left internal carotid CTA pending unsure if could get done  2. Concern of aspiration pna-no fever, normal white count. Received rocephin x 2 days at [facility]. Started on clinda here. Cultures pending. Suctioning for secretions.  Continue scopolamine patch to help with secretions.  Plan for PEG tube in a.m.  3. PT/OT/ST    4. Right rib fractures    5. Hx of TBI with significant underlying deficiencies              Discharge Planning: I expect the patient to be discharged to facility in 1-2 days.    Humberto Russell MD   10/09/17   12:44 PM

## 2017-10-09 NOTE — PLAN OF CARE
"Problem: Patient Care Overview (Adult)  Goal: Plan of Care Review  Outcome: Ongoing (interventions implemented as appropriate)    10/09/17 1303   Coping/Psychosocial Response Interventions   Plan Of Care Reviewed With patient   Patient Care Overview   Progress no change   Outcome Evaluation   Outcome Summary/Follow up Plan ST attempted to complete PO trials of ice chips as well as swallowing therapy. Upon entering the room, the pt was speaking in unintelligible jargon and began to yell at ST. ST attemped to help position the patient in her bed for trials, pt became angry and yelled. ST contacted PCA for assistance in positioning, PCA stated that the patient had been combative all day and had tried hitting her. ST asked pt if she would like to try some ice chips and she intellligibly yelled \"no\". Since pt refsued and was combative; ST left and did not attempt to encourage participation in therapy anymore at this time.            "

## 2017-10-09 NOTE — PROGRESS NOTES
Nutrition Services    Patient Name:  Pallavi Yee  YOB: 1980  MRN: [id number]  Admit Date:  10/4/2017    Pt has been NPO x 4-5 days. If diet is unable to be initiated, pt may benefit from alternate means of nutrition support. Please advise. RD will continue to follow. -Candice MEYER Dietetics    Electronically signed by:  Candice Duarte  10/09/17 7:57 AM

## 2017-10-10 ENCOUNTER — ANESTHESIA EVENT (OUTPATIENT)
Dept: GASTROENTEROLOGY | Facility: HOSPITAL | Age: 37
End: 2017-10-10

## 2017-10-10 ENCOUNTER — ANESTHESIA (OUTPATIENT)
Dept: GASTROENTEROLOGY | Facility: HOSPITAL | Age: 37
End: 2017-10-10

## 2017-10-10 PROBLEM — R13.12 OROPHARYNGEAL DYSPHAGIA: Status: ACTIVE | Noted: 2017-10-04

## 2017-10-10 LAB
ANION GAP SERPL CALCULATED.3IONS-SCNC: 10 MMOL/L (ref 4–13)
BACTERIA SPEC AEROBE CULT: NORMAL
BASOPHILS # BLD AUTO: 0.03 10*3/MM3 (ref 0–0.2)
BASOPHILS NFR BLD AUTO: 0.4 % (ref 0–2)
BUN BLD-MCNC: 7 MG/DL (ref 5–21)
BUN/CREAT SERPL: 14.9 (ref 7–25)
CALCIUM SPEC-SCNC: 8.7 MG/DL (ref 8.4–10.4)
CHLORIDE SERPL-SCNC: 105 MMOL/L (ref 98–110)
CO2 SERPL-SCNC: 23 MMOL/L (ref 24–31)
CREAT BLD-MCNC: 0.47 MG/DL (ref 0.5–1.4)
DEPRECATED RDW RBC AUTO: 51.5 FL (ref 40–54)
EOSINOPHIL # BLD AUTO: 0 10*3/MM3 (ref 0–0.7)
EOSINOPHIL NFR BLD AUTO: 0 % (ref 0–4)
ERYTHROCYTE [DISTWIDTH] IN BLOOD BY AUTOMATED COUNT: 15.1 % (ref 12–15)
GFR SERPL CREATININE-BSD FRML MDRD: 148 ML/MIN/1.73
GLUCOSE BLD-MCNC: 97 MG/DL (ref 70–100)
GRAM STN SPEC: NORMAL
HCT VFR BLD AUTO: 34.6 % (ref 37–47)
HGB BLD-MCNC: 11.3 G/DL (ref 12–16)
IMM GRANULOCYTES # BLD: 0.2 10*3/MM3 (ref 0–0.03)
IMM GRANULOCYTES NFR BLD: 2.9 % (ref 0–5)
LYMPHOCYTES # BLD AUTO: 1.38 10*3/MM3 (ref 0.72–4.86)
LYMPHOCYTES NFR BLD AUTO: 20.1 % (ref 15–45)
MCH RBC QN AUTO: 31 PG (ref 28–32)
MCHC RBC AUTO-ENTMCNC: 32.7 G/DL (ref 33–36)
MCV RBC AUTO: 94.8 FL (ref 82–98)
MONOCYTES # BLD AUTO: 0.94 10*3/MM3 (ref 0.19–1.3)
MONOCYTES NFR BLD AUTO: 13.7 % (ref 4–12)
NEUTROPHILS # BLD AUTO: 4.31 10*3/MM3 (ref 1.87–8.4)
NEUTROPHILS NFR BLD AUTO: 62.9 % (ref 39–78)
PLATELET # BLD AUTO: 196 10*3/MM3 (ref 130–400)
PMV BLD AUTO: 11.9 FL (ref 6–12)
POTASSIUM BLD-SCNC: 3.5 MMOL/L (ref 3.5–5.3)
RBC # BLD AUTO: 3.65 10*6/MM3 (ref 4.2–5.4)
SODIUM BLD-SCNC: 138 MMOL/L (ref 135–145)
WBC NRBC COR # BLD: 6.86 10*3/MM3 (ref 4.8–10.8)

## 2017-10-10 PROCEDURE — C1769 GUIDE WIRE: HCPCS | Performed by: INTERNAL MEDICINE

## 2017-10-10 PROCEDURE — 0DH63UZ INSERTION OF FEEDING DEVICE INTO STOMACH, PERCUTANEOUS APPROACH: ICD-10-PCS | Performed by: INTERNAL MEDICINE

## 2017-10-10 PROCEDURE — 25010000003 CEFAZOLIN PER 500 MG: Performed by: INTERNAL MEDICINE

## 2017-10-10 PROCEDURE — 43246 EGD PLACE GASTROSTOMY TUBE: CPT | Performed by: INTERNAL MEDICINE

## 2017-10-10 PROCEDURE — 85025 COMPLETE CBC W/AUTO DIFF WBC: CPT | Performed by: FAMILY MEDICINE

## 2017-10-10 PROCEDURE — 97110 THERAPEUTIC EXERCISES: CPT

## 2017-10-10 PROCEDURE — 25010000003 CEFAZOLIN IN D5W 1 GM/50ML SOLUTION: Performed by: INTERNAL MEDICINE

## 2017-10-10 PROCEDURE — 80048 BASIC METABOLIC PNL TOTAL CA: CPT | Performed by: FAMILY MEDICINE

## 2017-10-10 PROCEDURE — 25010000002 FUROSEMIDE PER 20 MG: Performed by: FAMILY MEDICINE

## 2017-10-10 PROCEDURE — 25010000002 PROPOFOL 10 MG/ML EMULSION: Performed by: NURSE ANESTHETIST, CERTIFIED REGISTERED

## 2017-10-10 DEVICE — BARD PEG SAFETY SYSTEM
Type: IMPLANTABLE DEVICE | Site: ABDOMEN | Status: FUNCTIONAL
Brand: BARD PEG SAFETY SYSTEM

## 2017-10-10 RX ORDER — LIDOCAINE HYDROCHLORIDE 20 MG/ML
INJECTION, SOLUTION INFILTRATION; PERINEURAL AS NEEDED
Status: DISCONTINUED | OUTPATIENT
Start: 2017-10-10 | End: 2017-10-10 | Stop reason: SURG

## 2017-10-10 RX ORDER — PROPOFOL 10 MG/ML
VIAL (ML) INTRAVENOUS AS NEEDED
Status: DISCONTINUED | OUTPATIENT
Start: 2017-10-10 | End: 2017-10-10 | Stop reason: SURG

## 2017-10-10 RX ORDER — SODIUM CHLORIDE 9 MG/ML
INJECTION, SOLUTION INTRAVENOUS CONTINUOUS PRN
Status: DISCONTINUED | OUTPATIENT
Start: 2017-10-10 | End: 2017-10-10 | Stop reason: SURG

## 2017-10-10 RX ORDER — SODIUM CHLORIDE 9 MG/ML
100 INJECTION, SOLUTION INTRAVENOUS CONTINUOUS
Status: DISCONTINUED | OUTPATIENT
Start: 2017-10-10 | End: 2017-10-12

## 2017-10-10 RX ORDER — SODIUM CHLORIDE 0.9 % (FLUSH) 0.9 %
1-10 SYRINGE (ML) INJECTION AS NEEDED
Status: DISCONTINUED | OUTPATIENT
Start: 2017-10-10 | End: 2017-10-10

## 2017-10-10 RX ADMIN — PROPOFOL 50 MG: 10 INJECTION, EMULSION INTRAVENOUS at 09:54

## 2017-10-10 RX ADMIN — SODIUM CHLORIDE: 9 INJECTION, SOLUTION INTRAVENOUS at 09:34

## 2017-10-10 RX ADMIN — CLINDAMYCIN PHOSPHATE 300 MG: 6 INJECTION, SOLUTION INTRAVENOUS at 12:20

## 2017-10-10 RX ADMIN — CEFAZOLIN SODIUM 1 G: 1 SOLUTION INTRAVENOUS at 09:50

## 2017-10-10 RX ADMIN — PROPOFOL 100 MG: 10 INJECTION, EMULSION INTRAVENOUS at 09:51

## 2017-10-10 RX ADMIN — PROPOFOL 50 MG: 10 INJECTION, EMULSION INTRAVENOUS at 10:02

## 2017-10-10 RX ADMIN — CLINDAMYCIN PHOSPHATE 300 MG: 6 INJECTION, SOLUTION INTRAVENOUS at 19:26

## 2017-10-10 RX ADMIN — PROPOFOL 50 MG: 10 INJECTION, EMULSION INTRAVENOUS at 09:56

## 2017-10-10 RX ADMIN — ASPIRIN 300 MG: 300 SUPPOSITORY RECTAL at 12:20

## 2017-10-10 RX ADMIN — CLINDAMYCIN PHOSPHATE 300 MG: 6 INJECTION, SOLUTION INTRAVENOUS at 02:31

## 2017-10-10 RX ADMIN — FUROSEMIDE 20 MG: 10 INJECTION, SOLUTION INTRAMUSCULAR; INTRAVENOUS at 12:20

## 2017-10-10 RX ADMIN — DEXTROSE MONOHYDRATE, SODIUM CHLORIDE, AND POTASSIUM CHLORIDE 100 ML/HR: 50; 4.5; 1.49 INJECTION, SOLUTION INTRAVENOUS at 15:43

## 2017-10-10 RX ADMIN — CEFAZOLIN SODIUM 1 G: 1 INJECTION, SOLUTION INTRAVENOUS at 16:08

## 2017-10-10 RX ADMIN — NICOTINE 1 PATCH: 21 PATCH, EXTENDED RELEASE TRANSDERMAL at 19:26

## 2017-10-10 RX ADMIN — LIDOCAINE HYDROCHLORIDE 20 MG: 20 INJECTION, SOLUTION INFILTRATION; PERINEURAL at 09:51

## 2017-10-10 NOTE — PLAN OF CARE
Problem: Patient Care Overview (Adult)  Goal: Plan of Care Review  Outcome: Ongoing (interventions implemented as appropriate)    10/10/17 0355   Coping/Psychosocial Response Interventions   Plan Of Care Reviewed With patient   Patient Care Overview   Progress no change   Outcome Evaluation   Outcome Summary/Follow up Plan No neuro changes, NIH=17, patient still confused with garbled speech. NPO with PEG placement today. VSS, Safety maintained. Will continue to monitor.         Problem: Stroke (Ischemic) (Adult)  Goal: Signs and Symptoms of Listed Potential Problems Will be Absent or Manageable (Stroke)  Outcome: Ongoing (interventions implemented as appropriate)    Problem: Fall Risk (Adult)  Goal: Absence of Falls  Outcome: Ongoing (interventions implemented as appropriate)    Problem: Wound, Traumatic, Nonburn (Adult)  Goal: Signs and Symptoms of Listed Potential Problems Will be Absent or Manageable (Wound, Traumatic, Nonburn)  Outcome: Ongoing (interventions implemented as appropriate)    Problem: Pressure Ulcer Risk (Chuy Scale) (Adult,Obstetrics,Pediatric)  Goal: Skin Integrity  Outcome: Ongoing (interventions implemented as appropriate)

## 2017-10-10 NOTE — PROGRESS NOTES
[facility] Medicine Services  INPATIENT PROGRESS NOTE    Length of Stay: 6  Date of Admission: 10/4/2017  Primary Care Physician: ROBERTH Beckham    Subjective   Chief Complaint: AMS, slurred speech, secretions  HPI   Patient has PEG tube. Continues to have upper respiratory congestion. Speech today, I am not able to understand her and there is no family present.       Review of Systems   Secretions  SP PEG  We will to obtain due to her current mental status    Objective    Temp:  [97.7 °F (36.5 °C)-99.5 °F (37.5 °C)] 97.7 °F (36.5 °C)  Heart Rate:  [75-95] 77  Resp:  [14-25] 22  BP: (107-154)/(52-89) 107/77  Physical Exam   Constitutional:   Awake, unable to communicate, garbled speech, congested, secretions   HENT:   Head: Normocephalic and atraumatic.   Eyes: Conjunctivae and EOM are normal. Pupils are equal, round, and reactive to light.   Neck: Neck supple. No JVD present. No thyromegaly present.   Cardiovascular: Normal rate, regular rhythm, normal heart sounds and intact distal pulses.  Exam reveals no gallop and no friction rub.    No murmur heard.  Pulmonary/Chest: Effort normal and breath sounds normal. No respiratory distress. She has no wheezes. She has no rales. She exhibits no tenderness.   Abdominal: Soft. Bowel sounds are normal. She exhibits no distension. There is no tenderness. There is no rebound and no guarding.   Genitourinary:   Genitourinary Comments: Walsh in place   Musculoskeletal: Normal range of motion. She exhibits no edema, tenderness or deformity.   Lymphadenopathy:     She has no cervical adenopathy.   Neurological: She is alert. She displays normal reflexes. No cranial nerve deficit. She exhibits normal muscle tone.   Moving right arm, not answering questions or following commands, contractures of left extremities   Skin: Skin is warm and dry. No rash noted.           Results Review:  I have reviewed the labs, radiology results, and diagnostic  studies.    Laboratory Data:     Results from last 7 days  Lab Units 10/10/17  0513 10/09/17  0800 10/08/17  0427   WBC 10*3/mm3 6.86 8.11 6.67   HEMOGLOBIN g/dL 11.3* 11.6* 10.7*   HEMATOCRIT % 34.6* 34.8* 33.0*   PLATELETS 10*3/mm3 196 176 174          Results from last 7 days  Lab Units 10/10/17  0513 10/09/17  0800 10/08/17  0427   SODIUM mmol/L 138 136 138   POTASSIUM mmol/L 3.5 3.9 3.7   CHLORIDE mmol/L 105 106 107   CO2 mmol/L 23.0* 22.0* 24.0   BUN mg/dL 7 8 10   CREATININE mg/dL 0.47* 0.45* 0.42*   CALCIUM mg/dL 8.7 8.7 8.3*   GLUCOSE mg/dL 97 96 101*       Culture Data:   Blood Culture   Date Value Ref Range Status   10/04/2017 No growth at less than 24 hours  Preliminary   10/04/2017 No growth at less than 24 hours  Preliminary     Respiratory Culture   Date Value Ref Range Status   10/04/2017 Scant growth (1+) Normal Respiratory Jackie  Preliminary   10/04/2017 Scant growth (1+) Yeast isolated (A)  Preliminary       Radiology Data:   Imaging Results (last 24 hours)     ** No results found for the last 24 hours. **          I have reviewed the patient current medications.     Assessment/Plan     Hospital Problem List     * (Principal)Oropharyngeal dysphagia    Overview Signed 10/10/2017  7:34 AM by Josefina Barry MD     Added automatically from request for surgery [id number]         CVA (cerebral vascular accident)          1. CVA with abnormal left internal carotid CTA pending unsure if could get done  2. Concern of aspiration pna-no fever, normal white count. Received rocephin x 2 days at [facility]. Started on clinda here. Cultures pending. Suctioning for secretions.  Continue scopolamine patch to help with secretions.  SP PEG placement  3. PT/OT/ST  4. Right rib fractures  5. Hx of TBI with significant underlying deficiencies        Discharge Planning: I expect the patient to be discharged to facility in 1-2 days.    Dylan Johnson, DO   10/10/17   5:03 PM

## 2017-10-10 NOTE — ANESTHESIA POSTPROCEDURE EVALUATION
Patient: Pallavi Yee    Procedure Summary     Date Anesthesia Start Anesthesia Stop Room / Location    10/10/17 0934 1012 [facility] ENDOSCOPY 2 / [facility] END[facility]       Procedure Diagnosis Surgeon Provider    ESOPHAGOGASTRODUODENOSCOPY WITH ANESTHESIA (N/A Esophagus); PERCUTANEOUS ENDOSCOPIC GASTROSTOMY TUBE INSERTION (N/A Abdomen) Oropharyngeal dysphagia  (Oropharyngeal dysphagia [R13.12]) MD Amanda Maciel CRNA          Anesthesia Type: general  Last vitals  BP        Temp        Pulse       Resp        SpO2          Post Anesthesia Care and Evaluation    Patient location during evaluation: PACU  Patient participation: complete - patient participated  Level of consciousness: awake and alert  Pain score: 1  Pain management: adequate  Airway patency: patent  Anesthetic complications: No anesthetic complications    Cardiovascular status: acceptable  Respiratory status: room air  Hydration status: acceptable
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
## 2017-10-10 NOTE — PLAN OF CARE
Problem: Patient Care Overview (Adult)  Goal: Plan of Care Review  Outcome: Ongoing (interventions implemented as appropriate)    10/10/17 1529   Coping/Psychosocial Response Interventions   Plan Of Care Reviewed With patient   Patient Care Overview   Progress no change   Outcome Evaluation   Outcome Summary/Follow up Plan Pt denies pain. Garbled speech continues and left side weakness. Fall precautions in place. PEG tube place today. Ma y start tube feedings tomorrow.         Problem: Stroke (Ischemic) (Adult)  Goal: Signs and Symptoms of Listed Potential Problems Will be Absent or Manageable (Stroke)  Outcome: Ongoing (interventions implemented as appropriate)    Problem: Fall Risk (Adult)  Goal: Absence of Falls  Outcome: Ongoing (interventions implemented as appropriate)    Problem: Wound, Traumatic, Nonburn (Adult)  Goal: Signs and Symptoms of Listed Potential Problems Will be Absent or Manageable (Wound, Traumatic, Nonburn)  Outcome: Ongoing (interventions implemented as appropriate)    Problem: Pressure Ulcer Risk (Chuy Scale) (Adult,Obstetrics,Pediatric)  Goal: Skin Integrity  Outcome: Ongoing (interventions implemented as appropriate)    Problem: GI Endoscopy (Adult)  Goal: Signs and Symptoms of Listed Potential Problems Will be Absent or Manageable (GI Endoscopy)  Outcome: Ongoing (interventions implemented as appropriate)

## 2017-10-10 NOTE — PROGRESS NOTES
Neurology Progress Note      Date of admission: 10/4/2017  5:24 PM  Date of visit: 10/9/2017    Chief Complaint:  Multiple strokes    Subjective     Subjective:    Patient currently resting quietly  Medications:  Current Facility-Administered Medications   Medication Dose Route Frequency Provider Last Rate Last Dose   • acetaminophen (TYLENOL) suppository 650 mg  650 mg Rectal Q4H PRN Latrice Guillen MD   650 mg at 10/05/17 1416   • aspirin chewable tablet 81 mg  81 mg Oral Daily Albert Kraft MD        Or   • aspirin suppository 300 mg  300 mg Rectal Daily Albert Kraft MD   300 mg at 10/09/17 0909   • bisacodyl (DULCOLAX) suppository 10 mg  10 mg Rectal Daily PRN Humberto Russell MD       • clindamycin (CLEOCIN) IVPB 300 mg  300 mg Intravenous Q8H Latrice Guillen MD 0 mL/hr at 10/09/17 1422 300 mg at 10/09/17 1808   • dextrose 5 % and sodium chloride 0.45 % with KCl 20 mEq/L infusion  100 mL/hr Intravenous Continuous Latrice Guillen  mL/hr at 10/09/17 1422 100 mL/hr at 10/09/17 1422   • enoxaparin (LOVENOX) syringe 40 mg  40 mg Subcutaneous Daily Latrice Guillen MD   40 mg at 10/09/17 0909   • furosemide (LASIX) injection 20 mg  20 mg Intravenous Daily Latrice Guillen MD   20 mg at 10/09/17 0909   • HYDROmorphone (DILAUDID) injection 0.5 mg  0.5 mg Intravenous Q2H PRN Humberto Russell MD   0.5 mg at 10/08/17 0815   • iopamidol (ISOVUE-370) 76 % injection 150 mL  150 mL Intravenous Once in imaging Humberto Russell MD       • labetalol (NORMODYNE,TRANDATE) injection 10 mg  10 mg Intravenous Q10 Min PRN Albert Kraft MD       • nicotine (NICODERM CQ) 21 MG/24HR patch 1 patch  1 patch Transdermal Q24H Humberto Russell MD   1 patch at 10/09/17 1815   • ondansetron (ZOFRAN) injection 4 mg  4 mg Intravenous Q6H PRN Albert Kraft MD       • Scopolamine (TRANSDERM-SCOP) 1.5 MG/3DAYS patch 2 patch  2 patch Transdermal Q72H Humberto Russell MD   2 patch at 10/09/17 1810   • sodium  chloride 0.9 % flush 1-10 mL  1-10 mL Intravenous PRN Latrice Guillen MD       • sodium chloride 0.9 % flush 1-10 mL  1-10 mL Intravenous PRN Albert Kraft MD           Review of Systems:   -A 14 point review of systems is unobtainable        Objective      Vital Signs  Temp:  [98.3 °F (36.8 °C)-99.5 °F (37.5 °C)] 98.4 °F (36.9 °C)  Heart Rate:  [] 107  Resp:  [14-20] 14  BP: (119-147)/(53-87) 147/64    Physical Exam:    HEENT:  Neck supple  CVS:  Regular rate and rhythm.  No murmurs  Carotid Examination:  No bruits  Lungs:  Clear to auscultation  Abdomen:  Non-tender, Non-distended  Extremities:  No signs of peripheral edema    Neurologic Exam:    -Resting quietly  Has left sided weakness.   Coordination/Gait:  -No ataxia     Results Review:    I reviewed the patient's new clinical results.    Lab Results (last 24 hours)     Procedure Component Value Units Date/Time    Blood Culture With ALECIA - Blood, [[id number]]  (Normal) Collected:  10/04/17 1930    Specimen:  Blood from Arm, Left Updated:  10/08/17 2016     Blood Culture No growth at 4 days    Blood Culture With ALECIA - Blood, [[id number]]  (Normal) Collected:  10/04/17 1950    Specimen:  Blood from Hand, Left Updated:  10/08/17 2016     Blood Culture No growth at 4 days    Wound Culture - Wound, Arm, Right [[id number]]  (Normal) Collected:  10/05/17 0000    Specimen:  Wound from Arm, Right Updated:  10/09/17 0724     Wound Culture No growth at 4 days     Gram Stain Result No WBCs or organisms seen    CBC & Differential [[id number]] Collected:  10/09/17 0800    Specimen:  Blood Updated:  10/09/17 0823    Narrative:       The following orders were created for panel order CBC & Differential.  Procedure                               Abnormality         Status                     ---------                               -----------         ------                     CBC Auto Differential[[id number]]        Abnormal            Final result                 Please  "view results for these tests on the individual orders.    CBC Auto Differential [[id number]]  (Abnormal) Collected:  10/09/17 0800    Specimen:  Blood Updated:  10/09/17 0823     WBC 8.11 10*3/mm3      RBC 3.68 (L) 10*6/mm3      Hemoglobin 11.6 (L) g/dL      Hematocrit 34.8 (L) %      MCV 94.6 fL      MCH 31.5 pg      MCHC 33.3 g/dL      RDW 14.8 %      RDW-SD 50.1 fl      MPV 12.1 (H) fL      Platelets 176 10*3/mm3      Neutrophil % 69.6 %      Lymphocyte % 16.6 %      Monocyte % 12.1 (H) %      Eosinophil % 0.0 %      Basophil % 0.2 %      Immature Grans % 1.5 %      Neutrophils, Absolute 5.64 10*3/mm3      Lymphocytes, Absolute 1.35 10*3/mm3      Monocytes, Absolute 0.98 10*3/mm3      Eosinophils, Absolute 0.00 10*3/mm3      Basophils, Absolute 0.02 10*3/mm3      Immature Grans, Absolute 0.12 (H) 10*3/mm3     Basic Metabolic Panel [[id number]]  (Abnormal) Collected:  10/09/17 0800    Specimen:  Blood Updated:  10/09/17 0836     Glucose 96 mg/dL      BUN 8 mg/dL      Creatinine 0.45 (L) mg/dL      Sodium 136 mmol/L      Potassium 3.9 mmol/L      Chloride 106 mmol/L      CO2 22.0 (L) mmol/L      Calcium 8.7 mg/dL      eGFR Non African Amer >150 mL/min/1.73      BUN/Creatinine Ratio 17.8     Anion Gap 8.0 mmol/L     Narrative:       GFR Normal >60  Chronic Kidney Disease <60  Kidney Failure <15        Imaging Results (last 24 hours)     ** No results found for the last 24 hours. **      Carotid dopplers:   \"Impression:  1. There is less than 50% stenosis of the right internal carotid artery.  2. There is 50-69% stenosis of the left internal carotid artery.  3. Antegrade flow is demonstrated in the right vertebral. The left vertebral was not visualized\"  Assessment/Plan     Hospital Problem List    Active Problems:    CVA (cerebral vascular accident)    Impression:  1. Multiple strokes in the left subcortical region--right posterior IC and right posterior midbrain and right monika.  2. Abnormal carotid duplex with 50 to " 60 % stenosis in the left ICA and left vertebral not visualized.   3. Dysphagia  4. Dysarthria  5. S/P traumatic brain injury    Plan:   CTA of neck  was cancelled but ideally this should be done if possible. However, she would likely need to be sedated and more than likely would need anesthesia Spoke with mother about this.   Spoke with mother Yesika by phone and answered questions.   NPO  Aspirin  Planning PEG  Will need smoking cessation counseling.        Brenda Gan MD  10/09/17  7:38 PM

## 2017-10-10 NOTE — PLAN OF CARE
Problem: Patient Care Overview (Adult)  Goal: Plan of Care Review  Outcome: Ongoing (interventions implemented as appropriate)    10/10/17 0957   Coping/Psychosocial Response Interventions   Plan Of Care Reviewed With patient   Patient Care Overview   Progress no change   Outcome Evaluation   Outcome Summary/Follow up Plan tolerating well         Problem: GI Endoscopy (Adult)  Goal: Signs and Symptoms of Listed Potential Problems Will be Absent or Manageable (GI Endoscopy)  Outcome: Ongoing (interventions implemented as appropriate)

## 2017-10-10 NOTE — PLAN OF CARE
Problem: Patient Care Overview (Adult)  Goal: Plan of Care Review  Outcome: Ongoing (interventions implemented as appropriate)    10/10/17 0906   Patient Care Overview   Progress no change   Outcome Evaluation   Outcome Summary/Follow up Plan Pt remains NPO, PEG placement today. TF Order placed to start on 10/11/17 s/p peg tube protocol. Will continue to follow.

## 2017-10-10 NOTE — ANESTHESIA PREPROCEDURE EVALUATION
Anesthesia Evaluation     Patient summary reviewed and Nursing notes reviewed   NPO Solid Status: > 8 hours  NPO Liquid Status: > 8 hours     Airway   Mallampati: I  TM distance: >3 FB  Neck ROM: full  no difficulty expected  Dental - normal exam     Pulmonary - normal exam   (+) pneumonia improving ,     ROS comment: Right rib fractures  Cardiovascular - normal exam  Exercise tolerance: poor (<4 METS)        Neuro/Psych  (+) CVA, psychiatric history Depression,      ROS Comment: H/o TBI age 16, with chronic left sided weakness and lives at assisted living. Brought to [facility] for decreased responsiveness and worsening left sided weakness. Found to have possible acute stroke on MRI  GI/Hepatic/Renal/Endo - negative ROS     Musculoskeletal (-) negative ROS        ROS comment: Lupus  Abdominal  - normal exam   Substance History - negative use     OB/GYN negative ob/gyn ROS         Other - negative ROS                                     Anesthesia Plan    ASA 3     general     intravenous induction   Anesthetic plan and risks discussed with patient.

## 2017-10-10 NOTE — PROGRESS NOTES
Continued Stay Note   Maria L     Patient Name: Pallavi Yee  MRN: [id number]  Today's Date: 10/10/2017    Admit Date: 10/4/2017          Discharge Plan       10/10/17 1203    Case Management/Social Work Plan    Plan SNF Placement    Additional Comments Patient's mother has requested placement at [facility] in [place], [place]. Patient has a  through [facility] by the name of Merlyn Burden. JIHAN has spoken to Merlyn and Merlyn states that for this particular patient, referral to SNF will have to come from [facility]. Merlyn states that referral must be made to their office and arrangements will be made by Amaris Matias. JIHAN faxed referral to Amaris Nugent at [phone]. JIHAN will follow for approval for placement at [facility].               Discharge Codes     None            HUI Hayes

## 2017-10-10 NOTE — PLAN OF CARE
Problem: Patient Care Overview (Adult)  Goal: Plan of Care Review  Outcome: Ongoing (interventions implemented as appropriate)    10/10/17 1124   Coping/Psychosocial Response Interventions   Plan Of Care Reviewed With patient   Patient Care Overview   Progress progress toward functional goals is gradual   Outcome Evaluation   Outcome Summary/Follow up Plan Pt. participates prn with vc's with Mathieu Davila slow stretch to decrease tone!

## 2017-10-10 NOTE — ANESTHESIA POSTPROCEDURE EVALUATION
"Patient: Pallavi Yee    Procedure Summary     Date Anesthesia Start Anesthesia Stop Room / Location    10/10/17 0934 1012 [facility] ENDOSCOPY 2 / [facility] ENDOSCOPY       Procedure Diagnosis Surgeon Provider    ESOPHAGOGASTRODUODENOSCOPY WITH ANESTHESIA (N/A Esophagus); PERCUTANEOUS ENDOSCOPIC GASTROSTOMY TUBE INSERTION (N/A Abdomen) Oropharyngeal dysphagia  (Oropharyngeal dysphagia [R13.12]) MD Amanda Maciel, CRNA          Anesthesia Type: general  Last vitals  BP        Temp        Pulse       Resp        SpO2          Post Anesthesia Care and Evaluation      Comments: Blood pressure 112/64, pulse 95, temperature 98.1 °F (36.7 °C), temperature source Axillary, resp. rate 21, height 67\" (170.2 cm), weight 146 lb 4 oz (66.3 kg), SpO2 95 %.        "

## 2017-10-11 LAB
ANION GAP SERPL CALCULATED.3IONS-SCNC: 12 MMOL/L (ref 4–13)
BASOPHILS # BLD AUTO: 0.04 10*3/MM3 (ref 0–0.2)
BASOPHILS NFR BLD AUTO: 0.4 % (ref 0–2)
BUN BLD-MCNC: 9 MG/DL (ref 5–21)
BUN/CREAT SERPL: 16.7 (ref 7–25)
CALCIUM SPEC-SCNC: 8.8 MG/DL (ref 8.4–10.4)
CHLORIDE SERPL-SCNC: 105 MMOL/L (ref 98–110)
CO2 SERPL-SCNC: 24 MMOL/L (ref 24–31)
CREAT BLD-MCNC: 0.54 MG/DL (ref 0.5–1.4)
DEPRECATED RDW RBC AUTO: 52.3 FL (ref 40–54)
EOSINOPHIL # BLD AUTO: 0 10*3/MM3 (ref 0–0.7)
EOSINOPHIL NFR BLD AUTO: 0 % (ref 0–4)
ERYTHROCYTE [DISTWIDTH] IN BLOOD BY AUTOMATED COUNT: 15.2 % (ref 12–15)
GFR SERPL CREATININE-BSD FRML MDRD: 126 ML/MIN/1.73
GLUCOSE BLD-MCNC: 112 MG/DL (ref 70–100)
HCT VFR BLD AUTO: 36.5 % (ref 37–47)
HGB BLD-MCNC: 11.9 G/DL (ref 12–16)
IMM GRANULOCYTES # BLD: 0.25 10*3/MM3 (ref 0–0.03)
IMM GRANULOCYTES NFR BLD: 2.7 % (ref 0–5)
LYMPHOCYTES # BLD AUTO: 1.42 10*3/MM3 (ref 0.72–4.86)
LYMPHOCYTES NFR BLD AUTO: 15.1 % (ref 15–45)
MCH RBC QN AUTO: 31 PG (ref 28–32)
MCHC RBC AUTO-ENTMCNC: 32.6 G/DL (ref 33–36)
MCV RBC AUTO: 95.1 FL (ref 82–98)
MONOCYTES # BLD AUTO: 1.05 10*3/MM3 (ref 0.19–1.3)
MONOCYTES NFR BLD AUTO: 11.2 % (ref 4–12)
NEUTROPHILS # BLD AUTO: 6.64 10*3/MM3 (ref 1.87–8.4)
NEUTROPHILS NFR BLD AUTO: 70.6 % (ref 39–78)
NRBC BLD MANUAL-RTO: 0 /100 WBC (ref 0–0)
PLATELET # BLD AUTO: 204 10*3/MM3 (ref 130–400)
PMV BLD AUTO: 11.8 FL (ref 6–12)
POTASSIUM BLD-SCNC: 3.6 MMOL/L (ref 3.5–5.3)
RBC # BLD AUTO: 3.84 10*6/MM3 (ref 4.2–5.4)
SODIUM BLD-SCNC: 141 MMOL/L (ref 135–145)
WBC NRBC COR # BLD: 9.4 10*3/MM3 (ref 4.8–10.8)

## 2017-10-11 PROCEDURE — 25010000002 HYDROMORPHONE PER 4 MG: Performed by: INTERNAL MEDICINE

## 2017-10-11 PROCEDURE — 99232 SBSQ HOSP IP/OBS MODERATE 35: CPT | Performed by: INTERNAL MEDICINE

## 2017-10-11 PROCEDURE — 85025 COMPLETE CBC W/AUTO DIFF WBC: CPT | Performed by: FAMILY MEDICINE

## 2017-10-11 PROCEDURE — 25010000002 ENOXAPARIN PER 10 MG: Performed by: FAMILY MEDICINE

## 2017-10-11 PROCEDURE — 97110 THERAPEUTIC EXERCISES: CPT | Performed by: OCCUPATIONAL THERAPIST

## 2017-10-11 PROCEDURE — 92526 ORAL FUNCTION THERAPY: CPT | Performed by: SPEECH-LANGUAGE PATHOLOGIST

## 2017-10-11 PROCEDURE — 63710000001 PREDNISONE PER 5 MG: Performed by: INTERNAL MEDICINE

## 2017-10-11 PROCEDURE — 97535 SELF CARE MNGMENT TRAINING: CPT | Performed by: OCCUPATIONAL THERAPIST

## 2017-10-11 PROCEDURE — 80048 BASIC METABOLIC PNL TOTAL CA: CPT | Performed by: FAMILY MEDICINE

## 2017-10-11 PROCEDURE — 25010000002 FUROSEMIDE PER 20 MG: Performed by: FAMILY MEDICINE

## 2017-10-11 RX ORDER — FUROSEMIDE 20 MG/1
20 TABLET ORAL DAILY
Status: DISCONTINUED | OUTPATIENT
Start: 2017-10-11 | End: 2017-10-19 | Stop reason: HOSPADM

## 2017-10-11 RX ORDER — BACLOFEN 10 MG/1
20 TABLET ORAL DAILY
Status: DISCONTINUED | OUTPATIENT
Start: 2017-10-11 | End: 2017-10-12

## 2017-10-11 RX ORDER — OXYBUTYNIN CHLORIDE 5 MG/1
5 TABLET, EXTENDED RELEASE ORAL DAILY
Status: DISCONTINUED | OUTPATIENT
Start: 2017-10-11 | End: 2017-10-19 | Stop reason: HOSPADM

## 2017-10-11 RX ORDER — FLUOXETINE 10 MG/1
10 CAPSULE ORAL DAILY
Status: DISCONTINUED | OUTPATIENT
Start: 2017-10-11 | End: 2017-10-19 | Stop reason: HOSPADM

## 2017-10-11 RX ORDER — POLYVINYL ALCOHOL 14 MG/ML
2 SOLUTION/ DROPS OPHTHALMIC NIGHTLY
Status: DISCONTINUED | OUTPATIENT
Start: 2017-10-11 | End: 2017-10-19 | Stop reason: HOSPADM

## 2017-10-11 RX ORDER — GABAPENTIN 300 MG/1
600 CAPSULE ORAL EVERY 12 HOURS SCHEDULED
Status: DISCONTINUED | OUTPATIENT
Start: 2017-10-11 | End: 2017-10-12

## 2017-10-11 RX ORDER — PREDNISONE 1 MG/1
3 TABLET ORAL DAILY
Status: DISCONTINUED | OUTPATIENT
Start: 2017-10-11 | End: 2017-10-19 | Stop reason: HOSPADM

## 2017-10-11 RX ORDER — LORAZEPAM 1 MG/1
2 TABLET ORAL NIGHTLY
Status: DISCONTINUED | OUTPATIENT
Start: 2017-10-11 | End: 2017-10-14

## 2017-10-11 RX ORDER — SENNOSIDES 8.6 MG
2 TABLET ORAL DAILY PRN
Status: DISCONTINUED | OUTPATIENT
Start: 2017-10-11 | End: 2017-10-19 | Stop reason: HOSPADM

## 2017-10-11 RX ORDER — NYSTATIN 100000 [USP'U]/G
POWDER TOPICAL EVERY 12 HOURS SCHEDULED
Status: DISCONTINUED | OUTPATIENT
Start: 2017-10-11 | End: 2017-10-19 | Stop reason: HOSPADM

## 2017-10-11 RX ORDER — DANTROLENE SODIUM 25 MG/1
25 CAPSULE ORAL 3 TIMES DAILY
Status: DISCONTINUED | OUTPATIENT
Start: 2017-10-11 | End: 2017-10-19 | Stop reason: HOSPADM

## 2017-10-11 RX ADMIN — NYSTATIN: 100000 POWDER TOPICAL at 22:24

## 2017-10-11 RX ADMIN — CLINDAMYCIN PHOSPHATE 300 MG: 6 INJECTION, SOLUTION INTRAVENOUS at 12:00

## 2017-10-11 RX ADMIN — PREDNISONE 3 MG: 1 TABLET ORAL at 17:56

## 2017-10-11 RX ADMIN — DANTROLENE SODIUM 25 MG: 25 CAPSULE ORAL at 17:56

## 2017-10-11 RX ADMIN — OXYBUTYNIN CHLORIDE 5 MG: 5 TABLET, FILM COATED, EXTENDED RELEASE ORAL at 17:56

## 2017-10-11 RX ADMIN — BACLOFEN 20 MG: 10 TABLET ORAL at 17:55

## 2017-10-11 RX ADMIN — GABAPENTIN 600 MG: 300 CAPSULE ORAL at 22:23

## 2017-10-11 RX ADMIN — HYDROMORPHONE HYDROCHLORIDE 0.5 MG: 1 INJECTION, SOLUTION INTRAMUSCULAR; INTRAVENOUS; SUBCUTANEOUS at 07:51

## 2017-10-11 RX ADMIN — Medication 81 MG: at 08:36

## 2017-10-11 RX ADMIN — NICOTINE 1 PATCH: 21 PATCH, EXTENDED RELEASE TRANSDERMAL at 18:19

## 2017-10-11 RX ADMIN — CLINDAMYCIN PHOSPHATE 300 MG: 6 INJECTION, SOLUTION INTRAVENOUS at 17:54

## 2017-10-11 RX ADMIN — FLUOXETINE 10 MG: 10 CAPSULE ORAL at 17:55

## 2017-10-11 RX ADMIN — LORAZEPAM 2 MG: 1 TABLET ORAL at 22:24

## 2017-10-11 RX ADMIN — FUROSEMIDE 20 MG: 10 INJECTION, SOLUTION INTRAMUSCULAR; INTRAVENOUS at 08:36

## 2017-10-11 RX ADMIN — DANTROLENE SODIUM 25 MG: 25 CAPSULE ORAL at 22:23

## 2017-10-11 RX ADMIN — CLINDAMYCIN PHOSPHATE 300 MG: 6 INJECTION, SOLUTION INTRAVENOUS at 03:30

## 2017-10-11 RX ADMIN — ENOXAPARIN SODIUM 40 MG: 40 INJECTION SUBCUTANEOUS at 08:40

## 2017-10-11 RX ADMIN — SODIUM CHLORIDE 100 ML/HR: 9 INJECTION, SOLUTION INTRAVENOUS at 08:30

## 2017-10-11 RX ADMIN — POLYVINYL ALCOHOL 2 DROP: 14 SOLUTION/ DROPS OPHTHALMIC at 22:24

## 2017-10-11 NOTE — THERAPY TREATMENT NOTE
Acute Care - Speech Language Pathology   Swallow Treatment Note [facility]     Patient Name: Pallavi Yee  : 1980  MRN: [id number]  Today's Date: 10/11/2017  Onset of Illness/Injury or Date of Surgery Date: 10/04/17     Referring Physician: Emelina      Admit Date: 10/4/2017  Patient agitated, asking for Coke. Explained why patient could not have Coke and attempted to give ice chip but patient hit at SLP and knocked spoon away. Discontinued therapy for day due to patient's behavior.  Malika Leong MS CCC-SLP 10/11/2017 12:55 PM    Visit Dx:      ICD-10-CM ICD-9-CM   1. Oropharyngeal dysphagia R13.12 787.22   2. Mobility impaired Z74.09 799.89   3. Impaired mobility and ADLs Z74.09 799.89     Patient Active Problem List   Diagnosis   • CVA (cerebral vascular accident)   • Oropharyngeal dysphagia             Adult Rehabilitation Note       10/11/17 1000 10/10/17 0802       Rehab Assessment/Intervention    Discipline speech language pathologist  -KW occupational therapy assistant  -CJ     Document Type therapy note (daily note)  -KW therapy note (daily note)  -CJ     Subjective Information  no complaints;agree to therapy  -CJ     Patient Effort, Rehab Treatment poor  -KW fair  -CJ     Precautions/Limitations  fall precautions  -CJ     Recorded by [KW] Malika Leong MS CCC-SLP [CJ] KAREEM Menendez/L     Pain Assessment    Pain Assessment  Peterson-Cuevas FACES  -CJ     Peterson-Cuevas FACES Pain Rating  0  -CJ     Recorded by  [CJ] KAREEM Menendez/YEN     Dysphagia/Swallow Intervention    Dysphagia/Swallow Therapeutic Feed Patient agitated, asking for Coke.  Explained why patient could not have Coke and attempted to give ice chip but patient hit at SLP and knocked spoon away.  Discontinued therapy for day due to patient's behavior.  -KW      Recorded by [KW] Malika Leong MS CCC-SLP      ROM (Range of Motion)    General ROM  --   prom Lue to decrease tone! Rue aarom!  -CJ     Recorded by  [CJ] Antwan PLUNKETT  KAREEM Hobbs/L     Bed Mobility, Assessment/Treatment    Bed Mobility, Comment  supine in bed!  -CJ     Recorded by  [CJ] MEHNAZ Menendez     Positioning and Restraints    Pre-Treatment Position  in bed  -CJ     Post Treatment Position  bed  -CJ     In Bed  supine;call light within reach;encouraged to call for assist;exit alarm on;side rails up x2  -CJ     Recorded by  [CJ] KAREEM Menendez/L       User Key  (r) = Recorded By, (t) = Taken By, (c) = Cosigned By    Initials Name Effective Dates     KAREEM Menendez/L 08/02/16 -     KW Malika Leong, MS CCC-SLP 08/02/16 -                   IP SLP Goals       10/11/17 1253 10/06/17 1135 10/05/17 0910    Begin to Take Some PO Safely    Begin to Take Some PO Safely- SLP, Date Established   10/05/17  -BN    Begin to Take Some PO Safely- SLP, Time to Achieve   by discharge  -BN    Begin to Take Some PO Safely- SLP, Activity Level   Patient will improve oral skills for more efficient eating;Patient will improve timing of pharyngeal response for safer, more efficient swallow  -BN    Begin to Take Some PO Safely- SLP, Additional Goal   Pt will tolerate PO trials with SLP without s/s of aspiration  -BN    Begin to Take Some PO Safely- SLP, Date Goal Reviewed 10/11/17  -KW 10/06/17  -TM 10/05/17  -BN    Begin to Take Some PO Safely- SLP, Outcome   goal ongoing  -BN      User Key  (r) = Recorded By, (t) = Taken By, (c) = Cosigned By    Initials Name Provider Type    TM Fatuma Leger, CCC-SLP Speech and Language Pathologist    KW Malika Leong, MS CCC-SLP Speech and Language Pathologist    JOHN Aguirre, CCC-SLP Speech and Language Pathologist          EDUCATION  The patient has been educated in the following areas:   Dysphagia (Swallowing Impairment).    SLP Recommendation and Plan                                           Plan of Care Review  Plan Of Care Reviewed With: patient  Progress: no change  Outcome Summary/Follow up Plan: Patient  agitated, asking for Coke. Explained why patient could not have Coke and attempted to give ice chip but patient hit at SLP and knocked spoon away. Discontinued therapy for day due to patient's behavior.           Time Calculation:         Time Calculation- SLP       10/11/17 1254          Time Calculation- SLP    SLP Start Time 1000  -KW      SLP Stop Time 1015  -KW      SLP Time Calculation (min) 15 min  -KW      SLP Received On 10/11/17  -KW        User Key  (r) = Recorded By, (t) = Taken By, (c) = Cosigned By    Initials Name Provider Type    APOLINAR Leong MS CCC-SLP Speech and Language Pathologist          Therapy Charges for Today     Code Description Service Date Service Provider Modifiers Qty    [id number] HC ST TREATMENT SWALLOW 1 10/11/2017 Malika Leong MS CCC-SLP GN, KX 1          SLP G-Codes  SLP NOMS Used?: Yes  Functional Limitations: Swallowing  Swallow Current Status (): At least 80 percent but less than 100 percent impaired, limited or restricted  Swallow Goal Status (): At least 60 percent but less than 80 percent impaired, limited or restricted      Malika Leong MS CCC-ADELINE  10/11/2017

## 2017-10-11 NOTE — PLAN OF CARE
Problem: Patient Care Overview (Adult)  Goal: Plan of Care Review  Outcome: Ongoing (interventions implemented as appropriate)    10/11/17 5604   Coping/Psychosocial Response Interventions   Plan Of Care Reviewed With patient   Patient Care Overview   Progress progress toward functional goals is gradual   Outcome Evaluation   Outcome Summary/Follow up Plan OT tx completed. Pt was max/dep x2 for rolling. Pt dep x2 for toilet hygiene. Pt completed PROM to LUE. Cont OT POC.

## 2017-10-11 NOTE — PROGRESS NOTES
[facility]  Inpatient Progress Note  Today's date:  10/11/17    Pallavi Avinaibuhr  1980       Reason for Follow Up:  PEG tube placement    Subjective:   The patient is status post day 1 EGD performed on 10/10/17 for PEG tube placement due to impaired swallowing and need for nutrition without difficulty.  The patient is not very arousable at this time.  I have spoken with nursing and they state she has tolerated her tube feedings well since placement.  Very little residual.  They tell me they have not had any melena or hematochezia noted in stools.    Allergies   Allergen Reactions   • Adhesive Tape      Blisters     • Latex    • Meperidine    • Other      Band aids   • Vancomycin      Red man syndrome   • Morphine Rash       Current Facility-Administered Medications:   •  acetaminophen (TYLENOL) suppository 650 mg, 650 mg, Rectal, Q4H PRN, Latrice Guillen MD, 650 mg at 10/05/17 1416  •  aspirin chewable tablet 81 mg, 81 mg, Oral, Daily, 81 mg at 10/11/17 0836 **OR** aspirin suppository 300 mg, 300 mg, Rectal, Daily, Albert Kraft MD, 300 mg at 10/10/17 1220  •  bisacodyl (DULCOLAX) suppository 10 mg, 10 mg, Rectal, Daily PRN, Humberto Russell MD  •  clindamycin (CLEOCIN) IVPB 300 mg, 300 mg, Intravenous, Q8H, Latrice Guillen MD, Last Rate: 0 mL/hr at 10/09/17 1422, 300 mg at 10/11/17 1200  •  dextrose 5 % and sodium chloride 0.45 % with KCl 20 mEq/L infusion, 100 mL/hr, Intravenous, Continuous, Latrice Guillen MD, Stopped at 10/11/17 0825  •  enoxaparin (LOVENOX) syringe 40 mg, 40 mg, Subcutaneous, Daily, Latrice Guillen MD, 40 mg at 10/11/17 0840  •  furosemide (LASIX) injection 20 mg, 20 mg, Intravenous, Daily, Latrice Guillen MD, 20 mg at 10/11/17 0836  •  HYDROmorphone (DILAUDID) injection 0.5 mg, 0.5 mg, Intravenous, Q2H PRN, Humberto Russell MD, 0.5 mg at 10/08/17 0815  •  iopamidol (ISOVUE-370) 76 % injection 150 mL, 150 mL, Intravenous, Once in imaging, Humberto  KAILEE Russell MD  •  labetalol (NORMODYNE,TRANDATE) injection 10 mg, 10 mg, Intravenous, Q10 Min PRN, Albert Kraft MD  •  nicotine (NICODERM CQ) 21 MG/24HR patch 1 patch, 1 patch, Transdermal, Q24H, Humberto Russell MD, 1 patch at 10/10/17 1926  •  ondansetron (ZOFRAN) injection 4 mg, 4 mg, Intravenous, Q6H PRN, Albert Kraft MD  •  Scopolamine (TRANSDERM-SCOP) 1.5 MG/3DAYS patch 2 patch, 2 patch, Transdermal, Q72H, Humberto Russell MD, 2 patch at 10/09/17 1810  •  sodium chloride 0.9 % flush 1-10 mL, 1-10 mL, Intravenous, PRN, Latrice Guillen MD  •  sodium chloride 0.9 % flush 1-10 mL, 1-10 mL, Intravenous, PRN, Albert Kraft MD  •  sodium chloride 0.9 % infusion, 100 mL/hr, Intravenous, Continuous, Aamir Khalil CRNA, Last Rate: 100 mL/hr at 10/11/17 0830, 100 mL/hr at 10/11/17 0830    Review of Systems:   Review of Systems   Unable to perform ROS: Mental status change        Vital Signs:  Temp:  [97.7 °F (36.5 °C)-98.5 °F (36.9 °C)] 98.5 °F (36.9 °C)  Heart Rate:  [] 112  Resp:  [16-22] 16  BP: (107-166)/(75-80) 166/80  Body mass index is 25.47 kg/(m^2).     Intake/Output Summary (Last 24 hours) at 10/11/17 1557  Last data filed at 10/10/17 1926   Gross per 24 hour   Intake               50 ml   Output                0 ml   Net               50 ml        Physical Exam:  Physical Exam   Constitutional: She appears well-developed.   Cardiovascular: Normal rate and regular rhythm.    Pulmonary/Chest: Effort normal.   Abdominal: Soft. Bowel sounds are normal.   PEG tube in place without signs or symptoms of erythema or infection.  Tube feedings currently in progress   Neurological:   Does not arouse well.  Very somnolent area did nursing does state that patient seems to understand most commands however when she is awake.        Results Review:   I have reviewed all of the patient's current test results      Results from last 7 days  Lab Units 10/11/17  0722 10/10/17  0513 10/09/17  0800   WBC  10*3/mm3 9.40 6.86 8.11   HEMOGLOBIN g/dL 11.9* 11.3* 11.6*   HEMATOCRIT % 36.5* 34.6* 34.8*   PLATELETS 10*3/mm3 204 196 176         Results from last 7 days  Lab Units 10/11/17  0722 10/10/17  0513 10/09/17  0800   SODIUM mmol/L 141 138 136   POTASSIUM mmol/L 3.6 3.5 3.9   CHLORIDE mmol/L 105 105 106   CO2 mmol/L 24.0 23.0* 22.0*   BUN mg/dL 9 7 8   CREATININE mg/dL 0.54 0.47* 0.45*   CALCIUM mg/dL 8.8 8.7 8.7   GLUCOSE mg/dL 112* 97 96         Impression/Plan:  Patient Active Problem List   Diagnosis Code   • CVA (cerebral vascular accident) I63.9   • Oropharyngeal dysphagia R13.12     Oral pharyngeal dysphagia due to CVA  The patient is status post day 1 EGD performed on 10/10/17 for PEG tube placement due to impaired swallowing and need for nutrition without difficulty.  Okay to use PEG as needed.  If no longer needed after 6 weeks, please contact our office and we will be happy to remove.  It can, howeve,r be used indefinitely.  GI service is signing off.  Please reconsult if required.       Zeinab Ely, TIERRA  10/11/17  3:57 PM     Josefina Barry MD  [facility]  10/11/17  4:35 PM    Much of this encounter note is an electronic transcription/translation of spoken language to printed text. The electronic translation of spoken language may permit erroneous, or at times, nonsensical words or phrases to be inadvertently transcribed; although I have reviewed the note for such errors, some may still exist.

## 2017-10-11 NOTE — PLAN OF CARE
Problem: Patient Care Overview (Adult)  Goal: Plan of Care Review  Outcome: Ongoing (interventions implemented as appropriate)    10/11/17 0350   Coping/Psychosocial Response Interventions   Plan Of Care Reviewed With patient   Patient Care Overview   Progress no change   Outcome Evaluation   Outcome Summary/Follow up Plan No neuro changes. Speech garbled, and not understandable, pt screams often. Left side contracted. Turn Q2, no new skin breakdown. Free from falls. PEG tube in place, binder, CDI, TF to start in AM.          Problem: Stroke (Ischemic) (Adult)  Goal: Signs and Symptoms of Listed Potential Problems Will be Absent or Manageable (Stroke)  Outcome: Ongoing (interventions implemented as appropriate)    Problem: Fall Risk (Adult)  Goal: Absence of Falls  Outcome: Ongoing (interventions implemented as appropriate)    Problem: Pressure Ulcer Risk (Chuy Scale) (Adult,Obstetrics,Pediatric)  Goal: Skin Integrity  Outcome: Ongoing (interventions implemented as appropriate)    Problem: Nutrition, Enteral (Adult)  Goal: Signs and Symptoms of Listed Potential Problems Will be Absent or Manageable (Nutrition, Enteral)  Outcome: Ongoing (interventions implemented as appropriate)

## 2017-10-11 NOTE — PROGRESS NOTES
Continued Stay Note   Maria L     Patient Name: Pallavi Yee  MRN: [id number]  Today's Date: 10/11/2017    Admit Date: 10/4/2017          Discharge Plan       10/11/17 1021    Case Management/Social Work Plan    Plan SNF Placement    Additional Comments JIHAN spoke to Jorge in admissions at [facility] and she states that she and her co-worker, Ingrid, will be coming to do an on site evaluation tomorrow, 10/12. Jorge states that if patient is accepted, patient can admit on Friday if transportation can be arranged. Jorge states that if she needs to provide transportation, patient will not be able to admit until Monday, 10/16. JIHAN will follow for definite acceptance.            IGNACIO HayesW

## 2017-10-11 NOTE — PLAN OF CARE
Problem: Patient Care Overview (Adult)  Goal: Plan of Care Review  Outcome: Ongoing (interventions implemented as appropriate)    10/11/17 1253   Coping/Psychosocial Response Interventions   Plan Of Care Reviewed With patient   Patient Care Overview   Progress no change   Outcome Evaluation   Outcome Summary/Follow up Plan Patient agitated, asking for Coke. Explained why patient could not have Coke and attempted to give ice chip but patient hit at SLP and knocked spoon away. Discontinued therapy for day due to patient's behavior.         Problem: Inpatient SLP  Goal: Dysphagia- Patient will improve swallowing skills to begin to take some PO safely  Outcome: Ongoing (interventions implemented as appropriate)    10/05/17 0910 10/11/17 1253   Begin to Take Some PO Safely   Begin to Take Some PO Safely- SLP, Date Established 10/05/17 --    Begin to Take Some PO Safely- SLP, Time to Achieve by discharge --    Begin to Take Some PO Safely- SLP, Activity Level Patient will improve oral skills for more efficient eating;Patient will improve timing of pharyngeal response for safer, more efficient swallow --    Begin to Take Some PO Safely- SLP, Additional Goal Pt will tolerate PO trials with SLP without s/s of aspiration --    Begin to Take Some PO Safely- SLP, Date Goal Reviewed --  10/11/17   Begin to Take Some PO Safely- SLP, Outcome goal ongoing --

## 2017-10-11 NOTE — THERAPY TREATMENT NOTE
Acute Care - Occupational Therapy Treatment Note  [facility]     Patient Name: Pallavi Yee  : 1980  MRN: [id number]  Today's Date: 10/11/2017  Onset of Illness/Injury or Date of Surgery Date: 10/04/17  Date of Referral to OT: 10/05/17  Referring Physician: Dr. Guillen      Admit Date: 10/4/2017    Visit Dx:     ICD-10-CM ICD-9-CM   1. Oropharyngeal dysphagia R13.12 787.22   2. Mobility impaired Z74.09 799.89   3. Impaired mobility and ADLs Z74.09 799.89     Patient Active Problem List   Diagnosis   • CVA (cerebral vascular accident)   • Oropharyngeal dysphagia             Adult Rehabilitation Note       10/11/17 1348 10/11/17 1344 10/11/17 1000    Rehab Assessment/Intervention    Discipline occupational therapist  -MM --  -MM speech language pathologist  -KW    Document Type   therapy note (daily note)  -KW    Subjective Information agree to therapy;no complaints  -MM      Patient Effort, Rehab Treatment   poor  -KW    Precautions/Limitations fall precautions  -MM      Recorded by [MM] Sean Ambriz OTR/L [MM] Sean Ambriz OTR/L [KW] Malika Leong MS CCC-SLP    Pain Assessment    Pain Assessment No/denies pain  -MM      Recorded by [MM] Sean Ambriz OTR/L      Cognitive Assessment/Intervention    Current Cognitive/Communication Assessment impaired  -MM      Recorded by [MM] Sean Ambriz OTR/L      Dysphagia/Swallow Intervention    Dysphagia/Swallow Therapeutic Feed   Patient agitated, asking for Coke.  Explained why patient could not have Coke and attempted to give ice chip but patient hit at SLP and knocked spoon away.  Discontinued therapy for day due to patient's behavior.  -KW    Recorded by   [KW] Malika Leong MS CCC-SLP    Bed Mobility, Assessment/Treatment    Bed Mobility, Roll Left, [place] maximum assist (25% patient effort);dependent (less than 25% patient effort);2 person assist required;verbal cues required  -MM      Bed Mobility, Roll Right, [place] maximum  assist (25% patient effort);dependent (less than 25% patient effort);2 person assist required;verbal cues required  -MM      Bed Mobility, Impairments strength decreased;muscle tone abnormal;coordination impaired  -MM      Recorded by [MM] SUE Snow      Toileting Assessment/Training    Toileting Assess/Train, Position supine  -MM      Toileting Assess/Train, Indepen Level dependent (less than 25% patient effort);2 person assist required;verbal cues required  -MM      Toileting Assess/Train, Impairments muscle tone abnormal;decreased flexibility;impaired balance;coordination impaired;strength decreased;motor control impaired  -MM      Recorded by [MM] SUE Snow      Therapy Exercises    Left Upper Extremity PROM:;5 reps;10 reps;supine;elbow flexion/extension;hand pumps;shoulder extension/flexion   wrist flex/ext  -MM      Recorded by [MM] SUE Snow      Positioning and Restraints    Pre-Treatment Position in bed  -MM      Post Treatment Position bed  -MM      In Bed supine;call light within reach;encouraged to call for assist;side rails up x2  -MM      Recorded by [MM] SUE Snow        10/10/17 0802          Rehab Assessment/Intervention    Discipline occupational therapy assistant  -      Document Type therapy note (daily note)  -      Subjective Information no complaints;agree to therapy  -      Patient Effort, Rehab Treatment fair  -CJ      Precautions/Limitations fall precautions  -CJ      Recorded by [CJ] KAREEM Menendez/L      Pain Assessment    Pain Assessment Peterson-Cuevas FACES  -CJ      Peterson-Cuevas FACES Pain Rating 0  -CJ      Recorded by [CJ] MEHNAZ Menendez      ROM (Range of Motion)    General ROM --   prom Lue to decrease tone! Rue aarom!  -CJ      Recorded by [CJ] KAREEM Menendez/L      Bed Mobility, Assessment/Treatment    Bed Mobility, Comment supine in bed!  -CJ      Recorded by [CJ] KAREEM Menendez/YEN       Positioning and Restraints    Pre-Treatment Position in bed  -CJ      Post Treatment Position bed  -CJ      In Bed supine;call light within reach;encouraged to call for assist;exit alarm on;side rails up x2  -CJ      Recorded by [CJ] RANDOLPH MenendezA/L        User Key  (r) = Recorded By, (t) = Taken By, (c) = Cosigned By    Initials Name Effective Dates     Antwan Hobbs, SERNA/L 08/02/16 -     KW Malika Leong, MS CCC-SLP 08/02/16 -     MM Sean Ambriz, OTR/L 10/21/16 -                 OT Goals       10/05/17 1615          Bed Mobility OT LTG    Bed Mobility OT LTG, Date Established 10/05/17  -MM      Bed Mobility OT LTG, Time to Achieve by discharge  -MM      Bed Mobility OT LTG, Activity Type all bed mobility  -MM      Bed Mobility OT LTG, Elrod Level maximum assist (25% patient effort)  -MM      Bed Mobility OT LTG, Assist Device bed rails  -MM      Range of Motion OT LTG    Range of Motion Goal OT LTG, Date Established 10/05/17  -MM      Range of Motion Goal OT LTG, Time to Achieve by discharge  -MM      Range of Motion Goal OT LTG, AROM Measure Pt will progress with ROM to L UE/LE to decrease tone and prevent contracture.  -MM      Eating Self-Feeding OT LTG    Eat Self Feeding Goal OT LTG, Date Established 10/05/17  -MM      Eat Self Feeding Goal OT LTG, Time to Achieve by discharge  -MM      Eat Self Feed Goal OT LTG, Elrod Level supervision required;set up required  -MM      Eat Self Feeding Goal OT LTG, Adaptive Equip utensils, large   -MM      Eat Self Feeding Goal OT LTG, Position reclined, sitting in bed  -MM      Grooming OT LTG    Grooming Goal OT LTG, Date Established 10/05/17  -MM      Grooming Goal OT LTG, Time to Achieve by discharge  -MM      Grooming Goal OT LTG, Elrod Level moderate assist (50% patient effort)  -MM      Grooming Goal OT LTG, Position reclined, sitting in bed  -MM        User Key  (r) = Recorded By, (t) = Taken By, (c) = Cosigned By     Initials Name Provider Type     Sean DECLAN Ambriz, OTR/L Occupational Therapist          Occupational Therapy Education     Title: PT OT SLP Therapies (Active)     Topic: Occupational Therapy (Active)     Point: ADL training (Active)    Description: Instruct learner(s) on proper safety adaptation and remediation techniques during self care or transfers.   Instruct in proper use of assistive devices.    Learning Progress Summary    Learner Readiness Method Response Comment Documented by Status   Patient Acceptance E NR benefits of activity MM 10/11/17 1435 Active    Acceptance E,D VU,NR ADLs, ROM TS 10/07/17 1007 Done    Acceptance E NR pt and facility staff educated on OT role, benefits. MM 10/05/17 1613 Active   Other Acceptance E NR pt and facility staff educated on OT role, benefits.  10/05/17 1613 Active               Point: Home exercise program (Done)    Description: Instruct learner(s) on appropriate technique for monitoring, assisting and/or progressing therapeutic exercises/activities.    Learning Progress Summary    Learner Readiness Method Response Comment Documented by Status   Patient Acceptance E,ZAIRE FLEMING,NR Prom Lue/Aarom Rue with vc's!  10/10/17 1124 Done               Point: Precautions (Done)    Description: Instruct learner(s) on prescribed precautions during self-care and functional transfers.    Learning Progress Summary    Learner Readiness Method Response Comment Documented by Status   Patient Acceptance DECLAN,ZAIRE FLEMING,NR Prom Lue/Aarom Rue with vc's!  10/10/17 1124 Done               Point: Body mechanics (Done)    Description: Instruct learner(s) on proper positioning and spine alignment during self-care, functional mobility activities and/or exercises.    Learning Progress Summary    Learner Readiness Method Response Comment Documented by Status   Patient Acceptance E,ZAIRE FLEMING,NR Prom Lue/Aarom Rue with vc's!  10/10/17 1124 Done                      User Key     Initials Effective Dates  Name Provider Type Discipline     08/02/16 -  KAREEM Menendez/L Occupational Therapy Assistant OT    TS 08/02/16 -  KAREEM Luke/L Occupational Therapy Assistant OT    MM 10/21/16 -  Sean Ambriz, OTR/L Occupational Therapist OT                  OT Recommendation and Plan  Anticipated Discharge Disposition: assisted living, extended care facility  Planned Therapy Interventions: activity intolerance, adaptive equipment training, ADL retraining, balance training, bed mobility training, energy conservation, fine motor coordination training, home exercise program, motor coordination training, neuromuscular re-education, strengthening, stretching, ROM (Range of Motion)  Therapy Frequency: 3-5 times/wk  Plan of Care Review  Plan Of Care Reviewed With: patient  Progress: progress toward functional goals is gradual  Outcome Summary/Follow up Plan: OT tx completed. Pt was max/dep x2 for rolling. Pt dep x2 for toilet hygiene. Pt completed PROM to LUE. Cont OT POC.        Outcome Measures       10/11/17 1400 10/10/17 0802       How much help from another is currently needed...    Putting on and taking off regular lower body clothing? 1  -MM 1  -CJ     Bathing (including washing, rinsing, and drying) 1  -MM 1  -CJ     Toileting (which includes using toilet bed pan or urinal) 1  -MM 1  -CJ     Putting on and taking off regular upper body clothing 2  -MM 2  -CJ     Taking care of personal grooming (such as brushing teeth) 2  -MM 2  -CJ     Eating meals 2  -MM 2  -CJ     Score 9  -MM 9  -CJ     Functional Assessment    Outcome Measure Options AM-PAC 6 Clicks Daily Activity (OT)  -MM AM-PAC 6 Clicks Daily Activity (OT)  -CJ       User Key  (r) = Recorded By, (t) = Taken By, (c) = Cosigned By    Initials Name Provider Type     KAREEM Menendez/EYN Occupational Therapy Assistant    MM Sean Ambriz, OTR/L Occupational Therapist           Time Calculation:         Time Calculation- OT        10/11/17 1439          Time Calculation- OT    OT Start Time 1348  -MM      OT Stop Time 1420  -MM      OT Time Calculation (min) 32 min  -MM      Total Timed Code Minutes- OT 32 minute(s)  -MM      OT Received On 10/11/17  -MM        User Key  (r) = Recorded By, (t) = Taken By, (c) = Cosigned By    Initials Name Provider Type    MM Sean Ambriz OTR/L Occupational Therapist           Therapy Charges for Today     Code Description Service Date Service Provider Modifiers Qty    86423345668  OT THER PROC EA 15 MIN 10/11/2017 Sean Ambriz OTR/L GO, KX 1    [id number] HC OT SELF CARE/MGMT/TRAIN EA 15 MIN 10/11/2017 Sean Ambriz OTR/L GO, KX 1          OT G-codes  OT Professional Judgement Used?: Yes  OT Functional Scales Options: AM-PAC 6 Clicks Daily Activity (OT)  Score: 6  Functional Limitation: Self care  Self Care Current Status (): 100 percent impaired, limited or restricted  Self Care Goal Status (): At least 80 percent but less than 100 percent impaired, limited or restricted    NAHOMI Arroyo/YEN  10/11/2017

## 2017-10-11 NOTE — PROGRESS NOTES
[facility] Medicine Services  INPATIENT PROGRESS NOTE    Length of Stay: 7  Date of Admission: 10/4/2017  Primary Care Physician: ROBERTH Beckham    Subjective   Chief Complaint: AMS, slurred speech, secretions  HPI   Patient has PEG tube. Continues to have upper respiratory congestion. I am not able to understand her and there is no family present.       Review of Systems   Secretions  SP PEG  We will to obtain due to her current mental status    Objective    Temp:  [97.9 °F (36.6 °C)-98.5 °F (36.9 °C)] 98.5 °F (36.9 °C)  Heart Rate:  [] 112  Resp:  [16-20] 16  BP: (138-166)/(75-80) 166/80  Physical Exam   Constitutional:   Awake, unable to communicate, garbled speech, congested, secretions   HENT:   Head: Normocephalic and atraumatic.   Eyes: Conjunctivae and EOM are normal. Pupils are equal, round, and reactive to light.   Neck: Neck supple. No JVD present. No thyromegaly present.   Cardiovascular: Normal rate, regular rhythm, normal heart sounds and intact distal pulses.  Exam reveals no gallop and no friction rub.    No murmur heard.  Pulmonary/Chest: Effort normal and breath sounds normal. No respiratory distress. She has no wheezes. She has no rales. She exhibits no tenderness.   Abdominal: Soft. Bowel sounds are normal. She exhibits no distension. There is no tenderness. There is no rebound and no guarding.   Genitourinary:   Genitourinary Comments: Walsh in place   Musculoskeletal: Normal range of motion. She exhibits no edema, tenderness or deformity.   Lymphadenopathy:     She has no cervical adenopathy.   Neurological: She is alert. She displays normal reflexes. No cranial nerve deficit. She exhibits normal muscle tone.   Moving right arm, not answering questions or following commands, contractures of left extremities   Skin: Skin is warm and dry. No rash noted.           Results Review:  I have reviewed the labs, radiology results, and diagnostic  studies.    Laboratory Data:     Results from last 7 days  Lab Units 10/11/17  0722 10/10/17  0513 10/09/17  0800   WBC 10*3/mm3 9.40 6.86 8.11   HEMOGLOBIN g/dL 11.9* 11.3* 11.6*   HEMATOCRIT % 36.5* 34.6* 34.8*   PLATELETS 10*3/mm3 204 196 176          Results from last 7 days  Lab Units 10/11/17  0722 10/10/17  0513 10/09/17  0800   SODIUM mmol/L 141 138 136   POTASSIUM mmol/L 3.6 3.5 3.9   CHLORIDE mmol/L 105 105 106   CO2 mmol/L 24.0 23.0* 22.0*   BUN mg/dL 9 7 8   CREATININE mg/dL 0.54 0.47* 0.45*   CALCIUM mg/dL 8.8 8.7 8.7   GLUCOSE mg/dL 112* 97 96       Culture Data:   Blood Culture   Date Value Ref Range Status   10/04/2017 No growth at less than 24 hours  Preliminary   10/04/2017 No growth at less than 24 hours  Preliminary     Respiratory Culture   Date Value Ref Range Status   10/04/2017 Scant growth (1+) Normal Respiratory Jackie  Preliminary   10/04/2017 Scant growth (1+) Yeast isolated (A)  Preliminary       Radiology Data:   Imaging Results (last 24 hours)     ** No results found for the last 24 hours. **          I have reviewed the patient current medications.     Assessment/Plan     Hospital Problem List     * (Principal)Oropharyngeal dysphagia    Overview Signed 10/10/2017  7:34 AM by Josefina Barry MD     Added automatically from request for surgery [id number]         CVA (cerebral vascular accident)          1. CVA with abnormal left internal carotid CTA pending unsure if could get done  2. Concern of aspiration pna-no fever, normal white count. Received rocephin x 2 days at [facility]. Started on clinda here. Cultures show no growth, except light yeast. Suctioning for secretions.  Continue scopolamine patch to help with secretions. SP PEG placement  3. PT/OT/ST  4. Right rib fractures  5. Hx of TBI with significant underlying deficiencies    Plan to facility in [place] on Friday.        Discharge Planning: I expect the patient to be discharged to facility in 1-2 days.    Dylan Johnson, DO   10/11/17    4:12 PM

## 2017-10-12 ENCOUNTER — APPOINTMENT (OUTPATIENT)
Dept: GENERAL RADIOLOGY | Facility: HOSPITAL | Age: 37
End: 2017-10-12

## 2017-10-12 LAB
ANION GAP SERPL CALCULATED.3IONS-SCNC: 9 MMOL/L (ref 4–13)
BASOPHILS # BLD AUTO: 0.02 10*3/MM3 (ref 0–0.2)
BASOPHILS NFR BLD AUTO: 0.3 % (ref 0–2)
BUN BLD-MCNC: 7 MG/DL (ref 5–21)
BUN/CREAT SERPL: 16.3 (ref 7–25)
CALCIUM SPEC-SCNC: 9.1 MG/DL (ref 8.4–10.4)
CHLORIDE SERPL-SCNC: 105 MMOL/L (ref 98–110)
CO2 SERPL-SCNC: 26 MMOL/L (ref 24–31)
CREAT BLD-MCNC: 0.43 MG/DL (ref 0.5–1.4)
DEPRECATED RDW RBC AUTO: 53.6 FL (ref 40–54)
EOSINOPHIL # BLD AUTO: 0 10*3/MM3 (ref 0–0.7)
EOSINOPHIL NFR BLD AUTO: 0 % (ref 0–4)
ERYTHROCYTE [DISTWIDTH] IN BLOOD BY AUTOMATED COUNT: 15.2 % (ref 12–15)
GFR SERPL CREATININE-BSD FRML MDRD: >150 ML/MIN/1.73
GLUCOSE BLD-MCNC: 108 MG/DL (ref 70–100)
HCT VFR BLD AUTO: 35.4 % (ref 37–47)
HGB BLD-MCNC: 11.4 G/DL (ref 12–16)
IMM GRANULOCYTES # BLD: 0.19 10*3/MM3 (ref 0–0.03)
IMM GRANULOCYTES NFR BLD: 2.6 % (ref 0–5)
LYMPHOCYTES # BLD AUTO: 1.57 10*3/MM3 (ref 0.72–4.86)
LYMPHOCYTES NFR BLD AUTO: 21.9 % (ref 15–45)
MCH RBC QN AUTO: 31.1 PG (ref 28–32)
MCHC RBC AUTO-ENTMCNC: 32.2 G/DL (ref 33–36)
MCV RBC AUTO: 96.7 FL (ref 82–98)
MONOCYTES # BLD AUTO: 0.87 10*3/MM3 (ref 0.19–1.3)
MONOCYTES NFR BLD AUTO: 12.1 % (ref 4–12)
NEUTROPHILS # BLD AUTO: 4.53 10*3/MM3 (ref 1.87–8.4)
NEUTROPHILS NFR BLD AUTO: 63.1 % (ref 39–78)
PLATELET # BLD AUTO: 198 10*3/MM3 (ref 130–400)
PMV BLD AUTO: 11.8 FL (ref 6–12)
POTASSIUM BLD-SCNC: 3.5 MMOL/L (ref 3.5–5.3)
RBC # BLD AUTO: 3.66 10*6/MM3 (ref 4.2–5.4)
SODIUM BLD-SCNC: 140 MMOL/L (ref 135–145)
WBC NRBC COR # BLD: 7.18 10*3/MM3 (ref 4.8–10.8)

## 2017-10-12 PROCEDURE — 80048 BASIC METABOLIC PNL TOTAL CA: CPT | Performed by: FAMILY MEDICINE

## 2017-10-12 PROCEDURE — 63710000001 PREDNISONE PER 5 MG: Performed by: INTERNAL MEDICINE

## 2017-10-12 PROCEDURE — 71010 HC CHEST PA OR AP: CPT

## 2017-10-12 PROCEDURE — 25010000002 ENOXAPARIN PER 10 MG: Performed by: FAMILY MEDICINE

## 2017-10-12 PROCEDURE — 85025 COMPLETE CBC W/AUTO DIFF WBC: CPT | Performed by: FAMILY MEDICINE

## 2017-10-12 RX ORDER — GABAPENTIN 300 MG/1
300 CAPSULE ORAL EVERY 12 HOURS SCHEDULED
Status: DISCONTINUED | OUTPATIENT
Start: 2017-10-12 | End: 2017-10-19 | Stop reason: HOSPADM

## 2017-10-12 RX ORDER — SACCHAROMYCES BOULARDII 250 MG
250 CAPSULE ORAL 2 TIMES DAILY
Status: DISCONTINUED | OUTPATIENT
Start: 2017-10-12 | End: 2017-10-19 | Stop reason: HOSPADM

## 2017-10-12 RX ORDER — GLYCOPYRROLATE 1 MG/1
1 TABLET ORAL 2 TIMES DAILY
Status: DISCONTINUED | OUTPATIENT
Start: 2017-10-12 | End: 2017-10-14

## 2017-10-12 RX ORDER — BACLOFEN 10 MG/1
10 TABLET ORAL DAILY
Status: DISCONTINUED | OUTPATIENT
Start: 2017-10-13 | End: 2017-10-18

## 2017-10-12 RX ADMIN — NYSTATIN: 100000 POWDER TOPICAL at 13:00

## 2017-10-12 RX ADMIN — CLINDAMYCIN PHOSPHATE 300 MG: 6 INJECTION, SOLUTION INTRAVENOUS at 03:11

## 2017-10-12 RX ADMIN — NYSTATIN: 100000 POWDER TOPICAL at 21:23

## 2017-10-12 RX ADMIN — OXYBUTYNIN CHLORIDE 5 MG: 5 TABLET, FILM COATED, EXTENDED RELEASE ORAL at 09:06

## 2017-10-12 RX ADMIN — FLUOXETINE 10 MG: 10 CAPSULE ORAL at 09:05

## 2017-10-12 RX ADMIN — PREDNISONE 3 MG: 1 TABLET ORAL at 09:06

## 2017-10-12 RX ADMIN — ENOXAPARIN SODIUM 40 MG: 40 INJECTION SUBCUTANEOUS at 09:06

## 2017-10-12 RX ADMIN — BACLOFEN 20 MG: 10 TABLET ORAL at 09:06

## 2017-10-12 RX ADMIN — POLYVINYL ALCOHOL 2 DROP: 14 SOLUTION/ DROPS OPHTHALMIC at 21:23

## 2017-10-12 RX ADMIN — GABAPENTIN 600 MG: 300 CAPSULE ORAL at 09:05

## 2017-10-12 RX ADMIN — FUROSEMIDE 20 MG: 20 TABLET ORAL at 10:07

## 2017-10-12 RX ADMIN — LORAZEPAM 2 MG: 1 TABLET ORAL at 21:23

## 2017-10-12 RX ADMIN — GABAPENTIN 300 MG: 300 CAPSULE ORAL at 21:23

## 2017-10-12 RX ADMIN — DANTROLENE SODIUM 25 MG: 25 CAPSULE ORAL at 09:06

## 2017-10-12 RX ADMIN — Medication 250 MG: at 17:33

## 2017-10-12 RX ADMIN — SCOPALAMINE 2 PATCH: 1 PATCH, EXTENDED RELEASE TRANSDERMAL at 17:33

## 2017-10-12 RX ADMIN — CLINDAMYCIN PHOSPHATE 300 MG: 6 INJECTION, SOLUTION INTRAVENOUS at 18:04

## 2017-10-12 RX ADMIN — DANTROLENE SODIUM 25 MG: 25 CAPSULE ORAL at 15:35

## 2017-10-12 RX ADMIN — Medication 81 MG: at 09:05

## 2017-10-12 RX ADMIN — DANTROLENE SODIUM 25 MG: 25 CAPSULE ORAL at 21:23

## 2017-10-12 RX ADMIN — GLYCOPYRROLATE 1 MG: 1 TABLET ORAL at 17:33

## 2017-10-12 RX ADMIN — CLINDAMYCIN PHOSPHATE 300 MG: 6 INJECTION, SOLUTION INTRAVENOUS at 10:08

## 2017-10-12 NOTE — PROGRESS NOTES
[facility] Medicine Services  INPATIENT PROGRESS NOTE    Length of Stay: 8  Date of Admission: 10/4/2017  Primary Care Physician: ROBERTH Beckham    Subjective   Chief Complaint: Follow-up stroke; aspiration PNA  HPI   Patient currently resting comfortably.  Continues to have quite a bit of upper respiratory secretions.  Appears to be tolerating tube feeds without problem via her G-tube.  No new events reported overnight.    Review of Systems     All pertinent negatives and positives are as above. All other systems have been reviewed and are negative unless otherwise stated.     Objective    Temp:  [96.8 °F (36 °C)-98.4 °F (36.9 °C)] 97.8 °F (36.6 °C)  Heart Rate:  [72-92] 92  Resp:  [18-20] 18  BP: (136-146)/(70-93) 146/70  Physical Exam   Constitutional: No distress.   Sleepy; resting comfortably   HENT:   Quite a bit of oral, upper respiratory secretions   Cardiovascular: Normal rate and regular rhythm.    Pulmonary/Chest: Effort normal. No respiratory distress.   Upper airway noise obscures deeper breath sounds   Abdominal: Soft.   PEG in place   Skin: Skin is warm and dry. She is not diaphoretic.   Vitals reviewed.      Results Review:  I have reviewed the labs, radiology results, and diagnostic studies.    Laboratory Data:     Results from last 7 days  Lab Units 10/12/17  0903 10/11/17  0722 10/10/17  0513   WBC 10*3/mm3 7.18 9.40 6.86   HEMOGLOBIN g/dL 11.4* 11.9* 11.3*   HEMATOCRIT % 35.4* 36.5* 34.6*   PLATELETS 10*3/mm3 198 204 196          Results from last 7 days  Lab Units 10/12/17  0903 10/11/17  0722 10/10/17  0513   SODIUM mmol/L 140 141 138   POTASSIUM mmol/L 3.5 3.6 3.5   CHLORIDE mmol/L 105 105 105   CO2 mmol/L 26.0 24.0 23.0*   BUN mg/dL 7 9 7   CREATININE mg/dL 0.43* 0.54 0.47*   CALCIUM mg/dL 9.1 8.8 8.7   GLUCOSE mg/dL 108* 112* 97       Culture Data:        Radiology Data:   Imaging Results (last 24 hours)     ** No results found for the last 24 hours.  **          I have reviewed the patient current medications.     Assessment/Plan     Hospital Problem List     * (Principal)Oropharyngeal dysphagia    Overview Signed 10/10/2017  7:34 AM by Josefina Barry MD     Added automatically from request for surgery [id number]         CVA (cerebral vascular accident)        Assessment:  1.  Multiple strokes in the left subcortical region  2.  History of traumatic brain injury  3.  Dysphagia requiring PEG placement  4.  Dysarthria  5.  Aspiration pneumonia  6.  Right sided rib fractures    Plan:  1.  IV Clinda - currently day 8 of therapy  2.  Repeat CXR today  3.  Scopolamine patch; add trial of Glycopyrrolate for upper respiratory secretions  4.  Tolerating TFs with Jevity  5.  Add probiotic  6.  ASA therapy  7.  PT/OT/ST  8.  Anticipate discharge to [facility] tomorrow  9.  Patient seems sleepy this afternoon; will try to limit some of her sedating meds and follow along closely    Dami Keane MD   10/12/17   2:56 PM

## 2017-10-12 NOTE — PROGRESS NOTES
Continued Stay Note   Mraia L     Patient Name: Pallavi Yee  MRN: [id number]  Today's Date: 10/12/2017    Admit Date: 10/4/2017          Discharge Plan       10/12/17 1428    Case Management/Social Work Plan    Plan [facility]     Additional Comments Jorge and Ingrid have evaluated patient on site and have accepted patient for admission to [facility]. Patient can admit there tomorrow and will need EMS transport. Ingrid states she will speak to patient's  at [facility] re approval and will call  to advise of definite approval for payment. JIHAN will follow for return phone call from Ingrid.            IGNACIO HayesW

## 2017-10-12 NOTE — PLAN OF CARE
Problem: Patient Care Overview (Adult)  Goal: Plan of Care Review  Outcome: Ongoing (interventions implemented as appropriate)    10/11/17 1948   Coping/Psychosocial Response Interventions   Plan Of Care Reviewed With patient   Patient Care Overview   Progress no change   Outcome Evaluation   Outcome Summary/Follow up Plan Pt was comabative and refused care at [facility] this shift. Jevity 1.5 is going at 30 ml.hr at this time with no resdiual. Meds givwen via peg tube this shift. REfusing to wear abdominal binder. Will continue to monitor.          Problem: Stroke (Ischemic) (Adult)  Goal: Signs and Symptoms of Listed Potential Problems Will be Absent or Manageable (Stroke)  Outcome: Ongoing (interventions implemented as appropriate)    Problem: Fall Risk (Adult)  Goal: Absence of Falls  Outcome: Ongoing (interventions implemented as appropriate)    Problem: Wound, Traumatic, Nonburn (Adult)  Goal: Signs and Symptoms of Listed Potential Problems Will be Absent or Manageable (Wound, Traumatic, Nonburn)  Outcome: Ongoing (interventions implemented as appropriate)    Problem: Pressure Ulcer Risk (Chuy Scale) (Adult,Obstetrics,Pediatric)  Goal: Skin Integrity  Outcome: Ongoing (interventions implemented as appropriate)    Problem: Nutrition, Enteral (Adult)  Goal: Signs and Symptoms of Listed Potential Problems Will be Absent or Manageable (Nutrition, Enteral)  Outcome: Ongoing (interventions implemented as appropriate)

## 2017-10-12 NOTE — PLAN OF CARE
Problem: Patient Care Overview (Adult)  Goal: Plan of Care Review  Outcome: Ongoing (interventions implemented as appropriate)    10/12/17 0349   Coping/Psychosocial Response Interventions   Plan Of Care Reviewed With patient   Patient Care Overview   Progress no change   Outcome Evaluation   Outcome Summary/Follow up Plan No neuro changes. Pt receiving ativan at HS, slept calmly most of the night. Turn Q2, no new skin breakdown. Nystatin applied ot groin for excoriation. Tube feed at 40, tolerating well, no residual. Suction prn, pt prefers to do herself. Pt mother wanted to talk to hospitalist about patient details, was concerned about her swallowing status and returning to her group home. Wanted staff to try giving her a Coke to swallow, since pt was uncooperative with speech. Explained the risk of aspiration again.          Problem: Stroke (Ischemic) (Adult)  Goal: Signs and Symptoms of Listed Potential Problems Will be Absent or Manageable (Stroke)  Outcome: Ongoing (interventions implemented as appropriate)    Problem: Fall Risk (Adult)  Goal: Absence of Falls  Outcome: Ongoing (interventions implemented as appropriate)    Problem: Pressure Ulcer Risk (Chuy Scale) (Adult,Obstetrics,Pediatric)  Goal: Skin Integrity  Outcome: Ongoing (interventions implemented as appropriate)    Problem: Nutrition, Enteral (Adult)  Goal: Signs and Symptoms of Listed Potential Problems Will be Absent or Manageable (Nutrition, Enteral)  Outcome: Ongoing (interventions implemented as appropriate)

## 2017-10-13 ENCOUNTER — TELEPHONE (OUTPATIENT)
Dept: GASTROENTEROLOGY | Facility: CLINIC | Age: 37
End: 2017-10-13

## 2017-10-13 PROCEDURE — 25010000002 CEFEPIME: Performed by: INTERNAL MEDICINE

## 2017-10-13 PROCEDURE — 87186 SC STD MICRODIL/AGAR DIL: CPT | Performed by: INTERNAL MEDICINE

## 2017-10-13 PROCEDURE — 94799 UNLISTED PULMONARY SVC/PX: CPT

## 2017-10-13 PROCEDURE — 87077 CULTURE AEROBIC IDENTIFY: CPT | Performed by: INTERNAL MEDICINE

## 2017-10-13 PROCEDURE — 92526 ORAL FUNCTION THERAPY: CPT

## 2017-10-13 PROCEDURE — 94640 AIRWAY INHALATION TREATMENT: CPT

## 2017-10-13 PROCEDURE — 25010000002 ENOXAPARIN PER 10 MG: Performed by: FAMILY MEDICINE

## 2017-10-13 PROCEDURE — 25010000002 LEVOFLOXACIN PER 250 MG: Performed by: INTERNAL MEDICINE

## 2017-10-13 PROCEDURE — 99232 SBSQ HOSP IP/OBS MODERATE 35: CPT | Performed by: PSYCHIATRY & NEUROLOGY

## 2017-10-13 PROCEDURE — 87205 SMEAR GRAM STAIN: CPT | Performed by: INTERNAL MEDICINE

## 2017-10-13 PROCEDURE — 63710000001 PREDNISONE PER 5 MG: Performed by: INTERNAL MEDICINE

## 2017-10-13 PROCEDURE — 97530 THERAPEUTIC ACTIVITIES: CPT

## 2017-10-13 PROCEDURE — 87070 CULTURE OTHR SPECIMN AEROBIC: CPT | Performed by: INTERNAL MEDICINE

## 2017-10-13 RX ORDER — ACETYLCYSTEINE 200 MG/ML
1.5 SOLUTION ORAL; RESPIRATORY (INHALATION)
Status: DISCONTINUED | OUTPATIENT
Start: 2017-10-13 | End: 2017-10-18

## 2017-10-13 RX ORDER — ACETYLCYSTEINE 200 MG/ML
2 SOLUTION ORAL; RESPIRATORY (INHALATION)
Status: DISCONTINUED | OUTPATIENT
Start: 2017-10-13 | End: 2017-10-13 | Stop reason: SDUPTHER

## 2017-10-13 RX ORDER — LEVOFLOXACIN 5 MG/ML
750 INJECTION, SOLUTION INTRAVENOUS EVERY 24 HOURS
Status: DISCONTINUED | OUTPATIENT
Start: 2017-10-13 | End: 2017-10-14

## 2017-10-13 RX ORDER — IPRATROPIUM BROMIDE AND ALBUTEROL SULFATE 2.5; .5 MG/3ML; MG/3ML
3 SOLUTION RESPIRATORY (INHALATION)
Status: DISCONTINUED | OUTPATIENT
Start: 2017-10-13 | End: 2017-10-19 | Stop reason: HOSPADM

## 2017-10-13 RX ADMIN — OXYBUTYNIN CHLORIDE 5 MG: 5 TABLET, FILM COATED, EXTENDED RELEASE ORAL at 10:09

## 2017-10-13 RX ADMIN — GLYCOPYRROLATE 1 MG: 1 TABLET ORAL at 18:13

## 2017-10-13 RX ADMIN — POLYVINYL ALCOHOL 2 DROP: 14 SOLUTION/ DROPS OPHTHALMIC at 21:51

## 2017-10-13 RX ADMIN — NYSTATIN: 100000 POWDER TOPICAL at 21:50

## 2017-10-13 RX ADMIN — LEVOFLOXACIN 750 MG: 5 INJECTION, SOLUTION INTRAVENOUS at 18:14

## 2017-10-13 RX ADMIN — CLINDAMYCIN PHOSPHATE 300 MG: 6 INJECTION, SOLUTION INTRAVENOUS at 10:09

## 2017-10-13 RX ADMIN — FLUOXETINE 10 MG: 10 CAPSULE ORAL at 10:10

## 2017-10-13 RX ADMIN — NYSTATIN 1 APPLICATION: 100000 POWDER TOPICAL at 10:11

## 2017-10-13 RX ADMIN — GABAPENTIN 300 MG: 300 CAPSULE ORAL at 10:10

## 2017-10-13 RX ADMIN — Medication 250 MG: at 10:09

## 2017-10-13 RX ADMIN — BACLOFEN 10 MG: 10 TABLET ORAL at 10:09

## 2017-10-13 RX ADMIN — ACETYLCYSTEINE 1.5 ML: 200 SOLUTION ORAL; RESPIRATORY (INHALATION) at 20:09

## 2017-10-13 RX ADMIN — Medication 81 MG: at 10:09

## 2017-10-13 RX ADMIN — DANTROLENE SODIUM 25 MG: 25 CAPSULE ORAL at 10:09

## 2017-10-13 RX ADMIN — IPRATROPIUM BROMIDE AND ALBUTEROL SULFATE 3 ML: 2.5; .5 SOLUTION RESPIRATORY (INHALATION) at 20:09

## 2017-10-13 RX ADMIN — IPRATROPIUM BROMIDE AND ALBUTEROL SULFATE 3 ML: 2.5; .5 SOLUTION RESPIRATORY (INHALATION) at 15:53

## 2017-10-13 RX ADMIN — Medication 250 MG: at 18:13

## 2017-10-13 RX ADMIN — ENOXAPARIN SODIUM 40 MG: 40 INJECTION SUBCUTANEOUS at 10:10

## 2017-10-13 RX ADMIN — CLINDAMYCIN PHOSPHATE 300 MG: 6 INJECTION, SOLUTION INTRAVENOUS at 03:38

## 2017-10-13 RX ADMIN — DANTROLENE SODIUM 25 MG: 25 CAPSULE ORAL at 18:13

## 2017-10-13 RX ADMIN — FUROSEMIDE 20 MG: 20 TABLET ORAL at 10:10

## 2017-10-13 RX ADMIN — LORAZEPAM 2 MG: 1 TABLET ORAL at 21:50

## 2017-10-13 RX ADMIN — CEFEPIME 2 G: 2 INJECTION, POWDER, FOR SOLUTION INTRAVENOUS at 22:57

## 2017-10-13 RX ADMIN — GABAPENTIN 300 MG: 300 CAPSULE ORAL at 21:50

## 2017-10-13 RX ADMIN — DANTROLENE SODIUM 25 MG: 25 CAPSULE ORAL at 21:51

## 2017-10-13 RX ADMIN — GLYCOPYRROLATE 1 MG: 1 TABLET ORAL at 10:10

## 2017-10-13 RX ADMIN — PREDNISONE 3 MG: 1 TABLET ORAL at 10:09

## 2017-10-13 NOTE — PLAN OF CARE
Problem: Patient Care Overview (Adult)  Goal: Plan of Care Review  Outcome: Ongoing (interventions implemented as appropriate)    10/13/17 1700   Coping/Psychosocial Response Interventions   Plan Of Care Reviewed With mother   Patient Care Overview   Progress no change   Outcome Evaluation   Outcome Summary/Follow up Plan mother called for update on patient status today. VAT team restarted iv in right forearm today. started on 2 new abx. sputum cx sent per orders. TF continues at goal rate with no residual.

## 2017-10-13 NOTE — PLAN OF CARE
Problem: Patient Care Overview (Adult)  Goal: Plan of Care Review  Outcome: Ongoing (interventions implemented as appropriate)    10/13/17 1430   Patient Care Overview   Progress no change   Outcome Evaluation   Outcome Summary/Follow up Plan Pt remains NPO, TF is at goal rate and pt seems to be tolerating well with little residuals. Will continue to follow.

## 2017-10-13 NOTE — CONSULTS
PULMONARY & CRITICAL CARE CONSULT - [facility]    10/13/17, 5:40 PM  Patient Care Team:  ROBERTH Beckham as PCP - General (Physician Assistant)  Name: Pallavi Yee  : 1980  MRN: [id number]  Contact Serial Number [id number]    Chief complaint: Abnormal chest imaging studies  HPI:  We have been consulted by Dami Keane MD to see this 36 y.o. year old female born on 1980.  This unfortunate lady has a history of traumatic brain injury and chronic left-sided weakness.  She also recently apparently had a superimposed stroke.  She initially had evaluation [facility] hospital was transferred to [facility] on the fourth.  Initial x-ray did show some left lung infiltrate and this is worse on the more recent chest x-ray.  She can provide absolutely no history.  She is awake and able to verbalize but again is not able to provide any history.  Past Medical History:  Past Medical History:   Diagnosis Date   • Depression    • Left-sided weakness    • Lupus    • MRSA (methicillin resistant staph aureus) culture positive    • TBI (traumatic brain injury)      Past Surgical History:   Procedure Laterality Date   •  SECTION     • ENDOSCOPY N/A 10/10/2017    Procedure: ESOPHAGOGASTRODUODENOSCOPY WITH ANESTHESIA;  Surgeon: Josefina Barry MD;  Location: [facility] ENDOSCOPY;  Service:    • PEG TUBE INSERTION N/A 10/10/2017    Procedure: PERCUTANEOUS ENDOSCOPIC GASTROSTOMY TUBE INSERTION;  Surgeon: Josefina Barry MD;  Location: [facility] ENDOSCOPY;  Service:    • TRACHEOSTOMY       Allergies   Allergen Reactions   • Adhesive Tape      Blisters     • Latex    • Meperidine    • Other      Band aids   • Vancomycin      Red man syndrome   • Morphine Rash     Medications:    aspirin 81 mg Oral Daily   Or      aspirin 300 mg Rectal Daily   baclofen 10 mg Oral Daily   cefepime 2 g Intravenous Once   cefepime 2 g Intravenous Q8H   dantrolene 25 mg Oral TID   enoxaparin 40 mg Subcutaneous  Daily   FLUoxetine 10 mg Oral Daily   furosemide 20 mg Per G Tube Daily   gabapentin 300 mg Oral Q12H   glycopyrrolate 1 mg Per G Tube BID   iopamidol 150 mL Intravenous Once in imaging   ipratropium-albuterol 3 mL Nebulization 4x Daily - RT   levoFLOXacin 750 mg Intravenous Q24H   LORazepam 2 mg Oral Nightly   nystatin  Topical Q12H   oxybutynin XL 5 mg Oral Daily   polyvinyl alcohol 2 drop Both Eyes Nightly   predniSONE 3 mg Oral Daily   saccharomyces boulardii 250 mg Per G Tube BID   Scopolamine 2 patch Transdermal Q72H        Family History:  Family History   Problem Relation Age of Onset   • Hypertension Mother      Social History:   reports that she has been smoking.  She does not have any smokeless tobacco history on file.  Review of Systems:  Review of Systems Unobtainable  Physical Exam:  Temp:  [98.2 °F (36.8 °C)-98.8 °F (37.1 °C)] 98.8 °F (37.1 °C)  Heart Rate:  [74-84] 74  Resp:  [16-20] 18  BP: (130-152)/(65-90) 152/90  Intake/Output Summary (Last 24 hours) at 10/13/17 1740  Last data filed at 10/13/17 1245   Gross per 24 hour   Intake             1817 ml   Output                0 ml   Net             1817 ml     Last 3 weights    10/10/17  2134 10/11/17  2308 10/12/17  2104   Weight: 162 lb 9.6 oz (73.8 kg) 164 lb 2 oz (74.4 kg) 164 lb 3 oz (74.5 kg)     SpO2 Readings from Last 3 Encounters:   10/13/17 92%     Physical Exam Gen.: She is a chronically ill-appearing white female.  HEENT: Unremarkable.  Eyes: Pupils are equal and round and reactive to light.  Neck: Supple.  Chest: Coarse rhonchi throughout particularly on the left.  Cardiac: No murmur or gallop noted.  Abdomen: Soft.  She does have a PEG tube in place which was placed recently I Dr. Barry.  Extremities: She has some upper extremity contractions on the left.  She has protective boots on both lower extremities.  Neurologic: She has a significant cognitive impairment and again some left-sided weakness.  Psychiatric: Cannot adequately  assess.  Musculoskeletal: She again has some left upper extremity contractions.  Lymphatic: No adenopathy palpated.    Results from last 7 days  Lab Units 10/12/17  0903 10/11/17  0722 10/10/17  0513   WBC 10*3/mm3 7.18 9.40 6.86   HEMOGLOBIN g/dL 11.4* 11.9* 11.3*   PLATELETS 10*3/mm3 198 204 196       Results from last 7 days  Lab Units 10/12/17  0903 10/11/17  0722 10/10/17  0513   SODIUM mmol/L 140 141 138   POTASSIUM mmol/L 3.5 3.6 3.5   CO2 mmol/L 26.0 24.0 23.0*   BUN mg/dL 7 9 7   CREATININE mg/dL 0.43* 0.54 0.47*   GLUCOSE mg/dL 108* 112* 97         Lab Results   Component Value Date    PROBNP 483.0 (H) 10/04/2017        Recent radiology:   Imaging Results (last 72 hours)     Procedure Component Value Units Date/Time    XR Chest 1 View [[id number]] Collected:  10/12/17 1618     Updated:  10/12/17 1625    Narrative:       History:  36-year-old evaluated to follow-up infiltrate.      Reference:  Chest radiograph 10/04/2017.     Findings:  Frontal chest radiograph performed.     There is extensive consolidation involving the left lower lobe and  portions of the left upper lobe. Appearance is worsened from 8 days  previous. Hazy right perihilar opacities are similar to prior exam.     Again noted multiple acute right-sided rib fractures. There is widening  of the right lateral pleural stripe suggesting very mild pleural fluid  accumulation. This may be related to the rib fractures. No pneumothorax.          Impression:       Impression:  Since 8 days previous, worsened consolidation throughout the left lung  suspicious for pneumonia.  This report was finalized on 10/12/2017 16:21 by  Rico Greenfield, .        My radiograph interpretation/independent review of imaging: I suspect she has significant atelectasis of the left lung as her x-ray does appear to show some air bronchograms.  This probably relates to her neurologic status and poor cough and she may be having episodes of aspiration.  Other test results (not lab  or imaging): 2-D echo did show some mild concentric left ventricular hypertrophy.  Independent review of ekg: Rhythm strip did appear to show sinus rhythm.  Patient Active Problem List   Diagnosis   • CVA (cerebral vascular accident)   • Oropharyngeal dysphagia     Pulmonary Assessment:  New problem (to me), with additional workup planned:   1.  Left mid to lower lung zone atelectasis.  She could have some coexisting pneumonia but she is afebrile and her white count is not elevated.  New problem (to me), no additional workup planned:   1.  History of traumatic brain injury.  2.  Possible superimposed stroke.  Other problems either stable, failing to improve or worsenin. Cognitive impairment due to #1 and #2.    Recommend/plan:   · I will place her on some mucolytic therapy and pulmonary toilet measures.  She is on a scopolamine patch and although this may help in drying up secretions and may also make it more difficult for her to cough these up and will consider discontinuing this but will defer this to the attending.  She can certainly be continued on antibiotics with my suspicion is that her chest x-ray represents probably atelectasis and I do think I can see her bronchograms.  Get a chest CT to assess for any possible coexisting effusion again will defer this to the attending.  And I think her problems with her left lung infiltrate related predominately to her underlying neurologic issues with poor cough and possible coexisting episodes of aspiration.    Electronically signed by Steve Friend MD, 10/13/17, 5:40 PM

## 2017-10-13 NOTE — PLAN OF CARE
Problem: Patient Care Overview (Adult)  Goal: Plan of Care Review  Outcome: Ongoing (interventions implemented as appropriate)    10/13/17 1218   Coping/Psychosocial Response Interventions   Plan Of Care Reviewed With patient;other (see comments)  (No evidence of learning.)   Patient Care Overview   Progress no change   Outcome Evaluation   Outcome Summary/Follow up Plan Pt was cued for two trials of 10 reps. With max cues, pt attempted /ah/ vocalizations x6, two of which were aphonic, yet appropriate articulatory posture and whisper noted. Pt often spontaneously attempted to complete coughs with weak strength. However, with max verbal cues, pt was able to increase strength of throat clear x1. Pt continues to present with inability to tolerate secretions, with congested, wet vocal quality noted, despite multiple attempts at coughing and throat clearing, each of which continue to be profoundly impaired. Therefore, pt not appropriate for PO trials. Pt was provided oral care via water moistened toothette and warm wash cloth. ST to continue to follow and tx. Continue with nutrition via G-tube.          Problem: Inpatient SLP  Goal: Dysphagia- Patient will improve swallowing skills to begin to take some PO safely  Outcome: Ongoing (interventions implemented as appropriate)    10/05/17 0910 10/13/17 1218   Begin to Take Some PO Safely   Begin to Take Some PO Safely- SLP, Date Established 10/05/17 --    Begin to Take Some PO Safely- SLP, Time to Achieve by discharge --    Begin to Take Some PO Safely- SLP, Activity Level Patient will improve oral skills for more efficient eating;Patient will improve timing of pharyngeal response for safer, more efficient swallow --    Begin to Take Some PO Safely- SLP, Additional Goal Pt will tolerate PO trials with SLP without s/s of aspiration --    Begin to Take Some PO Safely- SLP, Date Goal Reviewed --  10/13/17   Begin to Take Some PO Safely- SLP, Outcome goal ongoing --

## 2017-10-13 NOTE — PROGRESS NOTES
Neurology Progress Note      Date of admission: 10/4/2017  5:24 PM  Date of visit: 10/13/2017    Chief Complaint:  Strokes    Subjective     Subjective:  No issues overnight.  Her PEG is doing well.  She will likely be discharged tomorrow.  Medications:  Current Facility-Administered Medications   Medication Dose Route Frequency Provider Last Rate Last Dose   • acetaminophen (TYLENOL) suppository 650 mg  650 mg Rectal Q4H PRN Latrice Guillen MD   650 mg at 10/05/17 1416   • aspirin chewable tablet 81 mg  81 mg Oral Daily Albert Kraft MD   81 mg at 10/13/17 1009    Or   • aspirin suppository 300 mg  300 mg Rectal Daily Albert Kraft MD   300 mg at 10/10/17 1220   • baclofen (LIORESAL) tablet 10 mg  10 mg Oral Daily Dami Keane MD   10 mg at 10/13/17 1009   • bisacodyl (DULCOLAX) suppository 10 mg  10 mg Rectal Daily PRN Humberto Russell MD       • cefepime (MAXIPIME) 2 g/100 mL 0.9% NS (mbp)  2 g Intravenous Once Dami Keane MD       • cefepime (MAXIPIME) 2 g/100 mL 0.9% NS (mbp)  2 g Intravenous Q8H Dami Keane MD       • dantrolene (DANTRIUM) capsule 25 mg  25 mg Oral TID Dylan Johnson, DO   25 mg at 10/13/17 1009   • enoxaparin (LOVENOX) syringe 40 mg  40 mg Subcutaneous Daily Latrice Guillen MD   40 mg at 10/13/17 1010   • FLUoxetine (PROzac) capsule 10 mg  10 mg Oral Daily Dylan Johnson DO   10 mg at 10/13/17 1010   • furosemide (LASIX) tablet 20 mg  20 mg Per G Tube Daily Dylan Johnson DO   20 mg at 10/13/17 1010   • gabapentin (NEURONTIN) capsule 300 mg  300 mg Oral Q12H Dami Keane MD   300 mg at 10/13/17 1010   • glycopyrrolate (ROBINUL) tablet 1 mg  1 mg Per G Tube BID Dami Keane MD   1 mg at 10/13/17 1010   • HYDROmorphone (DILAUDID) injection 0.5 mg  0.5 mg Intravenous Q2H PRN Humberto Russell MD   0.5 mg at 10/11/17 0751   • iopamidol (ISOVUE-370) 76 % injection 150 mL  150 mL Intravenous Once in imaging Humberto Russell MD        • labetalol (NORMODYNE,TRANDATE) injection 10 mg  10 mg Intravenous Q10 Min PRN Albert Kraft MD       • levoFLOXacin (LEVAQUIN) 750 mg/150 mL D5W (premix) (LEVAQUIN) 750 mg  750 mg Intravenous Q24H Dami Keane MD       • LORazepam (ATIVAN) tablet 2 mg  2 mg Oral Nightly Dylan Johnson, DO   2 mg at 10/12/17 2123   • nystatin (MYCOSTATIN) powder   Topical Q12H Dylan Johnson DO   1 application at 10/13/17 1011   • ondansetron (ZOFRAN) injection 4 mg  4 mg Intravenous Q6H PRN Albert Kraft MD       • oxybutynin XL (DITROPAN-XL) 24 hr tablet 5 mg  5 mg Oral Daily Dylan Johnson DO   5 mg at 10/13/17 1009   • polyvinyl alcohol (LIQUIFILM) 1.4 % ophthalmic solution 2 drop  2 drop Both Eyes Nightly Dylan Johnson DO   2 drop at 10/12/17 2123   • predniSONE (DELTASONE) tablet 3 mg  3 mg Oral Daily Dylan Johnson DO   3 mg at 10/13/17 1009   • saccharomyces boulardii (FLORASTOR) capsule 250 mg  250 mg Per G Tube BID Dami Keane MD   250 mg at 10/13/17 1009   • Scopolamine (TRANSDERM-SCOP) 1.5 MG/3DAYS patch 2 patch  2 patch Transdermal Q72H Humberto Russell MD   2 patch at 10/12/17 1733   • senna (SENOKOT) tablet 17.2 mg  2 tablet Oral Daily PRN Dylan Johnson DO       • sodium chloride 0.9 % flush 1-10 mL  1-10 mL Intravenous PRN Latrice Guillen MD       • sodium chloride 0.9 % flush 1-10 mL  1-10 mL Intravenous PRN Albert Kraft MD           Review of Systems:   -A 14 point review of systems is completed and is difficult to obtain    Objective     Objective      Vital Signs  Temp:  [98.2 °F (36.8 °C)-98.5 °F (36.9 °C)] 98.4 °F (36.9 °C)  Heart Rate:  [74-84] 78  Resp:  [16-20] 18  BP: (130-143)/(65-85) 130/78    Physical Exam:    HEENT:  Neck supple  CVS:  Regular rate and rhythm.  No murmurs  Carotid Examination:  No bruits  Lungs:  Clear to auscultation  Abdomen:  Non-tender, Non-distended  Extremities:  No signs of peripheral edema    Neurologic  Exam:    -Awake, Alert, Very dysarthric  -Not following commands    Cranial nerves II through XII about the same.    Motor: Moving right upper and right lower extremity well.  Not moving her left upper extremity.  --contractures of left upper extremity    Results Review:    I reviewed the patient's new clinical results.    Lab Results (last 24 hours)     ** No results found for the last 24 hours. **        Imaging Results (last 24 hours)     Procedure Component Value Units Date/Time    XR Chest 1 View [[id number]] Collected:  10/12/17 1618     Updated:  10/12/17 1625    Narrative:       History:  36-year-old evaluated to follow-up infiltrate.      Reference:  Chest radiograph 10/04/2017.     Findings:  Frontal chest radiograph performed.     There is extensive consolidation involving the left lower lobe and  portions of the left upper lobe. Appearance is worsened from 8 days  previous. Hazy right perihilar opacities are similar to prior exam.     Again noted multiple acute right-sided rib fractures. There is widening  of the right lateral pleural stripe suggesting very mild pleural fluid  accumulation. This may be related to the rib fractures. No pneumothorax.          Impression:       Impression:  Since 8 days previous, worsened consolidation throughout the left lung  suspicious for pneumonia.  This report was finalized on 10/12/2017 16:21 by  Rico Greenfield, .        Echocardiogram:   · Left ventricular wall thickness is consistent with mild concentric hypertrophy.  · Left ventricular systolic function is normal. Estimated EF appears to be in the range of 61 - 65%.  · No evidence of a patent foramen ovale.  · No significant valvular abnormalities are identified.    CTA of neck reviewed showing no significant stenosis  Assessment/Plan     Hospital Problem List    Principal Problem:    Oropharyngeal dysphagia  Active Problems:    CVA (cerebral vascular accident)    Impression:  1. Poor quality MRI of brain at outside  hospital-due to motion artifact-- c/w acute ischemic strokes in the  left subcortical region that is small  right posterior internal capsule, right posterior mid brain, and right monika.  2. Left internal carotid artery stenosis at 50 to 69%.    Plan:  Continue aspirin and Lipitor  Smoking cessation counseling when she is able to comprehend.   Continue PT/OT  PEG in place and working  Plans to discharge to [facility] letty rKaft MD  10/13/17  2:53 PM

## 2017-10-13 NOTE — PROGRESS NOTES
Continued Stay Note  [facility]     Patient Name: Pallavi Yee  MRN: [id number]  Today's Date: 10/13/2017    Admit Date: 10/4/2017          Discharge Plan       10/13/17 1108    Case Management/Social Work Plan    Plan [facility]     Additional Comments Ingrid at [facility] has called JIHAN to inform that patient has been accepted and still has bed available, but will need to admit to their facility on Monday, 10/16 (instead of today). JIHAN updated Dr. Keane. JIHAN called [facility] and spoke to [facility] in dispatch to arrange transport. [facility] has advised that patient will need to be ready to be transported at 8AM. If this absolutely cannot happen, please call [facility] at [phone]. Otherwise, [facility] has been arranged to transport patient on Monday. JIHAN did fax facesheet to [facility] at [phone]. Discharge summary, discharge med list, and a copy of any scripts to be faxed to facility at [phone]. Patient's nurse will call report to [phone]. Admissions coordinator's name is Ingrid, her direct number is [phone].              Discharge Codes     None            IGNACIO HayesW

## 2017-10-13 NOTE — PLAN OF CARE
Problem: Patient Care Overview (Adult)  Goal: Plan of Care Review  Outcome: Ongoing (interventions implemented as appropriate)    10/13/17 0421   Coping/Psychosocial Response Interventions   Plan Of Care Reviewed With patient   Patient Care Overview   Progress no change   Outcome Evaluation   Outcome Summary/Follow up Plan Patient is calm and cooperative tonight, and trying to communicate without screaming. Speech still garbled. Turn Q2, no skin breakdown. Tolerating TF @goal of 55. Free from falls.          Problem: Stroke (Ischemic) (Adult)  Goal: Signs and Symptoms of Listed Potential Problems Will be Absent or Manageable (Stroke)  Outcome: Ongoing (interventions implemented as appropriate)    Problem: Fall Risk (Adult)  Goal: Absence of Falls  Outcome: Ongoing (interventions implemented as appropriate)    Problem: Pressure Ulcer Risk (Chuy Scale) (Adult,Obstetrics,Pediatric)  Goal: Skin Integrity  Outcome: Ongoing (interventions implemented as appropriate)    Problem: Nutrition, Enteral (Adult)  Goal: Signs and Symptoms of Listed Potential Problems Will be Absent or Manageable (Nutrition, Enteral)  Outcome: Ongoing (interventions implemented as appropriate)

## 2017-10-13 NOTE — THERAPY TREATMENT NOTE
Acute Care - Speech Language Pathology   Swallow Treatment Note [facility]     Patient Name: Pallavi Yee  : 1980  MRN: [id number]  Today's Date: 10/13/2017  Onset of Illness/Injury or Date of Surgery Date: 10/04/17     Referring Physician: Emelina      Admit Date: 10/4/2017   Pt was cued for two trials of 10 reps. With max cues, pt attempted /ah/ vocalizations x6, two of which were aphonic, yet appropriate articulatory posture and whisper noted. Pt often spontaneously attempted to complete coughs with weak strength. However, with max verbal cues, pt was able to increase strength of throat clear x1. Pt continues to present with inability to tolerate secretions, with congested, wet vocal quality noted, despite multiple attempts at coughing and throat clearing, each of which continue to be profoundly impaired. Therefore, pt not appropriate for PO trials. Pt was provided oral care via water moistened toothette and warm wash cloth. ST to continue to follow and tx. Continue with nutrition via G-tube. Thanks! Fatuma Leger, GRETEL-SLP 10/13/2017 12:21 PM  Visit Dx:      ICD-10-CM ICD-9-CM   1. Oropharyngeal dysphagia R13.12 787.22   2. Mobility impaired Z74.09 799.89   3. Impaired mobility and ADLs Z74.09 799.89     Patient Active Problem List   Diagnosis   • CVA (cerebral vascular accident)   • Oropharyngeal dysphagia             Adult Rehabilitation Note       10/13/17 1035 10/12/17 1414 10/12/17 0911    Rehab Assessment/Intervention    Discipline speech language pathologist  -TM occupational therapy assistant  -TS occupational therapy assistant  -TS    Document Type therapy note (daily note)  -TM --  -TS --  -TS    Subjective Information --   Unable to functionally verbalize during tx, cooperative   -TM      Patient Effort, Rehab Treatment fair  -TM      Treatment Not Performed  other (see comments)  -TS other (see comments)  -TS    Treatment Not Performed, Comment  Pt still sleeping. Per RN pt had ativan and has  been asleep all day and has not been rousing easily. Pt scheduled to dc to SNF 10/13.   -TS Pt sleeping, will check back   -TS    Recorded by [TM] YVETTE Traore [TS] KAREEM Luke/L [TS] KAREEM Luke/L    Dysphagia Treatment Objectives and Progress    Dysphagia Treatment Objectives Improve laryngeal closure;Improve laryngeal elevation  -TM      Recorded by [TM] TINA TraoreSLP      Improve laryngeal closure    To improve laryngeal closure, patient will: 70%;with inconsistent cues   Repeat vowel   -TM      Status: Improve laryngeal closure New  -TM      Laryngeal Closure Progress 10%;with consistent cues  -TM      Comments: Improve laryngeal closure Pt was cued for two trials of 10 reps. With max cues, pt attempted /ah/ vocalizations x6, two of which were aphonic, yet appropriate articulatory posture and whisper noted. Pt often spontaneously attempted to complete coughs with weak strength. However, with max verbal cues, pt was able to increase strength of throat clear x1.   -TM      Recorded by [TM] YVETTE Traore      Improve laryngeal elevation    To improve laryngeal elevation, patient will: Complete pitch-glide;70%;with inconsistent cues  -TM      Status: Improve laryngeal elevation New  -TM      Laryngeal Elevation Progress 10%  -TM      Comments: Improve laryngeal elevation Pt was able to complete three /ah/ vocalizations with two second duration with max cues and modeling provided per SLP.   -TM      Recorded by [TM] YVETTE Traore        10/11/17 1348 10/11/17 1344 10/11/17 1000    Rehab Assessment/Intervention    Discipline occupational therapist  -MM --  -MM speech language pathologist  -KW    Document Type   therapy note (daily note)  -KW    Subjective Information agree to therapy;no complaints  -MM      Patient Effort, Rehab Treatment   poor  -KW    Precautions/Limitations fall precautions  -MM      Recorded by [MM] Sean Ambriz OTR/L [MM]  NAHOMI Snow/YEN [KW] Malika Leong MS CCC-SLP    Pain Assessment    Pain Assessment No/denies pain  -MM      Recorded by [MM] NAHOMI Snow/L      Cognitive Assessment/Intervention    Current Cognitive/Communication Assessment impaired  -MM      Recorded by [MM] NAHOMI Snow/L      Dysphagia/Swallow Intervention    Dysphagia/Swallow Therapeutic Feed   Patient agitated, asking for Coke.  Explained why patient could not have Coke and attempted to give ice chip but patient hit at SLP and knocked spoon away.  Discontinued therapy for day due to patient's behavior.  -KW    Recorded by   [KW] Malika Leong MS CCC-SLP    Bed Mobility, Assessment/Treatment    Bed Mobility, Roll Left, [place] maximum assist (25% patient effort);dependent (less than 25% patient effort);2 person assist required;verbal cues required  -MM      Bed Mobility, Roll Right, [place] maximum assist (25% patient effort);dependent (less than 25% patient effort);2 person assist required;verbal cues required  -MM      Bed Mobility, Impairments strength decreased;muscle tone abnormal;coordination impaired  -MM      Recorded by [MM] NAHOMI Snow/L      Toileting Assessment/Training    Toileting Assess/Train, Position supine  -MM      Toileting Assess/Train, Indepen Level dependent (less than 25% patient effort);2 person assist required;verbal cues required  -MM      Toileting Assess/Train, Impairments muscle tone abnormal;decreased flexibility;impaired balance;coordination impaired;strength decreased;motor control impaired  -MM      Recorded by [MM] NAHOMI Snow/L      Therapy Exercises    Left Upper Extremity PROM:;5 reps;10 reps;supine;elbow flexion/extension;hand pumps;shoulder extension/flexion   wrist flex/ext  -MM      Recorded by [MM] NAHOMI Snow/L      Positioning and Restraints    Pre-Treatment Position in bed  -MM      Post Treatment Position bed  -MM      In Bed supine;call light  within reach;encouraged to call for assist;side rails up x2  -MM      Recorded by [MM] Sean Ambriz, OTR/L        User Key  (r) = Recorded By, (t) = Taken By, (c) = Cosigned By    Initials Name Effective Dates    TM Fatuma Leger, CCC-SLP 08/02/16 -     TS Barbara Ramirez, SERNA/L 08/02/16 -     KW Malika Leong, MS CCC-SLP 08/02/16 -     MM Sean Ambriz, OTR/L 10/21/16 -                   IP SLP Goals       10/13/17 1218 10/11/17 1253 10/06/17 1135    Begin to Take Some PO Safely    Begin to Take Some PO Safely- SLP, Date Goal Reviewed 10/13/17  -TM 10/11/17  -KW 10/06/17  -TM      10/05/17 0910          Begin to Take Some PO Safely    Begin to Take Some PO Safely- SLP, Date Established 10/05/17  -BN      Begin to Take Some PO Safely- SLP, Time to Achieve by discharge  -BN      Begin to Take Some PO Safely- SLP, Activity Level Patient will improve oral skills for more efficient eating;Patient will improve timing of pharyngeal response for safer, more efficient swallow  -BN      Begin to Take Some PO Safely- SLP, Additional Goal Pt will tolerate PO trials with SLP without s/s of aspiration  -BN      Begin to Take Some PO Safely- SLP, Date Goal Reviewed 10/05/17  -BN      Begin to Take Some PO Safely- SLP, Outcome goal ongoing  -BN        User Key  (r) = Recorded By, (t) = Taken By, (c) = Cosigned By    Initials Name Provider Type     Fatuma Leger, CCC-SLP Speech and Language Pathologist    KW Malika Leong, MS CCC-SLP Speech and Language Pathologist    JOHN Aguirre, CCC-SLP Speech and Language Pathologist          EDUCATION  The patient has been educated in the following areas:   Dysphagia (Swallowing Impairment).    SLP Recommendation and Plan                                           Plan of Care Review  Plan Of Care Reviewed With: patient, other (see comments) (No evidence of learning.)  Progress: no change  Outcome Summary/Follow up Plan: Pt was cued for two trials of 10 reps. With  max cues, pt attempted /ah/ vocalizations x6, two of which were aphonic, yet appropriate articulatory posture and whisper noted. Pt often spontaneously attempted to complete coughs with weak strength. However, with max verbal cues, pt was able to increase strength of throat clear x1. Pt continues to present with inability to tolerate secretions, with congested, wet vocal quality noted, despite multiple attempts at coughing and throat clearing, each of which continue to be profoundly impaired. Therefore, pt not appropriate for PO trials. Pt was provided oral care via water moistened toothette and warm wash cloth. ST to continue to follow and tx. Continue with nutrition via G-tube.            Time Calculation:         Time Calculation- SLP       10/13/17 1220          Time Calculation- SLP    SLP Start Time 1035  -TM      SLP Stop Time 1100  -      SLP Time Calculation (min) 25 min  -      SLP Received On 10/13/17  -        User Key  (r) = Recorded By, (t) = Taken By, (c) = Cosigned By    Initials Name Provider Type     Fatuma Leger CCC-SLP Speech and Language Pathologist          Therapy Charges for Today     Code Description Service Date Service Provider Modifiers Qty    [id number]  ST TREATMENT SWALLOW 2 10/13/2017 YVETTE Traore GN, KX 1          SLP G-Codes  SLP NOMS Used?: Yes  Functional Limitations: Swallowing  Swallow Current Status (): At least 80 percent but less than 100 percent impaired, limited or restricted  Swallow Goal Status (): At least 60 percent but less than 80 percent impaired, limited or restricted      YVETTE Hart  10/13/2017

## 2017-10-13 NOTE — PLAN OF CARE
Problem: Patient Care Overview (Adult)  Goal: Plan of Care Review  Outcome: Ongoing (interventions implemented as appropriate)    10/13/17 1422   Coping/Psychosocial Response Interventions   Plan Of Care Reviewed With patient   Patient Care Overview   Progress no change   Outcome Evaluation   Outcome Summary/Follow up Plan PROM completed with LUE while pt in bed in fowlers. Pt family agreed to tx. Continue OT POC

## 2017-10-13 NOTE — PROGRESS NOTES
[facility]  INPATIENT PROGRESS NOTE    Length of Stay: 9  Date of Admission: 10/4/2017  Primary Care Physician: ROBERTH Beckham    Subjective   Chief Complaint: Follow-up stroke; aspiration PNA  HPI   Patient more alert today than yesterday.  Sitting up in bed and resting comfortably.  Has a lot of respiratory secretions and frequently tries to cough, but has some difficulty expectorating sputum.  Nontoxic appearing at this time.  Additional history difficult to obtain from patient.    Review of Systems     All pertinent negatives and positives are as above. All other systems have been reviewed and are negative unless otherwise stated.     Objective    Temp:  [98.2 °F (36.8 °C)-98.5 °F (36.9 °C)] 98.4 °F (36.9 °C)  Heart Rate:  [74-84] 78  Resp:  [16-20] 18  BP: (130-143)/(65-85) 130/78  Physical Exam   Constitutional: No distress.   More awake and alert today than yesterday   HENT:   Head: Normocephalic.   Mouth/Throat: No oropharyngeal exudate (lots of oral secretions).   Quite a bit of oral, upper respiratory secretions   Eyes: Pupils are equal, round, and reactive to light. No scleral icterus.   Neck: No tracheal deviation present.   Cardiovascular: Normal rate and regular rhythm.    Pulmonary/Chest: Effort normal. No respiratory distress.   Coarse BSs on the left with diminished BSs at left base   Abdominal: Soft.   PEG in place   Neurological: She is alert.   Skin: Skin is warm and dry. She is not diaphoretic.   Vitals reviewed.      Results Review:  I have reviewed the labs, radiology results, and diagnostic studies.    Laboratory Data:     Results from last 7 days  Lab Units 10/12/17  0903 10/11/17  0722 10/10/17  0513   WBC 10*3/mm3 7.18 9.40 6.86   HEMOGLOBIN g/dL 11.4* 11.9* 11.3*   HEMATOCRIT % 35.4* 36.5* 34.6*   PLATELETS 10*3/mm3 198 204 196          Results from last 7 days  Lab Units 10/12/17  0903 10/11/17  0722 10/10/17  0513   SODIUM  mmol/L 140 141 138   POTASSIUM mmol/L 3.5 3.6 3.5   CHLORIDE mmol/L 105 105 105   CO2 mmol/L 26.0 24.0 23.0*   BUN mg/dL 7 9 7   CREATININE mg/dL 0.43* 0.54 0.47*   CALCIUM mg/dL 9.1 8.8 8.7   GLUCOSE mg/dL 108* 112* 97       Culture Data:        Radiology Data:   Imaging Results (last 24 hours)     Procedure Component Value Units Date/Time    XR Chest 1 View [[id number]] Collected:  10/12/17 1618     Updated:  10/12/17 1625    Narrative:       History:  36-year-old evaluated to follow-up infiltrate.      Reference:  Chest radiograph 10/04/2017.     Findings:  Frontal chest radiograph performed.     There is extensive consolidation involving the left lower lobe and  portions of the left upper lobe. Appearance is worsened from 8 days  previous. Hazy right perihilar opacities are similar to prior exam.     Again noted multiple acute right-sided rib fractures. There is widening  of the right lateral pleural stripe suggesting very mild pleural fluid  accumulation. This may be related to the rib fractures. No pneumothorax.          Impression:       Impression:  Since 8 days previous, worsened consolidation throughout the left lung  suspicious for pneumonia.  This report was finalized on 10/12/2017 16:21 by  Rico Greenfield, .          I have reviewed the patient current medications.     Assessment/Plan     Hospital Problem List     * (Principal)Oropharyngeal dysphagia    Overview Signed 10/10/2017  7:34 AM by Josefina Barry MD     Added automatically from request for surgery [id number]         CVA (cerebral vascular accident)        Assessment:  1.  Multiple strokes in the left subcortical region  2.  History of traumatic brain injury  3.  Dysphagia requiring PEG placement  4.  Dysarthria  5.  Aspiration pneumonia - worsening left sided infiltrates/consolidation  6.  Right sided rib fractures    Plan:  1.  D/C Clindamycin  2.  Start Cefepime and Levaquin; patient has Vanco allergy  3.  Respiratory culture - may have to obtain  sample via NT suction  4.  Repeat CXR looks worse with more significant left sided consolidation  5.  Pulmonary consult  6.  Scopolamine patch; add trial of Glycopyrrolate for upper respiratory secretions  7.  TFs with Jevity  8.  Add probiotic  9.  ASA therapy  10.  PT/OT/ST  11.  Dispo: [facility] at discharge  12.  Continue to try to limit sedating meds and follow along closely  13.  Add nebs (scheduled)    Dami Keane MD   10/13/17   2:50 PM

## 2017-10-13 NOTE — DISCHARGE PLACEMENT REQUEST
"Going to a facility for mentally disabled and medically complex patients. Plan transport Monday, 10/16    [address] [address]  [phone]    Call Back: [facility] [phone]    Rayo Leblanc (36 y.o. Female)     Date of Birth Social Security Number Address Home Phone MRN    1980  [address] [place] [id number] [phone] [id number]    [employer] Marital Status          None Single       Admission Date Admission Type Admitting Provider Attending Provider Department, Room/Bed    10/4/17 Urgent Dami Keane MD Thompson, Benjamin H, MD [facility] 3A, 356/1    Discharge Date Discharge Disposition Discharge Destination                      Attending Provider: Dami Keane MD     Allergies:  Adhesive Tape, Latex, Meperidine, Other, Vancomycin, Morphine    Isolation:  None   Infection:  None   Code Status:  FULL    Ht:  67\" (170.2 cm)   Wt:  164 lb 3 oz (74.5 kg)    Admission Cmt:  None   Principal Problem:  Oropharyngeal dysphagia [R13.12] More...                 Active Insurance as of 10/4/2017     Primary Coverage     Payor Plan Insurance Group Employer/Plan Group    MEDICARE MEDICARE A & B      Payor Plan Address Payor Plan Phone Number Effective From Effective To    [address] [phone] 6/1/1999     [place] [address]       Subscriber Name Subscriber Birth Date Member ID       RAYO LEBLANC 1980 [id number]                 Emergency Contacts      (Rel.) Home Phone Work Phone Mobile Phone    Josefina Leblanc (Mother) -- -- [phone]            Insurance Information                MEDICARE/MEDICARE A & B Phone: [phone]    Subscriber: NedinaDavid mitchellmuriel Subscriber#: [id number]    Group#:  Precert#:           "

## 2017-10-13 NOTE — TELEPHONE ENCOUNTER
Pt is being discharged to a nursing home and the nursing home director wants to know the size of the peg tube that you placed??    I looked everywhere in this chart and couldn't find that information.    [Mireya/Ingrid [phone]]

## 2017-10-14 ENCOUNTER — APPOINTMENT (OUTPATIENT)
Dept: GENERAL RADIOLOGY | Facility: HOSPITAL | Age: 37
End: 2017-10-14

## 2017-10-14 LAB
ALBUMIN SERPL-MCNC: 3.4 G/DL (ref 3.5–5)
ALBUMIN/GLOB SERPL: 0.8 G/DL (ref 1.1–2.5)
ALP SERPL-CCNC: 103 U/L (ref 24–120)
ALT SERPL W P-5'-P-CCNC: 31 U/L (ref 0–54)
ANION GAP SERPL CALCULATED.3IONS-SCNC: 11 MMOL/L (ref 4–13)
AST SERPL-CCNC: 30 U/L (ref 7–45)
BILIRUB SERPL-MCNC: 0.2 MG/DL (ref 0.1–1)
BUN BLD-MCNC: 12 MG/DL (ref 5–21)
BUN/CREAT SERPL: 20.7 (ref 7–25)
CALCIUM SPEC-SCNC: 9.6 MG/DL (ref 8.4–10.4)
CHLORIDE SERPL-SCNC: 99 MMOL/L (ref 98–110)
CO2 SERPL-SCNC: 28 MMOL/L (ref 24–31)
CREAT BLD-MCNC: 0.58 MG/DL (ref 0.5–1.4)
DEPRECATED RDW RBC AUTO: 53.3 FL (ref 40–54)
ERYTHROCYTE [DISTWIDTH] IN BLOOD BY AUTOMATED COUNT: 15.2 % (ref 12–15)
GFR SERPL CREATININE-BSD FRML MDRD: 116 ML/MIN/1.73
GLOBULIN UR ELPH-MCNC: 4.3 GM/DL
GLUCOSE BLD-MCNC: 118 MG/DL (ref 70–100)
HCT VFR BLD AUTO: 37.1 % (ref 37–47)
HGB BLD-MCNC: 12 G/DL (ref 12–16)
MCH RBC QN AUTO: 31.2 PG (ref 28–32)
MCHC RBC AUTO-ENTMCNC: 32.3 G/DL (ref 33–36)
MCV RBC AUTO: 96.4 FL (ref 82–98)
PLATELET # BLD AUTO: 193 10*3/MM3 (ref 130–400)
PMV BLD AUTO: 12.2 FL (ref 6–12)
POTASSIUM BLD-SCNC: 4 MMOL/L (ref 3.5–5.3)
PROT SERPL-MCNC: 7.7 G/DL (ref 6.3–8.7)
RBC # BLD AUTO: 3.85 10*6/MM3 (ref 4.2–5.4)
SODIUM BLD-SCNC: 138 MMOL/L (ref 135–145)
WBC NRBC COR # BLD: 8.73 10*3/MM3 (ref 4.8–10.8)

## 2017-10-14 PROCEDURE — 80053 COMPREHEN METABOLIC PANEL: CPT | Performed by: INTERNAL MEDICINE

## 2017-10-14 PROCEDURE — 85027 COMPLETE CBC AUTOMATED: CPT | Performed by: INTERNAL MEDICINE

## 2017-10-14 PROCEDURE — 25010000002 ENOXAPARIN PER 10 MG: Performed by: FAMILY MEDICINE

## 2017-10-14 PROCEDURE — 94669 MECHANICAL CHEST WALL OSCILL: CPT

## 2017-10-14 PROCEDURE — 94760 N-INVAS EAR/PLS OXIMETRY 1: CPT

## 2017-10-14 PROCEDURE — 94799 UNLISTED PULMONARY SVC/PX: CPT

## 2017-10-14 PROCEDURE — 71010 HC CHEST PA OR AP: CPT

## 2017-10-14 PROCEDURE — 63710000001 PREDNISONE PER 5 MG: Performed by: INTERNAL MEDICINE

## 2017-10-14 PROCEDURE — 25010000002 CEFEPIME: Performed by: INTERNAL MEDICINE

## 2017-10-14 PROCEDURE — 25010000002 MEROPENEM: Performed by: INTERNAL MEDICINE

## 2017-10-14 RX ADMIN — DANTROLENE SODIUM 25 MG: 25 CAPSULE ORAL at 22:37

## 2017-10-14 RX ADMIN — DANTROLENE SODIUM 25 MG: 25 CAPSULE ORAL at 08:39

## 2017-10-14 RX ADMIN — PREDNISONE 3 MG: 1 TABLET ORAL at 08:39

## 2017-10-14 RX ADMIN — POLYVINYL ALCOHOL 2 DROP: 14 SOLUTION/ DROPS OPHTHALMIC at 22:39

## 2017-10-14 RX ADMIN — NYSTATIN: 100000 POWDER TOPICAL at 22:39

## 2017-10-14 RX ADMIN — IPRATROPIUM BROMIDE AND ALBUTEROL SULFATE 3 ML: 2.5; .5 SOLUTION RESPIRATORY (INHALATION) at 07:36

## 2017-10-14 RX ADMIN — IPRATROPIUM BROMIDE AND ALBUTEROL SULFATE 3 ML: 2.5; .5 SOLUTION RESPIRATORY (INHALATION) at 19:37

## 2017-10-14 RX ADMIN — Medication 250 MG: at 17:37

## 2017-10-14 RX ADMIN — NYSTATIN: 100000 POWDER TOPICAL at 08:40

## 2017-10-14 RX ADMIN — IPRATROPIUM BROMIDE AND ALBUTEROL SULFATE 3 ML: 2.5; .5 SOLUTION RESPIRATORY (INHALATION) at 10:46

## 2017-10-14 RX ADMIN — Medication 250 MG: at 08:39

## 2017-10-14 RX ADMIN — ENOXAPARIN SODIUM 40 MG: 40 INJECTION SUBCUTANEOUS at 08:36

## 2017-10-14 RX ADMIN — GLYCOPYRROLATE 1 MG: 1 TABLET ORAL at 08:40

## 2017-10-14 RX ADMIN — FUROSEMIDE 20 MG: 20 TABLET ORAL at 08:36

## 2017-10-14 RX ADMIN — CEFEPIME 2 G: 2 INJECTION, POWDER, FOR SOLUTION INTRAVENOUS at 08:28

## 2017-10-14 RX ADMIN — FLUOXETINE 10 MG: 10 CAPSULE ORAL at 08:39

## 2017-10-14 RX ADMIN — MEROPENEM 1 G: 1 INJECTION, POWDER, FOR SOLUTION INTRAVENOUS at 22:37

## 2017-10-14 RX ADMIN — GABAPENTIN 300 MG: 300 CAPSULE ORAL at 22:39

## 2017-10-14 RX ADMIN — OXYBUTYNIN CHLORIDE 5 MG: 5 TABLET, FILM COATED, EXTENDED RELEASE ORAL at 08:39

## 2017-10-14 RX ADMIN — BACLOFEN 10 MG: 10 TABLET ORAL at 08:40

## 2017-10-14 RX ADMIN — DANTROLENE SODIUM 25 MG: 25 CAPSULE ORAL at 17:37

## 2017-10-14 RX ADMIN — IPRATROPIUM BROMIDE AND ALBUTEROL SULFATE 3 ML: 2.5; .5 SOLUTION RESPIRATORY (INHALATION) at 14:55

## 2017-10-14 RX ADMIN — Medication 81 MG: at 08:39

## 2017-10-14 RX ADMIN — MEROPENEM 1 G: 1 INJECTION, POWDER, FOR SOLUTION INTRAVENOUS at 17:37

## 2017-10-14 RX ADMIN — GABAPENTIN 300 MG: 300 CAPSULE ORAL at 08:36

## 2017-10-14 RX ADMIN — ACETYLCYSTEINE 1.5 ML: 200 SOLUTION ORAL; RESPIRATORY (INHALATION) at 19:37

## 2017-10-14 RX ADMIN — ACETYLCYSTEINE 1.5 ML: 200 SOLUTION ORAL; RESPIRATORY (INHALATION) at 07:36

## 2017-10-14 NOTE — PROGRESS NOTES
Pharmacy Dosing Service  Antimicrobials  Meropenem    Assessment/Action/Plan:  Pharmacy to dose meropenem for pneumonia.  Will initiate extended-infusion meropenem 1 gm IV every 8 hours.  Pharmacy will continue to monitor and adjust dose accordingly.     Subjective:  Pallavi Yee is a 36 y.o. female currently being treated for Pna.    Objective:  Estimated Creatinine Clearance: 138.7 mL/min (by C-G formula based on Cr of 0.58).   Lab Results   Component Value Date    CREATININE 0.58 10/14/2017    CREATININE 0.43 (L) 10/12/2017    CREATININE 0.54 10/11/2017     Lab Results   Component Value Date    WBC 8.73 10/14/2017     Temp Readings from Last 1 Encounters:   10/14/17 99.6 °F (37.6 °C) (Axillary)       Culture Results:  Microbiology Results (last 10 days)       Procedure Component Value - Date/Time      Respiratory Culture - Sputum, NT Suction [[id number]]  (Abnormal) Collected:  10/13/17 1556    Lab Status:  Preliminary result Specimen:  Sputum from NT Suction Updated:  10/14/17 1322     Respiratory Culture --      Light growth (2+) Normal Respiratory Jens      Light growth (2+) Non-Lactose  (A)     Gram Stain Result Greater than 25 WBCs per low power field      Moderate (3+) Epithelial cells seen      Moderate (3+) Mixed gram positive jens      Few (2+) Gram negative bacilli      Few (2+) Budding yeast    Wound Culture - Wound, Arm, Right [[id number]]  (Normal) Collected:  10/05/17 0000    Lab Status:  Final result Specimen:  Wound from Arm, Right Updated:  10/10/17 0744     Wound Culture No growth at 5 days     Gram Stain Result No WBCs or organisms seen    Urine Culture - Urine, Urine, Clean Catch [[id number]]  (Normal) Collected:  10/04/17 2353    Lab Status:  Final result Specimen:  Urine from Urine, Catheter Updated:  10/07/17 0728     Urine Culture No growth at 2 days    Blood Culture With ALECIA - Blood, [[id number]]  (Normal) Collected:  10/04/17 1950    Lab Status:  Final result Specimen:  Blood  from Hand, Left Updated:  10/09/17 2016     Blood Culture No growth at 5 days    Blood Culture With ALECIA - Blood, [[id number]]  (Normal) Collected:  10/04/17 1930    Lab Status:  Final result Specimen:  Blood from Arm, Left Updated:  10/09/17 2016     Blood Culture No growth at 5 days    Respiratory Culture - Sputum, Cough [[id number]]  (Abnormal) Collected:  10/04/17 1921    Lab Status:  Final result Specimen:  Sputum from Cough Updated:  10/06/17 0621     Respiratory Culture --      Moderate growth (3+) Normal Respiratory Jens      Light growth (2+) Yeast isolated (A)     Gram Stain Result Greater than 25 WBCs per low power field      Moderate (3+) Epithelial cells per low power field      Few (2+) Mixed gram positive jens      Few (2+) Budding yeast          Current Antimicrobials:   IV Anti-Infectives       Ordered     Dose/Rate Route Frequency Start Stop      10/14/17 1359  meropenem (MERREM) 1 g/100 mL 0.9% NS VTB (mbp)     Ordering Provider:  Dami Keane MD    1 g  over 3 Hours Intravenous Every 8 Hours 10/14/17 2300      10/14/17 1359  meropenem (MERREM) 1 g/100 mL 0.9% NS VTB (mbp)     Ordering Provider:  Dami Keane MD    1 g  over 30 Minutes Intravenous Once 10/14/17 1500      10/14/17 1342  Pharmacy to Dose meropenem (MERREM)     Ordering Provider:  Dami Keane MD     Does not apply Continuous PRN 10/14/17 1341      10/10/17 1136  CeFAZolin in D5W (ANCEF) IVPB 1 g     Ordering Provider:  Josefina Barry MD    1 g  over 30 Minutes Intravenous Once 10/10/17 1600 10/10/17 1638    10/10/17 0939  ceFAZolin (ANCEF) IVPB (duplex) 1 g     Ordering Provider:  Josefina Barry MD    1 g  over 30 Minutes Intravenous Once 10/10/17 1015 10/10/17 1020            Cassia Escalante [facility]  10/14/17 2:00 PM

## 2017-10-14 NOTE — PROGRESS NOTES
[facility] Medicine Services  INPATIENT PROGRESS NOTE    Length of Stay: 10  Date of Admission: 10/4/2017  Primary Care Physician: ROBERTH Beckham    Subjective   Chief Complaint: Follow-up stroke; aspiration PNA  HPI   Patient continues to try to cough, but appears to have difficulty expectorating any sputum.  Seems to be tolerating her tube feeds.  She is alert, but unable to provide much additional history at this time.  No acute events overnight.    Review of Systems     All pertinent negatives and positives are as above. All other systems have been reviewed and are negative unless otherwise stated.     Objective    Temp:  [97.8 °F (36.6 °C)-99.4 °F (37.4 °C)] 98.8 °F (37.1 °C)  Heart Rate:  [74-88] 84  Resp:  [18-20] 18  BP: (116-152)/(78-90) 133/78  Physical Exam   Constitutional: No distress.   Alert   HENT:   Head: Normocephalic.   Mouth/Throat: No oropharyngeal exudate (lots of oral secretions).   Quite a bit of oral, upper respiratory secretions   Eyes: Pupils are equal, round, and reactive to light. No scleral icterus.   Neck: No tracheal deviation present.   Cardiovascular: Normal rate and regular rhythm.    Pulmonary/Chest: Effort normal. No respiratory distress.   Coarse BSs on the left with diminished BSs at left base   Abdominal: Soft.   PEG in place   Neurological: She is alert.   Skin: Skin is warm and dry. She is not diaphoretic.   Vitals reviewed.      Results Review:  I have reviewed the labs, radiology results, and diagnostic studies.    Laboratory Data:     Results from last 7 days  Lab Units 10/14/17  0708 10/12/17  0903 10/11/17  0722   WBC 10*3/mm3 8.73 7.18 9.40   HEMOGLOBIN g/dL 12.0 11.4* 11.9*   HEMATOCRIT % 37.1 35.4* 36.5*   PLATELETS 10*3/mm3 193 198 204          Results from last 7 days  Lab Units 10/14/17  0708 10/12/17  0903 10/11/17  0722   SODIUM mmol/L 138 140 141   POTASSIUM mmol/L 4.0 3.5 3.6   CHLORIDE mmol/L 99 105 105   CO2 mmol/L  28.0 26.0 24.0   BUN mg/dL 12 7 9   CREATININE mg/dL 0.58 0.43* 0.54   CALCIUM mg/dL 9.6 9.1 8.8   BILIRUBIN mg/dL 0.2  --   --    ALK PHOS U/L 103  --   --    ALT (SGPT) U/L 31  --   --    AST (SGOT) U/L 30  --   --    GLUCOSE mg/dL 118* 108* 112*       Culture Data:        Radiology Data:   Imaging Results (last 24 hours)     ** No results found for the last 24 hours. **          I have reviewed the patient current medications.     Assessment/Plan     Hospital Problem List     * (Principal)Oropharyngeal dysphagia    Overview Signed 10/10/2017  7:34 AM by Josefina Barry MD     Added automatically from request for surgery [id number]         CVA (cerebral vascular accident)        Assessment:  1.  Multiple strokes in the left subcortical region  2.  History of traumatic brain injury  3.  Dysphagia requiring PEG placement  4.  Dysarthria  5.  Aspiration pneumonia - worsening left sided infiltrates/consolidation  6.  Right sided rib fractures    Plan:  1.  Clindamycin dc'd yesterday and started on IV Cefepime and Levaquin (day 2 today); patient has Vanco allergy  2.  Follow-up new respiratory culture   3.  Repeat CXR   4.  Appreciate Pulmonary input; will go ahead and d/c Scopolamine  5.  TFs with Jevity  6.  Probiotic  7.  ASA therapy  8.  PT/OT/ST  9.  Dispo: [facility] at discharge  10.  Continue to try to limit sedating meds and follow along closely  11.  Add nebs (scheduled) including Mucomyst    Dami Keane MD   10/14/17   11:30 AM     Addendum: patient developed a rash on her upper and lower extremities following administration of Cefepime.  She denies any itching/pruritus at this time.  I will go ahead and discontinue Cefepime and transition to IV Merrem.    Dami Keane MD  10/14/17  1:44 PM

## 2017-10-14 NOTE — PLAN OF CARE
Problem: Patient Care Overview (Adult)  Goal: Plan of Care Review  Outcome: Ongoing (interventions implemented as appropriate)    10/14/17 0526   Coping/Psychosocial Response Interventions   Plan Of Care Reviewed With patient   Outcome Evaluation   Outcome Summary/Follow up Plan Lungs coarse, still coughing up foamy sputum, suctioned prn. Oral care done. Repositioned every 2 hours. No c/o pain. Tolerating peg tube feedings at rate 55ml/hr. No neuro changes. Receiving abx. Safety maintained.        Goal: Adult Individualization and Mutuality  Outcome: Ongoing (interventions implemented as appropriate)  Goal: Discharge Needs Assessment  Outcome: Ongoing (interventions implemented as appropriate)    Problem: Stroke (Ischemic) (Adult)  Goal: Signs and Symptoms of Listed Potential Problems Will be Absent or Manageable (Stroke)  Outcome: Ongoing (interventions implemented as appropriate)    Problem: Fall Risk (Adult)  Goal: Absence of Falls  Outcome: Ongoing (interventions implemented as appropriate)    Problem: Wound, Traumatic, Nonburn (Adult)  Goal: Signs and Symptoms of Listed Potential Problems Will be Absent or Manageable (Wound, Traumatic, Nonburn)  Outcome: Ongoing (interventions implemented as appropriate)    Problem: Pressure Ulcer Risk (Chuy Scale) (Adult,Obstetrics,Pediatric)  Goal: Skin Integrity  Outcome: Ongoing (interventions implemented as appropriate)    Problem: Nutrition, Enteral (Adult)  Goal: Signs and Symptoms of Listed Potential Problems Will be Absent or Manageable (Nutrition, Enteral)  Outcome: Ongoing (interventions implemented as appropriate)

## 2017-10-14 NOTE — PROGRESS NOTES
PULMONARY AND CRITICAL CARE PROGRESS NOTE - [facility]    Patient: Pallavi Stricklanduhpaula  1980   MR# [id number]   Acct# [id number]  10/14/17   10:15 AM  Referring Provider: Dami Keane MD    Chief Complaint: abnormal chest xray  Interval history: Patient is awake and alert.  Difficult to understand patient 2' garbled speech and past hx of TBI.  She appears to resting comfortably on RA.  She does have audible congestion and rhonchi.  No other aggravating or allevitating factors.       Meds:    acetylcysteine 1.5 mL Nebulization BID - RT   aspirin 81 mg Oral Daily   Or      aspirin 300 mg Rectal Daily   baclofen 10 mg Oral Daily   cefepime 2 g Intravenous Once   cefepime 2 g Intravenous Q8H   dantrolene 25 mg Oral TID   enoxaparin 40 mg Subcutaneous Daily   FLUoxetine 10 mg Oral Daily   furosemide 20 mg Per G Tube Daily   gabapentin 300 mg Oral Q12H   glycopyrrolate 1 mg Per G Tube BID   iopamidol 150 mL Intravenous Once in imaging   ipratropium-albuterol 3 mL Nebulization 4x Daily - RT   levoFLOXacin 750 mg Intravenous Q24H   LORazepam 2 mg Oral Nightly   nystatin  Topical Q12H   oxybutynin XL 5 mg Oral Daily   polyvinyl alcohol 2 drop Both Eyes Nightly   predniSONE 3 mg Oral Daily   saccharomyces boulardii 250 mg Per G Tube BID   Scopolamine 2 patch Transdermal Q72H        Review of Systems:   Review of Systems   Unable to perform ROS: Other   Can not assess 2' garbled speech and pt past hx of TBI.   Physical Exam:  SpO2 Readings from Last 3 Encounters:   10/14/17 94%     Temp:  [97.8 °F (36.6 °C)-99.4 °F (37.4 °C)] 98.8 °F (37.1 °C)  Heart Rate:  [74-88] 84  Resp:  [18-20] 18  BP: (116-152)/(78-90) 133/78  Intake/Output Summary (Last 24 hours) at 10/14/17 1015  Last data filed at 10/14/17 0800   Gross per 24 hour   Intake             2261 ml   Output                0 ml   Net             2261 ml     Physical Exam   Constitutional: She appears well-developed and well-nourished. No distress.    HENT:   Head: Normocephalic and atraumatic.   Eyes: Conjunctivae and EOM are normal. Pupils are equal, round, and reactive to light. No scleral icterus.   Neck: Normal range of motion. Neck supple.   Cardiovascular: Normal rate, regular rhythm and normal heart sounds.  Exam reveals no friction rub.    No murmur heard.  Pulmonary/Chest: Effort normal. No respiratory distress. She has no wheezes. She has rhonchi (coarse). She has no rales.   Abdominal: Soft. Bowel sounds are normal. She exhibits no distension. There is no tenderness.   Musculoskeletal: She exhibits no edema.   Hx TBI, now with CVA. Left upper extremity contractions.    Neurological: She is alert. She exhibits abnormal muscle tone. Coordination abnormal.   Hx of TBI and now with CVA   Skin: Skin is warm and dry.   Psychiatric: She has a normal mood and affect. Her behavior is normal. She expresses impulsivity. She is noncommunicative.   Nursing note and vitals reviewed.    Laboratory Data:    Results from last 7 days  Lab Units 10/14/17  0708 10/12/17  0903 10/11/17  0722   WBC 10*3/mm3 8.73 7.18 9.40   HEMOGLOBIN g/dL 12.0 11.4* 11.9*   PLATELETS 10*3/mm3 193 198 204       Results from last 7 days  Lab Units 10/14/17  0708 10/12/17  0903 10/11/17  0722   SODIUM mmol/L 138 140 141   POTASSIUM mmol/L 4.0 3.5 3.6   BUN mg/dL 12 7 9   CREATININE mg/dL 0.58 0.43* 0.54         Respiratory Culture   Date Value Ref Range Status   10/13/2017 Light growth (2+) Normal Respiratory Jackie  Preliminary   10/13/2017 Light growth (2+) Non-Lactose  (A)  Preliminary     Recent films:  Imaging Results (last 24 hours)     ** No results found for the last 24 hours. **        Films reviewed personally by me.  My interpretation: none today   Pulmonary Assessment:  1. Left mid to lower lung zone atelectasis.  She could have some coexisting pneumonia but she is afebrile and her white count is not elevated.  2. History of traumatic brain injury.  3. Possible  superimposed stroke.  4. Other problems either stable, failing to improve or worsening  5. Cognitive impairment due to #1 and #2.  Recommend:   · Continue mucomyst, duonebs  · abx per attending  · CPT if tolerated  · Consider DC scopolamine     Electronically signed by TIERRA Giron, 10/14/17, 10:15 AM   Physician substantive contribution:  Pertinent symptoms/interval history include: The patient appears to be improved this afternoon.  She is less congested.  She cannot provide any cogent history.  Her chest x-ray shows improvement in her left lung atelectasis.  Respiratory exam shows pertinent findings of improved air movement on chest exam.  Plan includes: Pulmonary will sign off.  I have seen and examined patient personally, performing a face-to-face diagnostic evaluation with plan of care reviewed and developed with APRN and nursing staff. I have addended and/or modified the above history of present illness, physical examination, and assessment and plan to reflect my findings and impressions. Essential elements of the care plan were discussed with APRN above.  Agree with findings and assessment/plan as documented above.    Electronically signed by Steve Friend MD, on 10/14/2017, 1:39 PM

## 2017-10-15 LAB
BACTERIA SPEC RESP CULT: ABNORMAL
GRAM STN SPEC: ABNORMAL

## 2017-10-15 PROCEDURE — 25010000002 MEROPENEM: Performed by: INTERNAL MEDICINE

## 2017-10-15 PROCEDURE — 94799 UNLISTED PULMONARY SVC/PX: CPT

## 2017-10-15 PROCEDURE — 25010000002 ENOXAPARIN PER 10 MG: Performed by: FAMILY MEDICINE

## 2017-10-15 PROCEDURE — 25010000002 LEVOFLOXACIN PER 250 MG: Performed by: FAMILY MEDICINE

## 2017-10-15 PROCEDURE — 94760 N-INVAS EAR/PLS OXIMETRY 1: CPT

## 2017-10-15 PROCEDURE — 63710000001 PREDNISONE PER 5 MG: Performed by: INTERNAL MEDICINE

## 2017-10-15 RX ORDER — LEVOFLOXACIN 5 MG/ML
750 INJECTION, SOLUTION INTRAVENOUS
Status: DISCONTINUED | OUTPATIENT
Start: 2017-10-15 | End: 2017-10-17

## 2017-10-15 RX ADMIN — SENNOSIDES 17.2 MG: 8.6 TABLET, FILM COATED ORAL at 01:01

## 2017-10-15 RX ADMIN — NYSTATIN: 100000 POWDER TOPICAL at 23:04

## 2017-10-15 RX ADMIN — FLUOXETINE 10 MG: 10 CAPSULE ORAL at 09:00

## 2017-10-15 RX ADMIN — ENOXAPARIN SODIUM 40 MG: 40 INJECTION SUBCUTANEOUS at 09:09

## 2017-10-15 RX ADMIN — IPRATROPIUM BROMIDE AND ALBUTEROL SULFATE 3 ML: 2.5; .5 SOLUTION RESPIRATORY (INHALATION) at 07:38

## 2017-10-15 RX ADMIN — PREDNISONE 3 MG: 1 TABLET ORAL at 09:00

## 2017-10-15 RX ADMIN — OXYBUTYNIN CHLORIDE 5 MG: 5 TABLET, FILM COATED, EXTENDED RELEASE ORAL at 09:00

## 2017-10-15 RX ADMIN — DANTROLENE SODIUM 25 MG: 25 CAPSULE ORAL at 23:03

## 2017-10-15 RX ADMIN — MEROPENEM 1 G: 1 INJECTION, POWDER, FOR SOLUTION INTRAVENOUS at 23:03

## 2017-10-15 RX ADMIN — Medication 250 MG: at 09:00

## 2017-10-15 RX ADMIN — MEROPENEM 1 G: 1 INJECTION, POWDER, FOR SOLUTION INTRAVENOUS at 15:06

## 2017-10-15 RX ADMIN — SENNOSIDES 17.2 MG: 8.6 TABLET, FILM COATED ORAL at 18:11

## 2017-10-15 RX ADMIN — Medication 81 MG: at 09:00

## 2017-10-15 RX ADMIN — POLYVINYL ALCOHOL 2 DROP: 14 SOLUTION/ DROPS OPHTHALMIC at 23:04

## 2017-10-15 RX ADMIN — ACETYLCYSTEINE 1.5 ML: 200 SOLUTION ORAL; RESPIRATORY (INHALATION) at 07:38

## 2017-10-15 RX ADMIN — IPRATROPIUM BROMIDE AND ALBUTEROL SULFATE 3 ML: 2.5; .5 SOLUTION RESPIRATORY (INHALATION) at 10:58

## 2017-10-15 RX ADMIN — DANTROLENE SODIUM 25 MG: 25 CAPSULE ORAL at 09:00

## 2017-10-15 RX ADMIN — GABAPENTIN 300 MG: 300 CAPSULE ORAL at 09:09

## 2017-10-15 RX ADMIN — LEVOFLOXACIN 750 MG: 5 INJECTION, SOLUTION INTRAVENOUS at 18:56

## 2017-10-15 RX ADMIN — BACLOFEN 10 MG: 10 TABLET ORAL at 09:00

## 2017-10-15 RX ADMIN — Medication 250 MG: at 18:10

## 2017-10-15 RX ADMIN — GABAPENTIN 300 MG: 300 CAPSULE ORAL at 23:06

## 2017-10-15 RX ADMIN — MEROPENEM 1 G: 1 INJECTION, POWDER, FOR SOLUTION INTRAVENOUS at 06:38

## 2017-10-15 RX ADMIN — DANTROLENE SODIUM 25 MG: 25 CAPSULE ORAL at 18:10

## 2017-10-15 RX ADMIN — FUROSEMIDE 20 MG: 20 TABLET ORAL at 09:09

## 2017-10-15 RX ADMIN — NYSTATIN: 100000 POWDER TOPICAL at 09:00

## 2017-10-15 NOTE — PLAN OF CARE
Problem: Patient Care Overview (Adult)  Goal: Plan of Care Review  Outcome: Ongoing (interventions implemented as appropriate)    10/15/17 0456   Coping/Psychosocial Response Interventions   Plan Of Care Reviewed With patient   Patient Care Overview   Progress progress toward functional goals as expected   Outcome Evaluation   Outcome Summary/Follow up Plan No complaints of discomfort. Lungs less coarse. Not couging up the white foamy sputum. Sleeping through night. No neuro changes. Tolerating tube feedings. Last bm 10/11. Sennokot given. Recieving abx. Safety maintained. Repositioned every 2 hours. Plan for [facility] to [facility] on early Monday.        Goal: Adult Individualization and Mutuality  Outcome: Ongoing (interventions implemented as appropriate)  Goal: Discharge Needs Assessment  Outcome: Ongoing (interventions implemented as appropriate)    Problem: Stroke (Ischemic) (Adult)  Goal: Signs and Symptoms of Listed Potential Problems Will be Absent or Manageable (Stroke)  Outcome: Ongoing (interventions implemented as appropriate)    Problem: Fall Risk (Adult)  Goal: Absence of Falls  Outcome: Ongoing (interventions implemented as appropriate)    Problem: Wound, Traumatic, Nonburn (Adult)  Goal: Signs and Symptoms of Listed Potential Problems Will be Absent or Manageable (Wound, Traumatic, Nonburn)  Outcome: Ongoing (interventions implemented as appropriate)    Problem: Pressure Ulcer Risk (Chuy Scale) (Adult,Obstetrics,Pediatric)  Goal: Skin Integrity  Outcome: Ongoing (interventions implemented as appropriate)    Problem: Nutrition, Enteral (Adult)  Goal: Signs and Symptoms of Listed Potential Problems Will be Absent or Manageable (Nutrition, Enteral)  Outcome: Ongoing (interventions implemented as appropriate)

## 2017-10-15 NOTE — PROGRESS NOTES
[facility] Medicine Services  INPATIENT PROGRESS NOTE    Length of Stay: 11  Date of Admission: 10/4/2017  Primary Care Physician: ROBERTH Beckham    Subjective   Chief Complaint: Follow-up stroke; aspiration PNA  HPI   Patient appears to be resting comfortably today.  She has no increased work of breathing and no audible congestion.  She has no obvious cough.  Per nursing staff she is doing much better today.  She is much more alert and able to communicate her wishes.  She remains on tube feeds and is tolerating these with no residual.  She has much less lethargic.    Review of Systems     All pertinent negatives and positives are as above. All other systems have been reviewed and are negative unless otherwise stated.     Objective    Temp:  [98 °F (36.7 °C)-99.6 °F (37.6 °C)] 98 °F (36.7 °C)  Heart Rate:  [] 111  Resp:  [14-24] 14  BP: (132-152)/() 151/78  Physical Exam   Constitutional: No distress.   Alert   HENT:   Head: Normocephalic.   Mouth/Throat: No oropharyngeal exudate (lots of oral secretions).   Much fewer secretions than when I previously saw patient   Eyes: Pupils are equal, round, and reactive to light. No scleral icterus.   Neck: No tracheal deviation present.   Cardiovascular: Normal rate and regular rhythm.    Pulmonary/Chest: Effort normal. No respiratory distress.   Coarse BSs on the left with diminished BSs at left base   Abdominal: Soft.   PEG in place   Neurological: She is alert.   Skin: Skin is warm and dry. She is not diaphoretic.   Vitals reviewed.      Results Review:  I have reviewed the labs, radiology results, and diagnostic studies.    Laboratory Data:     Results from last 7 days  Lab Units 10/14/17  0708 10/12/17  0903 10/11/17  0722   WBC 10*3/mm3 8.73 7.18 9.40   HEMOGLOBIN g/dL 12.0 11.4* 11.9*   HEMATOCRIT % 37.1 35.4* 36.5*   PLATELETS 10*3/mm3 193 198 204          Results from last 7 days  Lab Units 10/14/17  0708  10/12/17  0903 10/11/17  0722   SODIUM mmol/L 138 140 141   POTASSIUM mmol/L 4.0 3.5 3.6   CHLORIDE mmol/L 99 105 105   CO2 mmol/L 28.0 26.0 24.0   BUN mg/dL 12 7 9   CREATININE mg/dL 0.58 0.43* 0.54   CALCIUM mg/dL 9.6 9.1 8.8   BILIRUBIN mg/dL 0.2  --   --    ALK PHOS U/L 103  --   --    ALT (SGPT) U/L 31  --   --    AST (SGOT) U/L 30  --   --    GLUCOSE mg/dL 118* 108* 112*       Culture Data:        Radiology Data:   Imaging Results (last 24 hours)     Procedure Component Value Units Date/Time    XR Chest 1 View [[id number]] Collected:  10/14/17 1153     Updated:  10/14/17 1158    Narrative:       XR CHEST 1 VW- 10/14/2017 12:26 PM EDT     HISTORY: PNA; R13.12-Dysphagia, oropharyngeal phase; Z74.09-Other  reduced mobility; Z74.09-Other reduced mobility       COMPARISON: Exam dated 10/12/2017.     FINDINGS:      Previously identified dense left lung infiltrate appears to be  decreasing although is not completely resolved. Prominence of the right  hilar contour is thought to reflect reactive adenopathy in the hilum. No  new lung infiltrates are identified. The right lung is clear. The  pulmonary vasculature are unremarkable. No acute bone or soft tissue  abnormality.       Impression:       1. Previously identified dense left lung consolidation is decreasing  although not completely resolved.  2. No new lung infiltrates.        This report was finalized on 10/14/2017 11:55 by Dr Kg Villatoro, .          I have reviewed the patient current medications.     Assessment/Plan     Hospital Problem List     * (Principal)Oropharyngeal dysphagia    Overview Signed 10/10/2017  7:34 AM by Josefina Barry MD     Added automatically from request for surgery [id number]         CVA (cerebral vascular accident)        Assessment:  1.  Multiple strokes in the left subcortical region  2.  History of traumatic brain injury  3.  Dysphagia requiring PEG placement  4.  Dysarthria  5.  Aspiration pneumonia - worsening left sided  infiltrates/consolidation on 10/12 CXR with improvement on 10/14  6.  Right sided rib fractures    Plan:  1.  Clindamycin dc'd yesterday and started on IV Cefepime (allergic rxn, switched to ronaldo) and Levaquin (day 3 today); patient has Vanco allergy  2.  Follow-up new respiratory culture   3.  Repeat CXR-improvement   4.  Appreciate Pulmonary input; Scopolamine stopped  5.  TFs with Jevity  6.  Probiotic  7.  ASA therapy  8.  PT/OT/ST  9.  Dispo: [facility] at discharge-possibly however seems to be improving once abx were broadened. May need another few days with IV therapy and then d/c.   10.  Continue to try to limit sedating meds and follow along closely  11.  Add nebs (scheduled) including Mucomyst    Latrice Guillen MD   10/15/17   10:21 AM         Latrice Guillen MD  10/15/17  10:21 AM

## 2017-10-15 NOTE — PLAN OF CARE
Problem: Patient Care Overview (Adult)  Goal: Plan of Care Review  Outcome: Ongoing (interventions implemented as appropriate)    10/15/17 7729   Coping/Psychosocial Response Interventions   Plan Of Care Reviewed With patient   Patient Care Overview   Progress improving   Outcome Evaluation   Outcome Summary/Follow up Plan Pt much more alert this shift. Speech much improved with words more distinguished and less garbled. Possible Dc tomorrow. Antibiotics changed based on final culture results. Safety maintained. Will continue to monitor.          Problem: Stroke (Ischemic) (Adult)  Goal: Signs and Symptoms of Listed Potential Problems Will be Absent or Manageable (Stroke)  Outcome: Ongoing (interventions implemented as appropriate)    Problem: Fall Risk (Adult)  Goal: Absence of Falls  Outcome: Ongoing (interventions implemented as appropriate)    Problem: Wound, Traumatic, Nonburn (Adult)  Goal: Signs and Symptoms of Listed Potential Problems Will be Absent or Manageable (Wound, Traumatic, Nonburn)  Outcome: Ongoing (interventions implemented as appropriate)    Problem: Pressure Ulcer Risk (Chuy Scale) (Adult,Obstetrics,Pediatric)  Goal: Skin Integrity  Outcome: Ongoing (interventions implemented as appropriate)    Problem: Nutrition, Enteral (Adult)  Goal: Signs and Symptoms of Listed Potential Problems Will be Absent or Manageable (Nutrition, Enteral)  Outcome: Ongoing (interventions implemented as appropriate)

## 2017-10-16 ENCOUNTER — APPOINTMENT (OUTPATIENT)
Dept: LAB | Facility: HOSPITAL | Age: 37
End: 2017-10-16

## 2017-10-16 PROCEDURE — G9162 LANG EXPRESS CURRENT STATUS: HCPCS

## 2017-10-16 PROCEDURE — 94799 UNLISTED PULMONARY SVC/PX: CPT

## 2017-10-16 PROCEDURE — 25010000002 LEVOFLOXACIN PER 250 MG: Performed by: FAMILY MEDICINE

## 2017-10-16 PROCEDURE — 92523 SPEECH SOUND LANG COMPREHEN: CPT

## 2017-10-16 PROCEDURE — 63710000001 PREDNISONE PER 5 MG: Performed by: INTERNAL MEDICINE

## 2017-10-16 PROCEDURE — 25010000002 ENOXAPARIN PER 10 MG: Performed by: FAMILY MEDICINE

## 2017-10-16 PROCEDURE — G9163 LANG EXPRESS GOAL STATUS: HCPCS

## 2017-10-16 PROCEDURE — G9164 LANG EXPRESS D/C STATUS: HCPCS

## 2017-10-16 PROCEDURE — 25010000002 MEROPENEM: Performed by: INTERNAL MEDICINE

## 2017-10-16 PROCEDURE — 99232 SBSQ HOSP IP/OBS MODERATE 35: CPT | Performed by: PSYCHIATRY & NEUROLOGY

## 2017-10-16 RX ADMIN — ACETYLCYSTEINE 1.5 ML: 200 SOLUTION ORAL; RESPIRATORY (INHALATION) at 07:03

## 2017-10-16 RX ADMIN — GABAPENTIN 300 MG: 300 CAPSULE ORAL at 21:07

## 2017-10-16 RX ADMIN — DANTROLENE SODIUM 25 MG: 25 CAPSULE ORAL at 16:29

## 2017-10-16 RX ADMIN — BISACODYL 10 MG: 10 SUPPOSITORY RECTAL at 06:23

## 2017-10-16 RX ADMIN — MEROPENEM 1 G: 1 INJECTION, POWDER, FOR SOLUTION INTRAVENOUS at 06:23

## 2017-10-16 RX ADMIN — BACLOFEN 10 MG: 10 TABLET ORAL at 10:16

## 2017-10-16 RX ADMIN — FUROSEMIDE 20 MG: 20 TABLET ORAL at 10:16

## 2017-10-16 RX ADMIN — GABAPENTIN 300 MG: 300 CAPSULE ORAL at 10:15

## 2017-10-16 RX ADMIN — METOPROLOL TARTRATE 12.5 MG: 25 TABLET, FILM COATED ORAL at 21:11

## 2017-10-16 RX ADMIN — POLYVINYL ALCOHOL 2 DROP: 14 SOLUTION/ DROPS OPHTHALMIC at 21:05

## 2017-10-16 RX ADMIN — IPRATROPIUM BROMIDE AND ALBUTEROL SULFATE 3 ML: 2.5; .5 SOLUTION RESPIRATORY (INHALATION) at 19:58

## 2017-10-16 RX ADMIN — ENOXAPARIN SODIUM 40 MG: 40 INJECTION SUBCUTANEOUS at 10:16

## 2017-10-16 RX ADMIN — FLUOXETINE 10 MG: 10 CAPSULE ORAL at 10:22

## 2017-10-16 RX ADMIN — Medication 250 MG: at 16:31

## 2017-10-16 RX ADMIN — NYSTATIN 1 APPLICATION: 100000 POWDER TOPICAL at 21:05

## 2017-10-16 RX ADMIN — PREDNISONE 3 MG: 1 TABLET ORAL at 10:17

## 2017-10-16 RX ADMIN — LEVOFLOXACIN 750 MG: 5 INJECTION, SOLUTION INTRAVENOUS at 19:48

## 2017-10-16 RX ADMIN — DANTROLENE SODIUM 25 MG: 25 CAPSULE ORAL at 10:16

## 2017-10-16 RX ADMIN — NYSTATIN: 100000 POWDER TOPICAL at 10:34

## 2017-10-16 RX ADMIN — Medication 81 MG: at 10:16

## 2017-10-16 RX ADMIN — OXYBUTYNIN CHLORIDE 5 MG: 5 TABLET, FILM COATED, EXTENDED RELEASE ORAL at 10:16

## 2017-10-16 RX ADMIN — IPRATROPIUM BROMIDE AND ALBUTEROL SULFATE 3 ML: 2.5; .5 SOLUTION RESPIRATORY (INHALATION) at 10:44

## 2017-10-16 RX ADMIN — Medication 250 MG: at 10:16

## 2017-10-16 RX ADMIN — ACETYLCYSTEINE 4 ML: 200 SOLUTION ORAL; RESPIRATORY (INHALATION) at 19:58

## 2017-10-16 RX ADMIN — IPRATROPIUM BROMIDE AND ALBUTEROL SULFATE 3 ML: 2.5; .5 SOLUTION RESPIRATORY (INHALATION) at 14:54

## 2017-10-16 RX ADMIN — METOPROLOL TARTRATE 12.5 MG: 25 TABLET, FILM COATED ORAL at 10:20

## 2017-10-16 RX ADMIN — MEROPENEM 1 G: 1 INJECTION, POWDER, FOR SOLUTION INTRAVENOUS at 16:30

## 2017-10-16 RX ADMIN — IPRATROPIUM BROMIDE AND ALBUTEROL SULFATE 3 ML: 2.5; .5 SOLUTION RESPIRATORY (INHALATION) at 07:03

## 2017-10-16 RX ADMIN — DANTROLENE SODIUM 25 MG: 25 CAPSULE ORAL at 21:06

## 2017-10-16 NOTE — THERAPY RE-EVALUATION
Acute Care - Speech Language Pathology Re-Evaluation  [facility]     Patient Name: Pallavi Yee  : 1980  MRN: [id number]  Today's Date: 10/16/2017  Onset of Illness/Injury or Date of Surgery Date: 10/04/17  Date of Referral to SLP: 10/04/17  Referring Physician: Dr. Guillen      Admit Date: 10/4/2017   ST completed re-evaluation for speech/language/cognition on 10/16/2017. Upon entering the room, pt was attempting to take her gown off and speaking in unintelligible jargon. ST attempted to cover pt back up but she shook her head to indicate no. Pt was unable to answer simple yes/no questions, follow directions, attend to speaker, or express thoughts/needs/wants. Pt presented with wet vocal quality, weak cough, and weak gurgly throat clear. ST attempted one PO trial of thin liquids via straw; pt bit down on straw and turned her head the opposite direction. Pt still not appropriate for hydration/nutrition by mouth.  Adelaida Hester MS, CFY-SLP 10/16/2017 9:44 AM  Visit Dx:    ICD-10-CM ICD-9-CM   1. Oropharyngeal dysphagia R13.12 787.22   2. Mobility impaired Z74.09 799.89   3. Impaired mobility and ADLs Z74.09 799.89   4. Cognitive impairment R41.89 294.9     Patient Active Problem List   Diagnosis   • CVA (cerebral vascular accident)   • Oropharyngeal dysphagia     Past Medical History:   Diagnosis Date   • Depression    • Left-sided weakness    • Lupus    • MRSA (methicillin resistant staph aureus) culture positive    • TBI (traumatic brain injury)      Past Surgical History:   Procedure Laterality Date   •  SECTION     • ENDOSCOPY N/A 10/10/2017    Procedure: ESOPHAGOGASTRODUODENOSCOPY WITH ANESTHESIA;  Surgeon: Josefina Barry MD;  Location: [facility] ENDOSCOPY;  Service:    • PEG TUBE INSERTION N/A 10/10/2017    Procedure: PERCUTANEOUS ENDOSCOPIC GASTROSTOMY TUBE INSERTION;  Surgeon: Josefina Barry MD;  Location: [facility] ENDOSCOPY;  Service:    • TRACHEOSTOMY            SLP EVALUATION (last 72 hours)       SLP Evaluation       10/16/17 0906                Rehab Evaluation    Document Type re-evaluation  -EA        Subjective Information unable to respond   pt using unintelligible jargon  -EA        Patient Effort, Rehab Treatment poor  -EA        General Information    Patient Profile Review yes  -EA        Onset of Illness/Injury 10/04/17  -EA        Referring Physician Dr. Guillen  -EA        Pertinent History Of Current Problem Acute CVA; oropharyngeal dysphagia  -EA        Current Diet Limitations NPO  -EA        Precautions/Limitations, Vision WFL with corrective lenses  -EA        Precautions/Limitations, Hearing WFL  -EA        Prior Level of Function- Communication functional in a familiar environment/with familiar caregiver  -EA        Prior Level of Function- Swallowing no diet consistency restrictions  -EA        Barriers to Rehab cognitive status  -EA        Living Environment    Lives With facility resident  -EA        Living Arrangements group home  -EA        Clinical Impression    Date of Referral to SLP 10/04/17  -EA        SLP Diagnosis Moderate-Severe Expressive/Receptive Language Impairment; dysphagia; cognitive impairment  -EA        Functional Level At Time Of Evaluation impaired  -EA        Patient's Goals For Discharge patient could not state  -EA        Criteria for Skilled Therapeutic Interventions Met skilled criteria for cognitive linguistic intervention met;skilled criteria for speech language intervention met  -EA        Therapy Frequency 3-5 times/wk  -EA        Predicted Duration of Therapy Intervention (days/wks) until discharge  -EA        Expected Duration of Therapy Session (min) 15-30 minutes  -EA        Anticipated Discharge Disposition other (see comments)   unknown  -EA        Cognitive Assessment/Intervention    Current Cognitive/Communication Assessment impaired  -EA        Orientation Status unable/difficult to assess  -EA        Follows Commands/Answers Questions unable to  answer questions  -EA        Personal Safety unable/difficult to assess  -EA        Communication Treatment Objective and Progress    Receptive Language Treatment Objectives Improve ability to comprehend words/phrases/sentences;Improve ability to follow directions;Improve ability to comprehend questions  -EA        Expressive Treatment Objectives Improve ability to construct phrase and sentence level responses;Improve connected speech to express thoughts  -EA        Cognitive Linguistic Treatment Objectives Improve attention;Improve orientation;Improve executive function skills  -EA        Improve ability to comprehend words/phrases/sentences    Improve ability to comprehend words/phrases/sentences through: identify objects, field of;identify pictures, field of;90%;without cues  -EA        Status: Improve ability to comprehend words/phrases/sentences New  -EA        Ability to Comprehend Words/Phrases/Sentences Progress continue to address  -EA        Improve ability to follow directions    Improve ability to follow directions: one-step directions without objects;two-step commands;90%;without cues  -EA        Status: Improve ability to follow directions New  -EA        Ability to Follow Directions Progress continue to address  -EA        Improve ability to comprehend questions    Improve ability to comprehend questions: simple yes/no questions;simple general questions;90%;without cues  -EA        Status: Improve ability to comprehend questions New  -EA        Ability to Comprehend Questions Progress continue to address  -EA        Improve ability to construct phrase and sentence level responses    Improve ability to construct phrase and sentence level responses by: answering question with phrase;90%;without cues  -EA        Status: Improve ability to construct phrase and sentence level responses New  -EA        Constructing Phrase and Sentence Level Responses Progress continue to address  -EA        Improve  connected speech to express thoughts    Improve connected speech to express thoughts by: describing a picture;90%;without cues  -EA        Status: Improve connected speech to express thoughts New  -EA        Connected Speech Progress continue to address  -EA        Improve attention    Improve attention by: attending to task;looking at speaker;90%;without cues  -EA        Status: Improve attention New  -EA        Attention Progress continue to address  -EA        Improve orientation    Improve orientation through: demonstrating orientation to day;demonstrating orientation to month;demonstrating orientation to year;demonstrating orientation to place;90%;without cues  -EA        Status: Improve orientation through New  -EA        Orientation Progress continue to address  -EA        Improve executive function skills    Improve executive function skills: demonstrate awareness of deficit;identify anticipated needs;90%;without cues  -EA        Status: Improve executive function skills New  -EA        Executive Function Skills Progress continue to address  -EA          User Key  (r) = Recorded By, (t) = Taken By, (c) = Cosigned By    Initials Name Effective Dates    EA Adelaida Hester MS, CFY-SLP 02/21/17 -            EDUCATION  The patient has been educated in the following areas:   Cognitive Impairment Communication Impairment.    SLP Recommendation and Plan  SLP Diagnosis: Moderate-Severe Expressive/Receptive Language Impairment; dysphagia; cognitive impairment        Criteria for Skilled Therapeutic Interventions Met: skilled criteria for cognitive linguistic intervention met, skilled criteria for speech language intervention met  Anticipated Discharge Disposition: other (see comments) (unknown)     Therapy Frequency: 3-5 times/wk  Predicted Duration of Therapy Intervention (days/wks): until discharge  Expected Duration of Therapy Session (min): 15-30 minutes    Plan of Care Review  Plan Of Care Reviewed With:  patient  Progress: progress toward functional goals as expected  Outcome Summary/Follow up Plan: ST completed re-evaluation for speech/language/cognition on 10/16/2017. Upon entering the room, pt was attempting to take her gown off and speaking in unintelligible jargon. ST attempted to cover pt back up but she shook her head to indicate no. Pt was unable to answer simple yes/no questions, follow directions, attend to speaker, or express thoughts/needs/wants. Pt presented with wet vocal quality, weak cough, and weak gurgly throat clear. ST attempted one PO trial of thin liquids via straw; pt bit down on straw and turned her head the opposite direction. Pt still not appropriate for hydration/nutrition by mouth.          IP SLP Goals       10/16/17 0934 10/13/17 1218 10/11/17 1253    Begin to Take Some PO Safely    Begin to Take Some PO Safely- SLP, Date Goal Reviewed  10/13/17  -TM 10/11/17  -KW    Receptive Language- Optimal Participation in Care    Receptive Language Optimal Participation in Care- SLP, Date Established 10/16/17  -EA      Receptive Language Optimal Participation in Care- SLP, Time to Achieve by discharge  -EA      Receptive Language Optimal Participation in Care- SLP, Activity Level Patient will improve ability to comprehend words/phrases/sentences;Patient will improve ability to follow directions;Patient will improve ability to comprehend questions  -EA      Receptive Language Optimal Participation in Care- SLP, Date Goal Reviewed 10/16/17  -EA      Receptive Language Optimal Participation in Care- SLP, Outcome goal ongoing  -EA      Expressive- Optimal Participation in Care    Expressive Optimal Participation in Care- SLP, Date Established 10/16/17  -EA      Expressive Optimal Participation in Care- SLP, Time to Achieve by discharge  -EA      Expressive Optimal Participation in Care- SLP, Activity Level Patient will improve ability to construct phrase and sentence level responses;Patient will  improve connected speech to express thoughts  -EA      Expressive Optimal Participation in Care- SLP, Date Goal Reviewed 10/16/17  -EA      Expressive Optimal Participation in Care- SLP, Outcome goal ongoing  -EA      Cognitive Linguistic- Optimal Participation in Care    Cognitive Linguistic Optimal Participation in Care- SLP, Date Established 10/16/17  -EA      Cognitive Linguistic Optimal Participation in Care- SLP, Time to Achieve by discharge  -EA      Cognitive Linguistic Optimal Participation in Care- SLP, Activity Level Patient will improve attention skills for increased safety in environment;Patient will improve orientation for increased safety in environment;Patient will improve Executive functioning skills for increased safety in environment  -EA      Cognitive Linguistic Optimal Participation in Care- SLP, Date Goal Reviewed 10/16/17  -EA      Cognitive Linguistic Optimal Participation in Care- SLP, Outcome goal ongoing  -EA        10/06/17 1135 10/05/17 0910       Begin to Take Some PO Safely    Begin to Take Some PO Safely- SLP, Date Established  10/05/17  -BN     Begin to Take Some PO Safely- SLP, Time to Achieve  by discharge  -BN     Begin to Take Some PO Safely- SLP, Activity Level  Patient will improve oral skills for more efficient eating;Patient will improve timing of pharyngeal response for safer, more efficient swallow  -BN     Begin to Take Some PO Safely- SLP, Additional Goal  Pt will tolerate PO trials with SLP without s/s of aspiration  -BN     Begin to Take Some PO Safely- SLP, Date Goal Reviewed 10/06/17  -TM 10/05/17  -BN     Begin to Take Some PO Safely- SLP, Outcome  goal ongoing  -BN       User Key  (r) = Recorded By, (t) = Taken By, (c) = Cosigned By    Initials Name Provider Type    TM Fatuma Leger, CCC-SLP Speech and Language Pathologist    KW Malika Leong, MS CCC-SLP Speech and Language Pathologist    JOHN Aguirre, CCC-SLP Speech and Language Pathologist    CRESCENCIO  Adelaida Hester MS, CFY-SLP Speech and Language Pathologist             SLP Outcome Measures (last 72 hours)      SLP Outcome Measures       10/16/17 0900          SLP Outcome Measures    Outcome Measure Used? Adult NOMS  -EA      FCM Scores    Spoken Language Expression FCM Score 1  -EA        User Key  (r) = Recorded By, (t) = Taken By, (c) = Cosigned By    Initials Name Effective Dates    CRESCENCIO Son DECLAN Mir, MS, CFY-SLP 02/21/17 -           Time Calculation:         Time Calculation- SLP       10/16/17 0943          Time Calculation- SLP    SLP Start Time 0906  -EA      SLP Stop Time 0944  -EA      SLP Time Calculation (min) 38 min  -EA      SLP Received On 10/16/17  -EA      SLP Goal Re-Cert Due Date 10/26/17  -EA        User Key  (r) = Recorded By, (t) = Taken By, (c) = Cosigned By    Initials Name Provider Type    CRESCENCIO Hester MS, CFY-SLP Speech and Language Pathologist          Therapy Charges for Today     Code Description Service Date Service Provider Modifiers Qty    06178535333 HC ST SPOKEN LANG EXPRESS CURRENT 10/16/2017 Adelaida Hester, MS, CFY-SLP GN, CM 1    99604035123 HC ST SPOKEN LANG EXPRESS PROJECTED 10/16/2017 Adelaida Hester MS, CFY-SLP GN, CM 1    60421355394 HC ST SPOKEN LANG EXPRESS DISCHARGE 10/16/2017 Adelaida Hester, MS, CFY-SLP GN, CM 1    70069565625 HC ST EVAL SPEECH AND PROD W LANG  3 10/16/2017 Adelaida Hester MS, CFY-SLP GN, KX 1             ADULT NOMS (last 72 hours)      Adult NOMS       10/16/17 0900                FCM Scores    Spoken Language Expression FCM Score 1  -EA          User Key  (r) = Recorded By, (t) = Taken By, (c) = Cosigned By    Initials Name Effective Dates    CRESCENCIO Hester, MS, CFY-SLP 02/21/17 -         SLP G-Codes  SLP NOMS Used?: Yes  Functional Limitations: Spoken language expressive  Swallow Current Status (): At least 80 percent but less than 100 percent impaired, limited or restricted  Swallow Goal Status (): At least 60 percent but less than 80  percent impaired, limited or restricted  Spoken Language Expression Current Status (): At least 80 percent but less than 100 percent impaired, limited or restricted  Spoken Language Expression Goal Status (): At least 80 percent but less than 100 percent impaired, limited or restricted  Spoken Language Expression Discharge Status (): At least 80 percent but less than 100 percent impaired, limited or restricted      Adelaida Hester MS, CFY-SLP  10/16/2017

## 2017-10-16 NOTE — PROGRESS NOTES
Continued Stay Note  JANIS Lisa     Patient Name: Pallavi Yee  MRN: [id number]  Today's Date: 10/16/2017    Admit Date: 10/4/2017          Discharge Plan       10/16/17 1354    Case Management/Social Work Plan    Plan [facility]    Patient/Family In Agreement With Plan yes    Additional Comments Updated Ingrid [phone] that pt may d/c in 1-2 days. She is not ready today.               Discharge Codes     None            DEQUAN Flower

## 2017-10-16 NOTE — PLAN OF CARE
Problem: Patient Care Overview (Adult)  Goal: Plan of Care Review  Outcome: Ongoing (interventions implemented as appropriate)    10/16/17 0520   Coping/Psychosocial Response Interventions   Plan Of Care Reviewed With patient   Patient Care Overview   Progress progress toward functional goals as expected   Outcome Evaluation   Outcome Summary/Follow up Plan Patient c/o of feeling hot, even though she has fan blowing on her, and her skin is cool. She refused respiratory treatment last night. Lungs less coarse. Still has cough.Speech less garbled, making needs more known. Receiving iv abx. Has not had bm yet. Will give suppository. Patient's mother called last night, spoke for 15 minutes. [facility] called, wants to be notified a day in advance , when patient is expected to be discharged. safety maintained.        Goal: Adult Individualization and Mutuality  Outcome: Ongoing (interventions implemented as appropriate)  Goal: Discharge Needs Assessment  Outcome: Ongoing (interventions implemented as appropriate)    Problem: Stroke (Ischemic) (Adult)  Goal: Signs and Symptoms of Listed Potential Problems Will be Absent or Manageable (Stroke)  Outcome: Ongoing (interventions implemented as appropriate)    Problem: Fall Risk (Adult)  Goal: Absence of Falls  Outcome: Ongoing (interventions implemented as appropriate)    Problem: Wound, Traumatic, Nonburn (Adult)  Goal: Signs and Symptoms of Listed Potential Problems Will be Absent or Manageable (Wound, Traumatic, Nonburn)  Outcome: Ongoing (interventions implemented as appropriate)    Problem: Pressure Ulcer Risk (Chuy Scale) (Adult,Obstetrics,Pediatric)  Goal: Skin Integrity  Outcome: Ongoing (interventions implemented as appropriate)    Problem: Nutrition, Enteral (Adult)  Goal: Signs and Symptoms of Listed Potential Problems Will be Absent or Manageable (Nutrition, Enteral)  Outcome: Ongoing (interventions implemented as appropriate)

## 2017-10-16 NOTE — PROGRESS NOTES
Neurology Progress Note      Date of admission: 10/4/2017  5:24 PM  Date of visit: 10/16/2017    Chief Complaint:  Strokes    Subjective     Subjective:  She looks somewhat better today.  She is more awake and makes more appropriate conversation.  She does endorse some groin pain.  The nursing staff told me that she has had some excoriation around the groin area which they have been treating with nystatin.  The patient denies any other complaints currently.  Medications:  Current Facility-Administered Medications   Medication Dose Route Frequency Provider Last Rate Last Dose   • acetaminophen (TYLENOL) suppository 650 mg  650 mg Rectal Q4H PRN Latrice Guillen MD   650 mg at 10/05/17 1416   • acetylcysteine (MUCOMYST) 20 % nebulizer solution 1.5 mL  1.5 mL Nebulization BID - RT Steve Friend MD   1.5 mL at 10/16/17 0703   • aspirin chewable tablet 81 mg  81 mg Oral Daily Albert Kraft MD   81 mg at 10/15/17 0900    Or   • aspirin suppository 300 mg  300 mg Rectal Daily Albert Kraft MD   300 mg at 10/10/17 1220   • baclofen (LIORESAL) tablet 10 mg  10 mg Oral Daily Dami Keane MD   10 mg at 10/15/17 0900   • bisacodyl (DULCOLAX) suppository 10 mg  10 mg Rectal Daily PRN Humberto Russell MD   10 mg at 10/16/17 0623   • dantrolene (DANTRIUM) capsule 25 mg  25 mg Oral TID Dylan Johnson DO   25 mg at 10/15/17 2303   • enoxaparin (LOVENOX) syringe 40 mg  40 mg Subcutaneous Daily Latrice Guillen MD   40 mg at 10/15/17 0909   • FLUoxetine (PROzac) capsule 10 mg  10 mg Oral Daily Dylan Johnson DO   10 mg at 10/15/17 0900   • furosemide (LASIX) tablet 20 mg  20 mg Per G Tube Daily Dylan Johnson DO   20 mg at 10/15/17 0909   • gabapentin (NEURONTIN) capsule 300 mg  300 mg Oral Q12H Dami Keane MD   300 mg at 10/15/17 2306   • HYDROmorphone (DILAUDID) injection 0.5 mg  0.5 mg Intravenous Q2H PRN Humberto Russell MD   0.5 mg at 10/11/17 0751   • iopamidol (ISOVUE-370) 76  % injection 150 mL  150 mL Intravenous Once in imaging Humberto Russell MD       • ipratropium-albuterol (DUO-NEB) nebulizer solution 3 mL  3 mL Nebulization 4x Daily - RT Dami Keane MD   3 mL at 10/16/17 0703   • labetalol (NORMODYNE,TRANDATE) injection 10 mg  10 mg Intravenous Q10 Min PRN Albert Kraft MD       • levoFLOXacin (LEVAQUIN) 750 mg/150 mL D5W (premix) (LEVAQUIN) 750 mg  750 mg Intravenous Q24H Latrice Guillen MD   750 mg at 10/15/17 1856   • meropenem (MERREM) 1 g/100 mL 0.9% NS VTB (mbp)  1 g Intravenous Q8H Dami Keane MD 0 mL/hr at 10/15/17 0137 1 g at 10/16/17 0623   • metoprolol tartrate (LOPRESSOR) tablet 12.5 mg  12.5 mg Oral Q12H Latrice Guillen MD       • nystatin (MYCOSTATIN) powder   Topical Q12H Dylan Johnson DO       • ondansetron (ZOFRAN) injection 4 mg  4 mg Intravenous Q6H PRN Albert Kraft MD       • oxybutynin XL (DITROPAN-XL) 24 hr tablet 5 mg  5 mg Oral Daily Dylan Johnson DO   5 mg at 10/15/17 0900   • Pharmacy to Dose meropenem (MERREM)   Does not apply Continuous PRN Dami Keane MD       • polyvinyl alcohol (LIQUIFILM) 1.4 % ophthalmic solution 2 drop  2 drop Both Eyes Nightly Dylan Johnson DO   2 drop at 10/15/17 2304   • predniSONE (DELTASONE) tablet 3 mg  3 mg Oral Daily Dylan Johnson DO   3 mg at 10/15/17 0900   • saccharomyces boulardii (FLORASTOR) capsule 250 mg  250 mg Per G Tube BID Dami Keane MD   250 mg at 10/15/17 1810   • senna (SENOKOT) tablet 17.2 mg  2 tablet Oral Daily PRN Dylan Johnson DO   17.2 mg at 10/15/17 1811   • sodium chloride 0.9 % flush 1-10 mL  1-10 mL Intravenous PRN Latrice Guillen MD       • sodium chloride 0.9 % flush 1-10 mL  1-10 mL Intravenous PRN Albert Kraft MD           Review of Systems:   -A 14 point review of systems is completed and is difficult to obtain    Objective     Objective      Vital Signs  Temp:  [97.7 °F (36.5 °C)-98.6 °F (37 °C)] 98.2 °F  (36.8 °C)  Heart Rate:  [] 94  Resp:  [16-18] 18  BP: (123-151)/() 148/96    Physical Exam:    HEENT:  Neck supple  CVS:  Regular rate and rhythm.  No murmurs  Carotid Examination:  No bruits  Lungs:  Clear to auscultation  Abdomen:  Non-tender, Non-distended  Extremities:  No signs of peripheral edema    Neurologic Exam:    -Awake, Alert, Very dysarthric  -Not following commands    Cranial nerves II through XII about the same.    Motor: Moving right upper and right lower extremity well.  Not moving her left upper extremity.  --contractures of left upper extremity    Results Review:    I reviewed the patient's new clinical results.    Lab Results (last 24 hours)     Procedure Component Value Units Date/Time    Respiratory Culture - Sputum, NT Suction [[id number]]  (Abnormal)  (Susceptibility) Collected:  10/13/17 1556    Specimen:  Sputum from NT Suction Updated:  10/15/17 1132     Respiratory Culture --      Moderate growth (3+) Normal Respiratory Jens      Moderate growth (3+) Stenotrophomonas maltophilia (A)     Gram Stain Result Greater than 25 WBCs per low power field      Moderate (3+) Epithelial cells seen      Moderate (3+) Mixed gram positive jens      Few (2+) Gram negative bacilli      Rare (1+) Budding yeast    Susceptibility      Stenotrophomonas maltophilia     GURJIT     Levofloxacin 2 ug/ml Susceptible     Trimethoprim + Sulfamethoxazole <=20 ug/ml Susceptible                        Imaging Results (last 24 hours)     ** No results found for the last 24 hours. **        Echocardiogram:   · Left ventricular wall thickness is consistent with mild concentric hypertrophy.  · Left ventricular systolic function is normal. Estimated EF appears to be in the range of 61 - 65%.  · No evidence of a patent foramen ovale.  · No significant valvular abnormalities are identified.    CTA of neck reviewed showing no significant stenosis  Assessment/Plan     Hospital Problem List    Principal Problem:     Oropharyngeal dysphagia  Active Problems:    CVA (cerebral vascular accident)    Impression:  1. Multiple acute ischemic strokes..  2. Left internal carotid artery stenosis at 50 to 69%.  3.  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  4.  Aspiration pneumonia  5.  Dysarthria  6.  Dysphagia requiring PEG placement    Plan:  Continue aspirin   Lipitor at discharge  Smoking cessation counseling when she is able to comprehend.   Continue PT/OT  PEG in place and working  Discharge planning ongoing.  Patient is clinically improving.    Albert Kraft MD  10/16/17  10:05 AM

## 2017-10-16 NOTE — PLAN OF CARE
Problem: Patient Care Overview (Adult)  Goal: Plan of Care Review  Outcome: Ongoing (interventions implemented as appropriate)    10/16/17 1529   Coping/Psychosocial Response Interventions   Plan Of Care Reviewed With patient   Patient Care Overview   Progress no change   Outcome Evaluation   Outcome Summary/Follow up Plan slept most of day...mystatin to excoriated groin...incontinent...VSS...continue IV abx...safety maintained...plan on d/c 1-2 days...tolerating tf...55 ml/hr...no residual today...continue to monitor        Goal: Adult Individualization and Mutuality  Outcome: Ongoing (interventions implemented as appropriate)  Goal: Discharge Needs Assessment  Outcome: Ongoing (interventions implemented as appropriate)    Problem: Stroke (Ischemic) (Adult)  Goal: Signs and Symptoms of Listed Potential Problems Will be Absent or Manageable (Stroke)  Outcome: Ongoing (interventions implemented as appropriate)    Problem: Fall Risk (Adult)  Goal: Absence of Falls  Outcome: Ongoing (interventions implemented as appropriate)    Problem: Wound, Traumatic, Nonburn (Adult)  Goal: Signs and Symptoms of Listed Potential Problems Will be Absent or Manageable (Wound, Traumatic, Nonburn)  Outcome: Ongoing (interventions implemented as appropriate)    Problem: Pressure Ulcer Risk (Chuy Scale) (Adult,Obstetrics,Pediatric)  Goal: Skin Integrity  Outcome: Ongoing (interventions implemented as appropriate)    Problem: Nutrition, Enteral (Adult)  Goal: Signs and Symptoms of Listed Potential Problems Will be Absent or Manageable (Nutrition, Enteral)  Outcome: Ongoing (interventions implemented as appropriate)

## 2017-10-16 NOTE — PROGRESS NOTES
[facility] Medicine Services  INPATIENT PROGRESS NOTE    Length of Stay: 12  Date of Admission: 10/4/2017  Primary Care Physician: ROBERTH Beckham    Subjective   Chief Complaint: Follow-up stroke; aspiration PNA  HPI   Patient Is awake alert and in no distress.  She has an occasional congested cough but overall less oral secretions are noted.  She is pointing to an grabbing at her left groin area saying hurt.  Nursing staff reports she has been complaining of this area as she does have some excoriated skin here.  They have been applying nystatin.  She is tolerating her tube feeds with no residuals.    Review of Systems     All pertinent negatives and positives are as above. All other systems have been reviewed and are negative unless otherwise stated.     Objective    Temp:  [97.7 °F (36.5 °C)-98.6 °F (37 °C)] 98 °F (36.7 °C)  Heart Rate:  [] 87  Resp:  [14-18] 18  BP: (123-151)/() 123/69  Physical Exam   Constitutional: No distress.   Alert   HENT:   Head: Normocephalic.   Mouth/Throat: No oropharyngeal exudate (lots of oral secretions).   Much fewer secretions than when I previously saw patient   Eyes: Pupils are equal, round, and reactive to light. No scleral icterus.   Neck: No tracheal deviation present.   Cardiovascular: Normal rate and regular rhythm.    Pulmonary/Chest: Effort normal. No respiratory distress.   Coarse BSs on the left with diminished BSs at left base   Abdominal: Soft.   PEG in place   Neurological: She is alert.   Skin: Skin is warm and dry. She is not diaphoretic.   Vitals reviewed.      Results Review:  I have reviewed the labs, radiology results, and diagnostic studies.    Laboratory Data:     Results from last 7 days  Lab Units 10/14/17  0708 10/12/17  0903 10/11/17  0722   WBC 10*3/mm3 8.73 7.18 9.40   HEMOGLOBIN g/dL 12.0 11.4* 11.9*   HEMATOCRIT % 37.1 35.4* 36.5*   PLATELETS 10*3/mm3 193 198 204          Results from last 7  days  Lab Units 10/14/17  0708 10/12/17  0903 10/11/17  0722   SODIUM mmol/L 138 140 141   POTASSIUM mmol/L 4.0 3.5 3.6   CHLORIDE mmol/L 99 105 105   CO2 mmol/L 28.0 26.0 24.0   BUN mg/dL 12 7 9   CREATININE mg/dL 0.58 0.43* 0.54   CALCIUM mg/dL 9.6 9.1 8.8   BILIRUBIN mg/dL 0.2  --   --    ALK PHOS U/L 103  --   --    ALT (SGPT) U/L 31  --   --    AST (SGOT) U/L 30  --   --    GLUCOSE mg/dL 118* 108* 112*       Culture Data:        Radiology Data:   Imaging Results (last 24 hours)     ** No results found for the last 24 hours. **          I have reviewed the patient current medications.     Assessment/Plan     Hospital Problem List     * (Principal)Oropharyngeal dysphagia    Overview Signed 10/10/2017  7:34 AM by Josefina Barry MD     Added automatically from request for surgery [id number]         CVA (cerebral vascular accident)        Assessment:  1.  Multiple strokes in the left subcortical region  2.  History of traumatic brain injury  3.  Dysphagia requiring PEG placement  4.  Dysarthria  5.  Aspiration pneumonia - worsening left sided infiltrates/consolidation on 10/12 CXR with improvement on 10/14  6.  Right sided rib fractures  7. Elevated bp    Plan:  1.  Clindamycin dc'd and started on IV Cefepime (allergic rxn, switched to ronaldo) and Levaquin (day 4 today); patient has Vanco allergy  2.  Follow-up new respiratory culture positive fo rModerate growth (3+) Stenotrophomonas maltophilia (A)  3.  Repeat CXR-improvement   4.  Appreciate Pulmonary input; Scopolamine stopped  5.  TFs with Jevity  6.  Probiotic  7.  ASA therapy  8.  PT/OT/ST  9.  Dispo: [facility] at discharge-possibly however seems to be improving once abx were broadened. May need another few days with IV therapy and then d/c.   10.  Continue to try to limit sedating meds and follow along closely  11.  Add nebs (scheduled) including Mucomyst  12. Add low dose BB for continued elevated bp and mildly elevated HR  13. likely d/c in the  next 1-2 days on bactrim or levaquin monotherapy    Latrice Guillen MD   10/16/17   7:46 AM         Latrice Guillen MD  10/16/17  7:46 AM

## 2017-10-16 NOTE — PLAN OF CARE
Problem: Patient Care Overview (Adult)  Goal: Plan of Care Review  Outcome: Ongoing (interventions implemented as appropriate)    10/16/17 0934   Coping/Psychosocial Response Interventions   Plan Of Care Reviewed With patient   Patient Care Overview   Progress progress toward functional goals as expected   Outcome Evaluation   Outcome Summary/Follow up Plan ST completed re-evaluation for speech/language/cognition on 10/16/2017. Upon entering the room, pt was attempting to take her gown off and speaking in unintelligible jargon. ST attempted to cover pt back up but she shook her head to indicate no. Pt was unable to answer simple yes/no questions, follow directions, attend to speaker, or express thoughts/needs/wants. Pt presented with wet vocal quality, weak cough, and weak gurgly throat clear. ST attempted one PO trial of thin liquids via straw; pt bit down on straw and turned her head the opposite direction. Pt still not appropriate for hydration/nutrition by mouth.         Problem: Inpatient SLP  Goal: Expressive-Patient will improve expressive language skills to allow optimal participation in care  Outcome: Ongoing (interventions implemented as appropriate)    10/16/17 0934   Expressive- Optimal Participation in Care   Expressive Optimal Participation in Care- SLP, Date Established 10/16/17   Expressive Optimal Participation in Care- SLP, Time to Achieve by discharge   Expressive Optimal Participation in Care- SLP, Activity Level Patient will improve ability to construct phrase and sentence level responses;Patient will improve connected speech to express thoughts   Expressive Optimal Participation in Care- SLP, Date Goal Reviewed 10/16/17   Expressive Optimal Participation in Care- SLP, Outcome goal ongoing       Goal: Cognitive-linguistic-Patient will improve Cognitive-linguistic skills to allow optimal participation in care  Outcome: Ongoing (interventions implemented as appropriate)    10/16/17 0954    Cognitive Linguistic- Optimal Participation in Care   Cognitive Linguistic Optimal Participation in Care- SLP, Date Established 10/16/17   Cognitive Linguistic Optimal Participation in Care- SLP, Time to Achieve by discharge   Cognitive Linguistic Optimal Participation in Care- SLP, Activity Level Patient will improve attention skills for increased safety in environment;Patient will improve orientation for increased safety in environment;Patient will improve Executive functioning skills for increased safety in environment   Cognitive Linguistic Optimal Participation in Care- SLP, Date Goal Reviewed 10/16/17   Cognitive Linguistic Optimal Participation in Care- SLP, Outcome goal ongoing       Goal: Receptive Language-Patient will improve receptive language skills to allow optimal participation in care  Outcome: Ongoing (interventions implemented as appropriate)    10/16/17 0934   Receptive Language- Optimal Participation in Care   Receptive Language Optimal Participation in Care- SLP, Date Established 10/16/17   Receptive Language Optimal Participation in Care- SLP, Time to Achieve by discharge   Receptive Language Optimal Participation in Care- SLP, Activity Level Patient will improve ability to comprehend words/phrases/sentences;Patient will improve ability to follow directions;Patient will improve ability to comprehend questions   Receptive Language Optimal Participation in Care- SLP, Date Goal Reviewed 10/16/17   Receptive Language Optimal Participation in Care- SLP, Outcome goal ongoing

## 2017-10-17 PROCEDURE — 97168 OT RE-EVAL EST PLAN CARE: CPT | Performed by: OCCUPATIONAL THERAPIST

## 2017-10-17 PROCEDURE — 25010000002 MEROPENEM: Performed by: INTERNAL MEDICINE

## 2017-10-17 PROCEDURE — 94799 UNLISTED PULMONARY SVC/PX: CPT

## 2017-10-17 PROCEDURE — 92526 ORAL FUNCTION THERAPY: CPT

## 2017-10-17 PROCEDURE — G8987 SELF CARE CURRENT STATUS: HCPCS | Performed by: OCCUPATIONAL THERAPIST

## 2017-10-17 PROCEDURE — 25010000002 ENOXAPARIN PER 10 MG: Performed by: FAMILY MEDICINE

## 2017-10-17 PROCEDURE — 99232 SBSQ HOSP IP/OBS MODERATE 35: CPT | Performed by: PSYCHIATRY & NEUROLOGY

## 2017-10-17 PROCEDURE — 63710000001 PREDNISONE PER 5 MG: Performed by: INTERNAL MEDICINE

## 2017-10-17 PROCEDURE — 97535 SELF CARE MNGMENT TRAINING: CPT | Performed by: OCCUPATIONAL THERAPIST

## 2017-10-17 PROCEDURE — G8988 SELF CARE GOAL STATUS: HCPCS | Performed by: OCCUPATIONAL THERAPIST

## 2017-10-17 RX ORDER — LEVOFLOXACIN 750 MG/1
750 TABLET ORAL EVERY 24 HOURS
Status: DISCONTINUED | OUTPATIENT
Start: 2017-10-17 | End: 2017-10-19 | Stop reason: HOSPADM

## 2017-10-17 RX ADMIN — IPRATROPIUM BROMIDE AND ALBUTEROL SULFATE 3 ML: 2.5; .5 SOLUTION RESPIRATORY (INHALATION) at 15:07

## 2017-10-17 RX ADMIN — GABAPENTIN 300 MG: 300 CAPSULE ORAL at 08:25

## 2017-10-17 RX ADMIN — DANTROLENE SODIUM 25 MG: 25 CAPSULE ORAL at 15:51

## 2017-10-17 RX ADMIN — DANTROLENE SODIUM 25 MG: 25 CAPSULE ORAL at 08:18

## 2017-10-17 RX ADMIN — ACETYLCYSTEINE 1.5 ML: 200 SOLUTION ORAL; RESPIRATORY (INHALATION) at 08:03

## 2017-10-17 RX ADMIN — OXYBUTYNIN CHLORIDE 5 MG: 5 TABLET, FILM COATED, EXTENDED RELEASE ORAL at 08:18

## 2017-10-17 RX ADMIN — ACETYLCYSTEINE 1.5 ML: 200 SOLUTION ORAL; RESPIRATORY (INHALATION) at 20:13

## 2017-10-17 RX ADMIN — MEROPENEM 1 G: 1 INJECTION, POWDER, FOR SOLUTION INTRAVENOUS at 08:25

## 2017-10-17 RX ADMIN — POLYVINYL ALCOHOL 2 DROP: 14 SOLUTION/ DROPS OPHTHALMIC at 21:07

## 2017-10-17 RX ADMIN — NYSTATIN: 100000 POWDER TOPICAL at 21:07

## 2017-10-17 RX ADMIN — FLUOXETINE 10 MG: 10 CAPSULE ORAL at 08:18

## 2017-10-17 RX ADMIN — FUROSEMIDE 20 MG: 20 TABLET ORAL at 08:25

## 2017-10-17 RX ADMIN — METOPROLOL TARTRATE 12.5 MG: 25 TABLET, FILM COATED ORAL at 21:07

## 2017-10-17 RX ADMIN — IPRATROPIUM BROMIDE AND ALBUTEROL SULFATE 3 ML: 2.5; .5 SOLUTION RESPIRATORY (INHALATION) at 11:15

## 2017-10-17 RX ADMIN — PREDNISONE 3 MG: 1 TABLET ORAL at 08:18

## 2017-10-17 RX ADMIN — BACLOFEN 10 MG: 10 TABLET ORAL at 08:18

## 2017-10-17 RX ADMIN — ACETAMINOPHEN 650 MG: 650 SUPPOSITORY RECTAL at 23:34

## 2017-10-17 RX ADMIN — LEVOFLOXACIN 750 MG: 750 TABLET, FILM COATED ORAL at 19:56

## 2017-10-17 RX ADMIN — ACETAMINOPHEN 650 MG: 650 SUPPOSITORY RECTAL at 05:53

## 2017-10-17 RX ADMIN — MEROPENEM 1 G: 1 INJECTION, POWDER, FOR SOLUTION INTRAVENOUS at 00:05

## 2017-10-17 RX ADMIN — Medication 250 MG: at 08:18

## 2017-10-17 RX ADMIN — NYSTATIN: 100000 POWDER TOPICAL at 08:19

## 2017-10-17 RX ADMIN — DANTROLENE SODIUM 25 MG: 25 CAPSULE ORAL at 21:07

## 2017-10-17 RX ADMIN — Medication 250 MG: at 18:26

## 2017-10-17 RX ADMIN — GABAPENTIN 300 MG: 300 CAPSULE ORAL at 21:11

## 2017-10-17 RX ADMIN — METOPROLOL TARTRATE 12.5 MG: 25 TABLET, FILM COATED ORAL at 08:18

## 2017-10-17 RX ADMIN — Medication 81 MG: at 08:18

## 2017-10-17 RX ADMIN — IPRATROPIUM BROMIDE AND ALBUTEROL SULFATE 3 ML: 2.5; .5 SOLUTION RESPIRATORY (INHALATION) at 20:12

## 2017-10-17 RX ADMIN — ENOXAPARIN SODIUM 40 MG: 40 INJECTION SUBCUTANEOUS at 08:25

## 2017-10-17 RX ADMIN — IPRATROPIUM BROMIDE AND ALBUTEROL SULFATE 3 ML: 2.5; .5 SOLUTION RESPIRATORY (INHALATION) at 08:03

## 2017-10-17 NOTE — PLAN OF CARE
Problem: Patient Care Overview (Adult)  Goal: Plan of Care Review  Outcome: Ongoing (interventions implemented as appropriate)    10/17/17 1105   Coping/Psychosocial Response Interventions   Plan Of Care Reviewed With patient   Patient Care Overview   Progress no change   Outcome Evaluation   Outcome Summary/Follow up Plan Swallowing tx completed. Pt sleeping upon ST arrival. Pt was able to slightly arouse; however, fatigue persisted throughout session. Pt was unable to protrude tongue for oral motor strengthening task. With labial ice chip stim, pt was able to complete a swallow x2. Attempted /ah/ vocalizations for laryngeal closure, yet minimal vocalizations produced, which continue to be significantly weak at this time. ST to monitor; however, feel pt will likely warrant discharge from ST services if she continues to essentially show no noted improvement in fx or ability to participate in tx. Thanks!          Problem: Inpatient SLP  Goal: Dysphagia- Patient will improve swallowing skills to begin to take some PO safely  Outcome: Ongoing (interventions implemented as appropriate)    10/05/17 0910 10/17/17 1105   Begin to Take Some PO Safely   Begin to Take Some PO Safely- SLP, Date Established 10/05/17 --    Begin to Take Some PO Safely- SLP, Time to Achieve by discharge --    Begin to Take Some PO Safely- SLP, Activity Level Patient will improve oral skills for more efficient eating;Patient will improve timing of pharyngeal response for safer, more efficient swallow --    Begin to Take Some PO Safely- SLP, Additional Goal Pt will tolerate PO trials with SLP without s/s of aspiration --    Begin to Take Some PO Safely- SLP, Date Goal Reviewed --  10/17/17   Begin to Take Some PO Safely- SLP, Outcome goal ongoing --

## 2017-10-17 NOTE — PROGRESS NOTES
Continued Stay Note   Maria L     Patient Name: Pallavi Yee  MRN: [id number]  Today's Date: 10/17/2017    Admit Date: 10/4/2017          Discharge Plan       10/17/17 1415    Case Management/Social Work Plan    Plan [facility]    Additional Comments Per Dr. Keane, plan discharge to [facility] on Thursday, 10/19. Patient's mother has been updated. SW will follow to arrange discharge to [facility].               Discharge Codes     None            HUI Hyaes

## 2017-10-17 NOTE — PROGRESS NOTES
[facility] Medicine Services  INPATIENT PROGRESS NOTE    Length of Stay: 13  Date of Admission: 10/4/2017  Primary Care Physician: ROBERTH Beckham    Subjective   Chief Complaint: Follow-up stroke; aspiration PNA  HPI   Patient resting comfortably this afternoon.  Long conversation with patient's mother who reports the patient normally rests during the day, and tends to perk up and become more active at night.  Has a long-standing history of insomnia.  From a respiratory standpoint everything has remained stable.  She requires no supplemental oxygen.  No new complaints.  No acute events overnight per nursing.  Review of Systems     All pertinent negatives and positives are as above. All other systems have been reviewed and are negative unless otherwise stated.     Objective    Temp:  [97.9 °F (36.6 °C)-98.8 °F (37.1 °C)] 98.2 °F (36.8 °C)  Heart Rate:  [] 68  Resp:  [18] 18  BP: (130-151)/(69-91) 142/78  Physical Exam   Constitutional: No distress.   Alert   HENT:   Head: Normocephalic.   Mouth/Throat: No oropharyngeal exudate (weak cough; some oral secretions noted).   Eyes: Pupils are equal, round, and reactive to light. No scleral icterus.   Neck: No tracheal deviation present.   Cardiovascular: Normal rate and regular rhythm.    Pulmonary/Chest: Effort normal. No respiratory distress.   Coarse BSs on the left with diminished BSs at left base; upper airway noise   Abdominal: Soft.   PEG in place   Neurological: She is alert.   Skin: Skin is warm and dry. She is not diaphoretic.   Vitals reviewed.      Results Review:  I have reviewed the labs, radiology results, and diagnostic studies.    Laboratory Data:     Results from last 7 days  Lab Units 10/14/17  0708 10/12/17  0903 10/11/17  0722   WBC 10*3/mm3 8.73 7.18 9.40   HEMOGLOBIN g/dL 12.0 11.4* 11.9*   HEMATOCRIT % 37.1 35.4* 36.5*   PLATELETS 10*3/mm3 193 198 204          Results from last 7 days  Lab Units  10/14/17  0708 10/12/17  0903 10/11/17  0722   SODIUM mmol/L 138 140 141   POTASSIUM mmol/L 4.0 3.5 3.6   CHLORIDE mmol/L 99 105 105   CO2 mmol/L 28.0 26.0 24.0   BUN mg/dL 12 7 9   CREATININE mg/dL 0.58 0.43* 0.54   CALCIUM mg/dL 9.6 9.1 8.8   BILIRUBIN mg/dL 0.2  --   --    ALK PHOS U/L 103  --   --    ALT (SGPT) U/L 31  --   --    AST (SGOT) U/L 30  --   --    GLUCOSE mg/dL 118* 108* 112*       Culture Data:        Radiology Data:   Imaging Results (last 24 hours)     ** No results found for the last 24 hours. **          I have reviewed the patient current medications.     Assessment/Plan     Hospital Problem List     * (Principal)Oropharyngeal dysphagia    Overview Signed 10/10/2017  7:34 AM by Josefina Barry MD     Added automatically from request for surgery [id number]         CVA (cerebral vascular accident)        Assessment:  1.  Multiple strokes in the left subcortical region  2.  History of traumatic brain injury  3.  Dysphagia requiring PEG placement  4.  Dysarthria  5.  Aspiration pneumonia - worsening left sided infiltrates/consolidation; sputum culture positive for Stenotrophomonas.  6.  Right sided rib fractures    Plan:  1.  D/C [facility] today  2.  IV to G-tube Levaquin today; will hold TFs two hours before and two hours after Levaquin administration  3.  Updated patient's mother via teleconference today  4.  Repeat CXR appears to be improving  5.  Pulmonary signed off  6.  TFs with Jevity  7.  Probiotic  8.  ASA therapy  9.  PT/OT/ST  10.  Dispo: [facility] now anticipated for this Thursday  11.  Continue to try to limit sedating meds and follow along closely  12.  Continue nebs (scheduled) including Mucomyst    Dami Keane MD   10/17/17   1:20 PM

## 2017-10-17 NOTE — PROGRESS NOTES
Neurology Progress Note      Date of admission: 10/4/2017  5:24 PM  Date of visit: 10/17/2017    Chief Complaint:  Strokes    Subjective     Subjective:  No changes today.  No complaints.    Medications:  Current Facility-Administered Medications   Medication Dose Route Frequency Provider Last Rate Last Dose   • acetaminophen (TYLENOL) suppository 650 mg  650 mg Rectal Q4H PRN Latrice Guillen MD   650 mg at 10/17/17 0553   • acetylcysteine (MUCOMYST) 20 % nebulizer solution 1.5 mL  1.5 mL Nebulization BID - RT Steve Friend MD   1.5 mL at 10/17/17 0803   • aspirin chewable tablet 81 mg  81 mg Oral Daily Albert Kraft MD   81 mg at 10/17/17 0818    Or   • aspirin suppository 300 mg  300 mg Rectal Daily Albert Kraft MD   300 mg at 10/10/17 1220   • baclofen (LIORESAL) tablet 10 mg  10 mg Oral Daily Dami Keane MD   10 mg at 10/17/17 0818   • bisacodyl (DULCOLAX) suppository 10 mg  10 mg Rectal Daily PRN Humberto Russell MD   10 mg at 10/16/17 0623   • dantrolene (DANTRIUM) capsule 25 mg  25 mg Oral TID Dylan Johnson DO   25 mg at 10/17/17 0818   • enoxaparin (LOVENOX) syringe 40 mg  40 mg Subcutaneous Daily Latrice Guillen MD   40 mg at 10/17/17 0825   • FLUoxetine (PROzac) capsule 10 mg  10 mg Oral Daily Dylan Johnson DO   10 mg at 10/17/17 0818   • furosemide (LASIX) tablet 20 mg  20 mg Per G Tube Daily Dylan Johnson DO   20 mg at 10/17/17 0825   • gabapentin (NEURONTIN) capsule 300 mg  300 mg Oral Q12H Dami Keane MD   300 mg at 10/17/17 0825   • iopamidol (ISOVUE-370) 76 % injection 150 mL  150 mL Intravenous Once in imaging Humberto Russell MD       • ipratropium-albuterol (DUO-NEB) nebulizer solution 3 mL  3 mL Nebulization 4x Daily - RT Dami Keane MD   3 mL at 10/17/17 1115   • labetalol (NORMODYNE,TRANDATE) injection 10 mg  10 mg Intravenous Q10 Min PRN Albert Kraft MD       • levoFLOXacin (LEVAQUIN) 750 mg/150 mL D5W (premix)  (LEVAQUIN) 750 mg  750 mg Intravenous Q24H Latrice Guillen MD   750 mg at 10/16/17 1948   • meropenem (MERREM) 1 g/100 mL 0.9% NS VTB (mbp)  1 g Intravenous Q8H Dami Keane MD 0 mL/hr at 10/15/17 0137 1 g at 10/17/17 0825   • metoprolol tartrate (LOPRESSOR) tablet 12.5 mg  12.5 mg Oral Q12H Latrice Guillen MD   12.5 mg at 10/17/17 0818   • nystatin (MYCOSTATIN) powder   Topical Q12H Dylan Johnson DO       • ondansetron (ZOFRAN) injection 4 mg  4 mg Intravenous Q6H PRN Albert Kraft MD       • oxybutynin XL (DITROPAN-XL) 24 hr tablet 5 mg  5 mg Oral Daily Dylan Johnson DO   5 mg at 10/17/17 0818   • Pharmacy to Dose meropenem (MERREM)   Does not apply Continuous PRN Dami Keane MD       • polyvinyl alcohol (LIQUIFILM) 1.4 % ophthalmic solution 2 drop  2 drop Both Eyes Nightly Dylan Johnson DO   2 drop at 10/16/17 2105   • predniSONE (DELTASONE) tablet 3 mg  3 mg Oral Daily Dylan Johnson DO   3 mg at 10/17/17 0818   • saccharomyces boulardii (FLORASTOR) capsule 250 mg  250 mg Per G Tube BID Dami Keane MD   250 mg at 10/17/17 0818   • senna (SENOKOT) tablet 17.2 mg  2 tablet Oral Daily PRN Dylan Johnson DO   17.2 mg at 10/15/17 1811   • sodium chloride 0.9 % flush 1-10 mL  1-10 mL Intravenous PRN Latrice Guillen MD       • sodium chloride 0.9 % flush 1-10 mL  1-10 mL Intravenous PRN Albert Kraft MD           Review of Systems:   -A 14 point review of systems is completed and is difficult to obtain    Objective     Objective      Vital Signs  Temp:  [97.9 °F (36.6 °C)-98.8 °F (37.1 °C)] 98 °F (36.7 °C)  Heart Rate:  [] 66  Resp:  [18] 18  BP: (130-151)/(69-91) 135/69    Physical Exam:    HEENT:  Neck supple  CVS:  Regular rate and rhythm.  No murmurs  Carotid Examination:  No bruits  Lungs:  Clear to auscultation  Abdomen:  Non-tender, Non-distended  Extremities:  No signs of peripheral edema    Neurologic Exam:    -Awakens but drowsy, Very  dysarthric.  Says yes and no appropriately  -Not following commands    Cranial nerves II through XII about the same.    Motor: Moving right upper and right lower extremity well.  Not moving her left upper extremity.  --contractures of left upper extremity    Results Review:    I reviewed the patient's new clinical results.    Lab Results (last 24 hours)     ** No results found for the last 24 hours. **        Imaging Results (last 24 hours)     ** No results found for the last 24 hours. **        Echocardiogram:   · Left ventricular wall thickness is consistent with mild concentric hypertrophy.  · Left ventricular systolic function is normal. Estimated EF appears to be in the range of 61 - 65%.  · No evidence of a patent foramen ovale.  · No significant valvular abnormalities are identified.    CTA of neck reviewed showing no significant stenosis  Assessment/Plan     Hospital Problem List    Principal Problem:    Oropharyngeal dysphagia  Active Problems:    CVA (cerebral vascular accident)    Impression:  1. Multiple acute ischemic strokes..  2. Left internal carotid artery stenosis at 50 to 69%.  3.  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  4.  Aspiration pneumonia  5.  Dysarthria  6.  Dysphagia requiring PEG placement    Plan:  Continue aspirin   Lipitor at discharge  Smoking cessation counseling when she is able to comprehend.   Continue PT/OT  PEG in place and working  Discharge planning ongoing.  Patient is clinically improving.    Albert Kraft MD  10/17/17  11:44 AM

## 2017-10-17 NOTE — THERAPY RE-EVALUATION
Acute Care - Occupational Therapy Initial Evaluation  [facility]     Patient Name: Pallavi Yee  : 1980  MRN: [id number]  Today's Date: 10/17/2017  Onset of Illness/Injury or Date of Surgery Date: 10/04/17  Date of Referral to OT: 10/05/17  Referring Physician: Dr. Guillen    Admit Date: 10/4/2017       ICD-10-CM ICD-9-CM   1. Oropharyngeal dysphagia R13.12 787.22   2. Mobility impaired Z74.09 799.89   3. Impaired mobility and ADLs Z74.09 799.89   4. Cognitive impairment R41.89 294.9     Patient Active Problem List   Diagnosis   • CVA (cerebral vascular accident)   • Oropharyngeal dysphagia     Past Medical History:   Diagnosis Date   • Depression    • Left-sided weakness    • Lupus    • MRSA (methicillin resistant staph aureus) culture positive    • TBI (traumatic brain injury)      Past Surgical History:   Procedure Laterality Date   •  SECTION     • ENDOSCOPY N/A 10/10/2017    Procedure: ESOPHAGOGASTRODUODENOSCOPY WITH ANESTHESIA;  Surgeon: Josefina Barry MD;  Location: [facility] ENDOSCOPY;  Service:    • PEG TUBE INSERTION N/A 10/10/2017    Procedure: PERCUTANEOUS ENDOSCOPIC GASTROSTOMY TUBE INSERTION;  Surgeon: Josefina Barry MD;  Location: [facility] ENDOSCOPY;  Service:    • TRACHEOSTOMY            OT ASSESSMENT FLOWSHEET (last 72 hours)      OT Evaluation       10/17/17 1535 10/17/17 1045 10/16/17 0906 10/15/17 2044 10/15/17 0805    Rehab Evaluation    Document Type re-evaluation   see MAR  -MM therapy note (daily note)  -TM re-evaluation  -EA      Subjective Information complains of;agree to therapy;pain  -MM unable to respond  -TM unable to respond   pt using unintelligible jargon  -EA      Patient Effort, Rehab Treatment  poor  -TM poor  -EA      General Information    Patient Profile Review yes  -MM        Onset of Illness/Injury or Date of Surgery Date 10/04/17  -MM        Referring Physician Dr. Guillen  -MM        General Observations side lying to L side, asleep but easy to awaken, more alert  than previous txs  -MM        Precautions/Limitations fall precautions;other (see comments)   PEG  -MM        Plans/Goals Discussed With patient;agreed upon  -MM        Risks Reviewed patient:;LOB;nausea/vomiting;dizziness;increased discomfort;change in vital signs;increased drainage;lines disloged  -MM        Benefits Reviewed patient:;improve function;increase independence;increase strength;increase balance;decrease pain;decrease risk of DVT;increase knowledge;improve skin integrity  -MM        Barriers to Rehab medically complex;previous functional deficit;cognitive status;physical barrier  -MM        Living Environment    Lives With   facility resident  -EA      Living Arrangements   group home  -EA      Living Environment Comment living environment and history remain largely unchanged since initial evaluation  -MM        Clinical Impression    Date of Referral to OT 10/05/17  -MM        OT Diagnosis impaired mobility and adls  -MM        Functional Level At Time Of Evaluation fair  -MM        Impairments Found (describe specific impairments) ergonomics and body mechanics;gait, locomotion, and balance;arousal, attention, and cognition;joint integrity and mobility;motor function;muscle performance  -MM        Patient/Family Goals Statement return to facility  -MM        Criteria for Skilled Therapeutic Interventions Met yes;treatment indicated  -MM        Rehab Potential good, to achieve stated therapy goals  -MM        Therapy Frequency 3-5 times/wk  -MM        Predicted Duration of Therapy Intervention (days/wks) until d/c  -MM        Anticipated Discharge Disposition skilled nursing facility;extended care facility  -MM        Pain Assessment    Pain Assessment Peterson-Cuevas FACES  -MM        Peterson-Cuevas FACES Pain Rating 6  -MM        Pain Type Acute pain  -MM        Pain Location Neck  -MM        Pain Orientation Left  -MM        Pain Frequency Constant/continuous  -MM        Pain Intervention(s) Medication (See  MAR);Repositioned  -MM        Response to Interventions increased with movement  -MM        Cognitive Assessment/Intervention    Current Cognitive/Communication Assessment impaired  -MM  impaired  -EA      Orientation Status oriented to;person  -MM  unable/difficult to assess  -EA      Follows Commands/Answers Questions able to follow single-step instructions;needs cueing;needs increased time;needs repetition;50% of the time;75% of the time  -MM  unable to answer questions  -EA      Personal Safety moderate impairment;decreased awareness, need for assist;decreased awareness, need for safety;decreased insight to deficits  -MM  unable/difficult to assess  -EA      ROM (Range of Motion)    General ROM Detail RUE AROM WFL, LUE PROM 50% impaired. BLE AROM WFL  -MM        MMT (Manual Muscle Testing)    General MMT Assessment Detail RUE 4-/5, difficult to assess LUE this date  -MM        Muscle Tone Assessment    Muscle Tone Assessment LUE;LLE  -MM   LUE;LLE  -TC LUE;LLE  -KP    LUE Muscle Tone Assessment moderately increased tone  -MM   moderately increased tone  -TC moderately increased tone  -KP    LLE Muscle Tone Assessment mildly increased tone  -MM   mildly increased tone  -TC mildly increased tone  -KP    Bed Mobility, Assessment/Treatment    Bed Mobility, Assistive Device bed rails;draw sheet  -MM        Bed Mobility, Roll Left, [place] moderate assist (50% patient effort);2 person assist required;verbal cues required  -MM        Bed Mobility, Roll Right, [place] maximum assist (25% patient effort);dependent (less than 25% patient effort);2 person assist required;verbal cues required  -MM        Bed Mobility, Scoot/Bridge, [place] dependent (less than 25% patient effort);2 person assist required;verbal cues required  -MM        Bed Mobility, Safety Issues decreased use of arms for pushing/pulling;decreased use of legs for bridging/pushing;cognitive deficits limit understanding;impaired trunk control  for bed mobility  -MM        Bed Mobility, Impairments strength decreased;muscle tone abnormal;coordination impaired  -MM        Upper Body Dressing Assessment/Training    UB Dressing Assess/Train, Clothing Type doffing:;donning:;hospital gown  -MM        UB Dressing Assess/Train, Position supine  -MM        UB Dressing Assess/Train, [place] moderate assist (50% patient effort);verbal cues required  -MM        UB Dressing Assess/Train, Impairments muscle tone abnormal;ROM decreased  -MM        Toileting Assessment/Training    Toileting Assess/Train, Position supine  -MM        Toileting Assess/Train, Indepen Level maximum assist (25% patient effort);dependent (less than 25% patient effort);2 person assist required;verbal cues required  -MM        Toileting Assess/Train, Impairments muscle tone abnormal;decreased flexibility;coordination impaired;strength decreased;motor control impaired;ROM decreased  -MM        General Therapy Interventions    Planned Therapy Interventions activity intolerance;adaptive equipment training;ADL retraining;balance training;bed mobility training;energy conservation;fine motor coordination training;home exercise program;motor coordination training;neuromuscular re-education;strengthening;stretching;ROM (Range of Motion)  -MM        Positioning and Restraints    Pre-Treatment Position in bed  -MM        Post Treatment Position bed  -MM        In Bed side lying right;call light within reach;encouraged to call for assist;notified nsg;side rails up x3   Pillows under BLE and L side  -MM          10/14/17 2042                Muscle Tone Assessment    Muscle Tone Assessment LUE;LLE  -TC        LUE Muscle Tone Assessment moderately increased tone  -TC        LLE Muscle Tone Assessment mildly increased tone  -TC          User Key  (r) = Recorded By, (t) = Taken By, (c) = Cosigned By    Initials Name Effective Dates    TM Fatuma Leger CCC-SLP 08/02/16 -     TC Tamara K Castleman, RN  08/02/16 -      Mahsa Hardwick, LPN 08/02/16 -     MM Sean Ambriz, OTR/L 10/21/16 -     EA Adelaida Hester, MS, CFY-SLP 02/21/17 -            Occupational Therapy Education     Title: PT OT SLP Therapies (Active)     Topic: Occupational Therapy (Active)     Point: ADL training (Active)    Description: Instruct learner(s) on proper safety adaptation and remediation techniques during self care or transfers.   Instruct in proper use of assistive devices.    Learning Progress Summary    Learner Readiness Method Response Comment Documented by Status   Patient Acceptance E VU OT Cont POC, benefits of activity MM 10/17/17 1629 Done    Acceptance E NR benefits of activity MM 10/11/17 1435 Active    Acceptance E,D VU,NR ADLs, ROM TS 10/07/17 1007 Done    Acceptance E NR pt and facility staff educated on OT role, benefits.  10/05/17 1613 Active   Other Acceptance E NR pt and facility staff educated on OT role, benefits. MM 10/05/17 1613 Active               Point: Home exercise program (Done)    Description: Instruct learner(s) on appropriate technique for monitoring, assisting and/or progressing therapeutic exercises/activities.    Learning Progress Summary    Learner Readiness Method Response Comment Documented by Status   Patient Acceptance E,D VU,DU,NR Prom Lue/Aarom Rue with vc's!  10/10/17 1124 Done               Point: Precautions (Done)    Description: Instruct learner(s) on prescribed precautions during self-care and functional transfers.    Learning Progress Summary    Learner Readiness Method Response Comment Documented by Status   Patient Acceptance E,D VU,DU,NR Prom Lue/Aarom Rue with vc's!  10/10/17 1124 Done               Point: Body mechanics (Done)    Description: Instruct learner(s) on proper positioning and spine alignment during self-care, functional mobility activities and/or exercises.    Learning Progress Summary    Learner Readiness Method Response Comment Documented by Status   Patient  Acceptance E,D VU,DU,NR Prom Lue/Reina Lundberge with vc's!  10/10/17 1124 Done                      User Key     Initials Effective Dates Name Provider Type Discipline     08/02/16 -  Antwan Hobbs SERNA/L Occupational Therapy Assistant OT    TS 08/02/16 -  KAREEM Luke/L Occupational Therapy Assistant OT    MM 10/21/16 -  Sean Ambriz, OTR/L Occupational Therapist OT                  OT Recommendation and Plan  Anticipated Discharge Disposition: skilled nursing facility, extended care facility  Planned Therapy Interventions: activity intolerance, adaptive equipment training, ADL retraining, balance training, bed mobility training, energy conservation, fine motor coordination training, home exercise program, motor coordination training, neuromuscular re-education, strengthening, stretching, ROM (Range of Motion)  Therapy Frequency: 3-5 times/wk  Plan of Care Review  Plan Of Care Reviewed With: patient  Progress: progress toward functional goals as expected  Outcome Summary/Follow up Plan: OT re-evaluation completed d/t time. pt was more alert this date. Pt was oriented to person. pt's speech was difficult to understand but pt was able to carry on a conversation with OT and nsg. Pt was max/dep for toileting in supine. pt was mod x2 to roll to L side. Pt was max x2 to roll to R side. Pt was dependent to scoot to HOB. Skilled OT recommended to cont to address adls, bed mobility, strength, education, coordination and ROM. Recommended d/c SNF.          OT Goals       10/17/17 1629 10/05/17 1615       Bed Mobility OT LTG    Bed Mobility OT LTG, Date Established  10/05/17  -MM     Bed Mobility OT LTG, Time to Achieve  by discharge  -MM     Bed Mobility OT LTG, Activity Type  all bed mobility  -MM     Bed Mobility OT LTG, [place] Level  maximum assist (25% patient effort)  -MM     Bed Mobility OT LTG, Assist Device  bed rails  -MM     Bed Mobility OT LTG, Date Goal Reviewed 10/17/17  -MM      Bed  Mobility OT LTG, Outcome goal ongoing  -MM      Bed Mobility OT LTG, Reason Goal Not Met progress slower than expected  -MM      Range of Motion OT LTG    Range of Motion Goal OT LTG, Date Established  10/05/17  -MM     Range of Motion Goal OT LTG, Time to Achieve  by discharge  -MM     Range of Motion Goal OT LTG, AROM Measure  Pt will progress with ROM to L UE/LE to decrease tone and prevent contracture.  -MM     Range of Motion Goal OT LTG, Date Goal Reviewed 10/17/17  -MM      Range of Motion Goal OT LTG, Outcome goal ongoing  -MM      Reason Goal Not Met (ROM) OT LTG progress slower than expected  -MM      Eating Self-Feeding OT LTG    Eat Self Feeding Goal OT LTG, Date Established  10/05/17  -MM     Eat Self Feeding Goal OT LTG, Time to Achieve  by discharge  -MM     Eat Self Feed Goal OT LTG, [place] Level  supervision required;set up required  -MM     Eat Self Feeding Goal OT LTG, Adaptive Equip  utensils, large   -MM     Eat Self Feeding Goal OT LTG, Position  reclined, sitting in bed  -MM     Eat Self Feeding Goal OT LTG, Date Goal Review 10/17/17  -MM      Eat Self Feeding Goal OT LTG, Outcome goal no longer appropriate  -MM      Eat Self Feeding Goal OT LTG, Reason Not Met other (see comments)   Pt currently has PEG tube  -MM      Grooming OT LTG    Grooming Goal OT LTG, Date Established  10/05/17  -MM     Grooming Goal OT LTG, Time to Achieve  by discharge  -MM     Grooming Goal OT LTG, [place] Level  moderate assist (50% patient effort)  -MM     Grooming Goal OT LTG, Position  reclined, sitting in bed  -MM     Grooming Goal OT LTG, Date Goal Reviewed 10/17/17  -MM      Grooming Goal OT LTG, Outcome goal ongoing  -MM      Grooming Goal OT LTG, Reason Goal Not Met progress slower than expected  -MM      Coordination OT LTG    Coordination OT LTG, Date Established 10/17/17  -MM      Coordination OT LTG, Time to Achieve by discharge  -MM      Coordination OT LTG, Activity Type GM written ex  program;FM task;GM task;FM written ex program  -MM      Coordination OT LTG, [place] Level min assist;with verbal cues  -MM        User Key  (r) = Recorded By, (t) = Taken By, (c) = Cosigned By    Initials Name Provider Type    NAHOMI Marshall/L Occupational Therapist                Outcome Measures       10/17/17 1600          How much help from another is currently needed...    Putting on and taking off regular lower body clothing? 1  -MM      Bathing (including washing, rinsing, and drying) 1  -MM      Toileting (which includes using toilet bed pan or urinal) 1  -MM      Putting on and taking off regular upper body clothing 2  -MM      Taking care of personal grooming (such as brushing teeth) 2  -MM      Eating meals 1  -MM      Score 8  -MM      Functional Assessment    Outcome Measure Options AM-PAC 6 Clicks Daily Activity (OT)  -MM        User Key  (r) = Recorded By, (t) = Taken By, (c) = Cosigned By    Initials Name Provider Type    AUBREY Ambriz OTR/L Occupational Therapist          Time Calculation:   OT Start Time: 1535  OT Stop Time: 1619  OT Time Calculation (min): 44 min    Therapy Charges for Today     Code Description Service Date Service Provider Modifiers Qty    [id number]  OT SELFCARE CURRENT 10/17/2017 NAHOMI Snow/L GO, CM 1    [id number]  OT SELFCARE PROJECTED 10/17/2017 NAHOMI Snow/L GO, CL 1    [id number]  OT RE-EVAL 2 10/17/2017 NAHOMI Snow/L GO, KX 1    [id number]  OT SELF CARE/MGMT/TRAIN EA 15 MIN 10/17/2017 NAHOMI Snow/L GO, KX 1          OT G-codes  OT Professional Judgement Used?: Yes  OT Functional Scales Options: AM-PAC 6 Clicks Daily Activity (OT)  Score: 8  Functional Limitation: Self care  Self Care Current Status (): At least 80 percent but less than 100 percent impaired, limited or restricted  Self Care Goal Status (): At least 60 percent but less than 80 percent impaired, limited or  restricted    Sean Ambriz, OTR/L  10/17/2017

## 2017-10-17 NOTE — PLAN OF CARE
Problem: Patient Care Overview (Adult)  Goal: Plan of Care Review  Outcome: Ongoing (interventions implemented as appropriate)    10/17/17 1700   Coping/Psychosocial Response Interventions   Plan Of Care Reviewed With patient   Patient Care Overview   Progress improving   Outcome Evaluation   Outcome Summary/Follow up Plan Patient has slept off and on between care. Patient has maintained safety with no complaint of pain. Patient tolerates her tube feeding, meds crushed in g-tube, patient turned from side to side with hob 30 degrees, vss will continue to monitor.          Problem: Stroke (Ischemic) (Adult)  Goal: Signs and Symptoms of Listed Potential Problems Will be Absent or Manageable (Stroke)  Outcome: Ongoing (interventions implemented as appropriate)    Problem: Fall Risk (Adult)  Goal: Absence of Falls  Outcome: Ongoing (interventions implemented as appropriate)    Problem: Wound, Traumatic, Nonburn (Adult)  Goal: Signs and Symptoms of Listed Potential Problems Will be Absent or Manageable (Wound, Traumatic, Nonburn)  Outcome: Ongoing (interventions implemented as appropriate)    Problem: Pressure Ulcer Risk (Chuy Scale) (Adult,Obstetrics,Pediatric)  Goal: Skin Integrity  Outcome: Ongoing (interventions implemented as appropriate)    Problem: Nutrition, Enteral (Adult)  Goal: Signs and Symptoms of Listed Potential Problems Will be Absent or Manageable (Nutrition, Enteral)  Outcome: Ongoing (interventions implemented as appropriate)

## 2017-10-17 NOTE — THERAPY TREATMENT NOTE
Acute Care - Speech Language Pathology   Swallow Treatment Note [facility]     Patient Name: Pallavi Yee  : 1980  MRN: [id number]  Today's Date: 10/17/2017  Onset of Illness/Injury or Date of Surgery Date: 10/04/17  Date of Referral to SLP: 10/04/17  Referring Physician: Dr. Guillen      Admit Date: 10/4/2017   Swallowing tx completed. Pt sleeping upon ST arrival. Pt was able to slightly arouse; however, fatigue persisted throughout session. Pt was unable to protrude tongue for oral motor strengthening task. With labial ice chip stim, pt was able to complete a swallow x2. Attempted /ah/ vocalizations for laryngeal closure, yet minimal vocalizations produced, which continue to be significantly weak at this time. ST to monitor; however, feel pt will likely warrant discharge from ST services if she continues to essentially show no noted improvement in fx or ability to participate in tx. Thanks! Fatuma Leger CCC-SLP 10/17/2017 11:09 AM  Visit Dx:      ICD-10-CM ICD-9-CM   1. Oropharyngeal dysphagia R13.12 787.22   2. Mobility impaired Z74.09 799.89   3. Impaired mobility and ADLs Z74.09 799.89   4. Cognitive impairment R41.89 294.9     Patient Active Problem List   Diagnosis   • CVA (cerebral vascular accident)   • Oropharyngeal dysphagia             Adult Rehabilitation Note       10/17/17 1045          Rehab Assessment/Intervention    Discipline speech language pathologist  -TM      Document Type therapy note (daily note)  -TM      Subjective Information unable to respond  -TM      Patient Effort, Rehab Treatment poor  -TM      Recorded by [TM] YVETTE Traore      Improve oral skills    Oral Skills Progress 0%;with inconsistent cues  -TM      Comments: Improve Oral Skills Pt was unable to protrude tongue despite max verbal cues.   -TM      Recorded by [TM] YVETTE Traore      Improve timing of pharyngeal response    Timing of Pharyngeal Response Progress 20%;with consistent cues  -TM       Comments: Improve timing of pharyngeal response Pt was presented multiple trials of labial ice chip stimulation with verbal cues to complete swallow. Pt was able to complete a swallow x2, one of which was completed within two seconds of cue.   -TM      Recorded by [TM] YVETTE Traore      Improve laryngeal closure    Laryngeal Closure Progress 10%;with consistent cues;following model  -TM      Comments: Improve laryngeal closure Pt was provided max verbal cues and model to complete laryngeal adduction tasks through /ah/ vocalizations. Pt was only able to vocalize x3, with weak productions noted.  -TM      Recorded by [TM] YVETTE Traore        User Key  (r) = Recorded By, (t) = Taken By, (c) = Cosigned By    Initials Name Effective Dates    TM YVETTE Traore 08/02/16 -                   IP SLP Goals       10/17/17 1105 10/16/17 0934 10/13/17 1218    Begin to Take Some PO Safely    Begin to Take Some PO Safely- SLP, Date Goal Reviewed 10/17/17  -TM  10/13/17  -TM    Receptive Language- Optimal Participation in Care    Receptive Language Optimal Participation in Care- SLP, Date Established  10/16/17  -EA     Receptive Language Optimal Participation in Care- SLP, Time to Achieve  by discharge  -EA     Receptive Language Optimal Participation in Care- SLP, Activity Level  Patient will improve ability to comprehend words/phrases/sentences;Patient will improve ability to follow directions;Patient will improve ability to comprehend questions  -EA     Receptive Language Optimal Participation in Care- SLP, Date Goal Reviewed  10/16/17  -EA     Receptive Language Optimal Participation in Care- SLP, Outcome  goal ongoing  -EA     Expressive- Optimal Participation in Care    Expressive Optimal Participation in Care- SLP, Date Established  10/16/17  -EA     Expressive Optimal Participation in Care- SLP, Time to Achieve  by discharge  -EA     Expressive Optimal Participation in Care- SLP, Activity Level   Patient will improve ability to construct phrase and sentence level responses;Patient will improve connected speech to express thoughts  -EA     Expressive Optimal Participation in Care- SLP, Date Goal Reviewed  10/16/17  -EA     Expressive Optimal Participation in Care- SLP, Outcome  goal ongoing  -EA     Cognitive Linguistic- Optimal Participation in Care    Cognitive Linguistic Optimal Participation in Care- SLP, Date Established  10/16/17  -EA     Cognitive Linguistic Optimal Participation in Care- SLP, Time to Achieve  by discharge  -EA     Cognitive Linguistic Optimal Participation in Care- SLP, Activity Level  Patient will improve attention skills for increased safety in environment;Patient will improve orientation for increased safety in environment;Patient will improve Executive functioning skills for increased safety in environment  -EA     Cognitive Linguistic Optimal Participation in Care- SLP, Date Goal Reviewed  10/16/17  -EA     Cognitive Linguistic Optimal Participation in Care- SLP, Outcome  goal ongoing  -EA       10/11/17 1253 10/06/17 1135 10/05/17 0910    Begin to Take Some PO Safely    Begin to Take Some PO Safely- SLP, Date Established   10/05/17  -BN    Begin to Take Some PO Safely- SLP, Time to Achieve   by discharge  -BN    Begin to Take Some PO Safely- SLP, Activity Level   Patient will improve oral skills for more efficient eating;Patient will improve timing of pharyngeal response for safer, more efficient swallow  -BN    Begin to Take Some PO Safely- SLP, Additional Goal   Pt will tolerate PO trials with SLP without s/s of aspiration  -BN    Begin to Take Some PO Safely- SLP, Date Goal Reviewed 10/11/17  -KW 10/06/17  -TM 10/05/17  -BN    Begin to Take Some PO Safely- SLP, Outcome   goal ongoing  -BN      User Key  (r) = Recorded By, (t) = Taken By, (c) = Cosigned By    Initials Name Provider Type    EDELMIRA Leger, CCC-SLP Speech and Language Pathologist    KW Malika Leong, MS  CCC-SLP Speech and Language Pathologist    JOHN Aguirre, CCC-SLP Speech and Language Pathologist    CRESCENCIO Hester MS, CFY-SLP Speech and Language Pathologist          EDUCATION  The patient has been educated in the following areas:   Dysphagia (Swallowing Impairment).    SLP Recommendation and Plan                                           Plan of Care Review  Plan Of Care Reviewed With: patient  Progress: no change  Outcome Summary/Follow up Plan: Swallowing tx completed. Pt sleeping upon ST arrival. Pt was able to slightly arouse; however, fatigue persisted throughout session. Pt was unable to protrude tongue for oral motor strengthening task. With labial ice chip stim, pt was able to complete a swallow x2. Attempted /ah/ vocalizations for laryngeal closure, yet minimal vocalizations produced, which continue to be significantly weak at this time. ST to monitor; however, feel pt will likely warrant discharge from ST services if she continues to essentially show no noted improvement in fx or ability to participate in tx. Thanks!        SLP Outcome Measures (last 72 hours)      SLP Outcome Measures       10/16/17 0900          SLP Outcome Measures    Outcome Measure Used? Adult NOMS  -EA      FCM Scores    Spoken Language Expression FCM Score 1  -EA        User Key  (r) = Recorded By, (t) = Taken By, (c) = Cosigned By    Initials Name Effective Dates    CRESCENCIO Hester, MS, CFY-SLP 02/21/17 -              Time Calculation:         Time Calculation- SLP       10/17/17 1108          Time Calculation- SLP    SLP Start Time 1045  -TM      SLP Stop Time 1053  -TM      SLP Time Calculation (min) 8 min  -TM      SLP Received On 10/17/17  -        User Key  (r) = Recorded By, (t) = Taken By, (c) = Cosigned By    Initials Name Provider Type     Fatuma Leger, CCC-SLP Speech and Language Pathologist          Therapy Charges for Today     Code Description Service Date Service Provider Modifiers Qty     [id number]  ST TREATMENT SWALLOW 1 10/17/2017 Fatuma Leger CCC-SLP GN, KX 1          SLP G-Codes  SLP NOMS Used?: Yes  Functional Limitations: Spoken language expressive  Swallow Current Status (): At least 80 percent but less than 100 percent impaired, limited or restricted  Swallow Goal Status (): At least 60 percent but less than 80 percent impaired, limited or restricted  Spoken Language Expression Current Status (): At least 80 percent but less than 100 percent impaired, limited or restricted  Spoken Language Expression Goal Status (): At least 80 percent but less than 100 percent impaired, limited or restricted  Spoken Language Expression Discharge Status (): At least 80 percent but less than 100 percent impaired, limited or restricted      YVETTE Hart  10/17/2017

## 2017-10-17 NOTE — PLAN OF CARE
Problem: Patient Care Overview (Adult)  Goal: Plan of Care Review  Outcome: Ongoing (interventions implemented as appropriate)    10/17/17 1629   Coping/Psychosocial Response Interventions   Plan Of Care Reviewed With patient   Patient Care Overview   Progress progress toward functional goals as expected   Outcome Evaluation   Outcome Summary/Follow up Plan OT re-evaluation completed d/t time. pt was more alert this date. Pt was oriented to person. pt's speech was difficult to understand but pt was able to carry on a conversation with OT and nsg. Pt was max/dep for toileting in supine. pt was mod x2 to roll to L side. Pt was max x2 to roll to R side. Pt was dependent to scoot to HOB. Skilled OT recommended to cont to address adls, bed mobility, strength, education, coordination and ROM. Recommended d/c SNF.         Problem: Inpatient Occupational Therapy  Goal: Bed Mobility Goal LTG- OT  Outcome: Ongoing (interventions implemented as appropriate)    10/05/17 1615 10/17/17 1629   Bed Mobility OT LTG   Bed Mobility OT LTG, Date Established 10/05/17 --    Bed Mobility OT LTG, Time to Achieve by discharge --    Bed Mobility OT LTG, Activity Type all bed mobility --    Bed Mobility OT LTG, [place] Level maximum assist (25% patient effort) --    Bed Mobility OT LTG, Assist Device bed rails --    Bed Mobility OT LTG, Date Goal Reviewed --  10/17/17   Bed Mobility OT LTG, Outcome --  goal ongoing   Bed Mobility OT LTG, Reason Goal Not Met --  progress slower than expected       Goal: Range of Motion Goal LTG- OT  Outcome: Ongoing (interventions implemented as appropriate)    10/05/17 1615 10/17/17 1629   Range of Motion OT LTG   Range of Motion Goal OT LTG, Date Established 10/05/17 --    Range of Motion Goal OT LTG, Time to Achieve by discharge --    Range of Motion Goal OT LTG, AROM Measure Pt will progress with ROM to L UE/LE to decrease tone and prevent contracture. --    Range of Motion Goal OT LTG, Date Goal  Reviewed --  10/17/17   Range of Motion Goal OT LTG, Outcome --  goal ongoing   Reason Goal Not Met (ROM) OT LTG --  progress slower than expected       Goal: Eating Self-Feeding Goal LTG- OT  Outcome: Revised    10/05/17 1615 10/17/17 1629   Eating Self-Feeding OT LTG   Eat Self Feeding Goal OT LTG, Date Established 10/05/17 --    Eat Self Feeding Goal OT LTG, Time to Achieve by discharge --    Eat Self Feed Goal OT LTG, [place] Level supervision required;set up required --    Eat Self Feeding Goal OT LTG, Adaptive Equip utensils, large  --    Eat Self Feeding Goal OT LTG, Position reclined, sitting in bed --    Eat Self Feeding Goal OT LTG, Date Goal Review --  10/17/17   Eat Self Feeding Goal OT LTG, Outcome --  goal no longer appropriate   Eat Self Feeding Goal OT LTG, Reason Not Met --  other (see comments)  (Pt currently has PEG tube)       Goal: Grooming Goal LTG- OT  Outcome: Ongoing (interventions implemented as appropriate)    10/05/17 1615 10/17/17 1629   Grooming OT LTG   Grooming Goal OT LTG, Date Established 10/05/17 --    Grooming Goal OT LTG, Time to Achieve by discharge --    Grooming Goal OT LTG, [place] Level moderate assist (50% patient effort) --    Grooming Goal OT LTG, Position reclined, sitting in bed --    Grooming Goal OT LTG, Date Goal Reviewed --  10/17/17   Grooming Goal OT LTG, Outcome --  goal ongoing   Grooming Goal OT LTG, Reason Goal Not Met --  progress slower than expected       Goal: Coordination Goal LTG- OT  Outcome: Ongoing (interventions implemented as appropriate)    10/17/17 1629   Coordination OT LTG   Coordination OT LTG, Date Established 10/17/17   Coordination OT LTG, Time to Achieve by discharge   Coordination OT LTG, Activity Type GM written ex program;FM task;GM task;FM written ex program   Coordination OT LTG, [place] Level min assist;with verbal cues

## 2017-10-17 NOTE — PLAN OF CARE
Problem: Patient Care Overview (Adult)  Goal: Plan of Care Review  Outcome: Ongoing (interventions implemented as appropriate)    10/17/17 0421   Coping/Psychosocial Response Interventions   Plan Of Care Reviewed With patient   Patient Care Overview   Progress no change   Outcome Evaluation   Outcome Summary/Follow up Plan vss; alert and oriented to self; garbled speech; no c/o pain; iv abx continue; afebrile; incontinent of bladder and bowel; left side weakness; safety maintained       Goal: Adult Individualization and Mutuality  Outcome: Ongoing (interventions implemented as appropriate)  Goal: Discharge Needs Assessment  Outcome: Ongoing (interventions implemented as appropriate)    Problem: Stroke (Ischemic) (Adult)  Goal: Signs and Symptoms of Listed Potential Problems Will be Absent or Manageable (Stroke)  Outcome: Ongoing (interventions implemented as appropriate)    Problem: Fall Risk (Adult)  Goal: Absence of Falls  Outcome: Ongoing (interventions implemented as appropriate)    Problem: Wound, Traumatic, Nonburn (Adult)  Goal: Signs and Symptoms of Listed Potential Problems Will be Absent or Manageable (Wound, Traumatic, Nonburn)  Outcome: Ongoing (interventions implemented as appropriate)    Problem: Pressure Ulcer Risk (Chuy Scale) (Adult,Obstetrics,Pediatric)  Goal: Skin Integrity  Outcome: Ongoing (interventions implemented as appropriate)    Problem: Nutrition, Enteral (Adult)  Goal: Signs and Symptoms of Listed Potential Problems Will be Absent or Manageable (Nutrition, Enteral)  Outcome: Ongoing (interventions implemented as appropriate)

## 2017-10-18 PROCEDURE — 63710000001 PREDNISONE PER 5 MG: Performed by: INTERNAL MEDICINE

## 2017-10-18 PROCEDURE — 94799 UNLISTED PULMONARY SVC/PX: CPT

## 2017-10-18 PROCEDURE — 99232 SBSQ HOSP IP/OBS MODERATE 35: CPT | Performed by: PSYCHIATRY & NEUROLOGY

## 2017-10-18 PROCEDURE — 25010000002 ENOXAPARIN PER 10 MG: Performed by: FAMILY MEDICINE

## 2017-10-18 PROCEDURE — 92507 TX SP LANG VOICE COMM INDIV: CPT

## 2017-10-18 RX ORDER — BACLOFEN 10 MG/1
10 TABLET ORAL 2 TIMES DAILY
Status: DISCONTINUED | OUTPATIENT
Start: 2017-10-18 | End: 2017-10-19 | Stop reason: HOSPADM

## 2017-10-18 RX ORDER — BACLOFEN 10 MG/1
10 TABLET ORAL ONCE
Status: COMPLETED | OUTPATIENT
Start: 2017-10-18 | End: 2017-10-18

## 2017-10-18 RX ADMIN — METOPROLOL TARTRATE 12.5 MG: 25 TABLET, FILM COATED ORAL at 20:27

## 2017-10-18 RX ADMIN — DANTROLENE SODIUM 25 MG: 25 CAPSULE ORAL at 20:27

## 2017-10-18 RX ADMIN — DANTROLENE SODIUM 25 MG: 25 CAPSULE ORAL at 16:58

## 2017-10-18 RX ADMIN — Medication 250 MG: at 09:11

## 2017-10-18 RX ADMIN — NYSTATIN: 100000 POWDER TOPICAL at 20:27

## 2017-10-18 RX ADMIN — FUROSEMIDE 20 MG: 20 TABLET ORAL at 09:10

## 2017-10-18 RX ADMIN — LEVOFLOXACIN 750 MG: 750 TABLET, FILM COATED ORAL at 20:27

## 2017-10-18 RX ADMIN — ENOXAPARIN SODIUM 40 MG: 40 INJECTION SUBCUTANEOUS at 09:11

## 2017-10-18 RX ADMIN — POLYVINYL ALCOHOL 2 DROP: 14 SOLUTION/ DROPS OPHTHALMIC at 20:27

## 2017-10-18 RX ADMIN — ACETAMINOPHEN 650 MG: 650 SUPPOSITORY RECTAL at 20:27

## 2017-10-18 RX ADMIN — IPRATROPIUM BROMIDE AND ALBUTEROL SULFATE 3 ML: 2.5; .5 SOLUTION RESPIRATORY (INHALATION) at 10:55

## 2017-10-18 RX ADMIN — IPRATROPIUM BROMIDE AND ALBUTEROL SULFATE 3 ML: 2.5; .5 SOLUTION RESPIRATORY (INHALATION) at 20:31

## 2017-10-18 RX ADMIN — Medication 250 MG: at 17:00

## 2017-10-18 RX ADMIN — BACLOFEN 10 MG: 10 TABLET ORAL at 17:00

## 2017-10-18 RX ADMIN — OXYBUTYNIN CHLORIDE 5 MG: 5 TABLET, FILM COATED, EXTENDED RELEASE ORAL at 09:11

## 2017-10-18 RX ADMIN — PREDNISONE 3 MG: 1 TABLET ORAL at 09:11

## 2017-10-18 RX ADMIN — METOPROLOL TARTRATE 12.5 MG: 25 TABLET, FILM COATED ORAL at 09:10

## 2017-10-18 RX ADMIN — FLUOXETINE 10 MG: 10 CAPSULE ORAL at 09:11

## 2017-10-18 RX ADMIN — NYSTATIN: 100000 POWDER TOPICAL at 09:11

## 2017-10-18 RX ADMIN — IPRATROPIUM BROMIDE AND ALBUTEROL SULFATE 3 ML: 2.5; .5 SOLUTION RESPIRATORY (INHALATION) at 15:27

## 2017-10-18 RX ADMIN — Medication 81 MG: at 09:11

## 2017-10-18 RX ADMIN — GABAPENTIN 300 MG: 300 CAPSULE ORAL at 09:10

## 2017-10-18 RX ADMIN — DANTROLENE SODIUM 25 MG: 25 CAPSULE ORAL at 09:11

## 2017-10-18 RX ADMIN — BACLOFEN 10 MG: 10 TABLET ORAL at 02:21

## 2017-10-18 RX ADMIN — IPRATROPIUM BROMIDE AND ALBUTEROL SULFATE 3 ML: 2.5; .5 SOLUTION RESPIRATORY (INHALATION) at 06:58

## 2017-10-18 RX ADMIN — BACLOFEN 10 MG: 10 TABLET ORAL at 09:11

## 2017-10-18 RX ADMIN — GABAPENTIN 300 MG: 300 CAPSULE ORAL at 20:27

## 2017-10-18 RX ADMIN — ACETYLCYSTEINE 1.5 ML: 200 SOLUTION ORAL; RESPIRATORY (INHALATION) at 06:58

## 2017-10-18 NOTE — PROGRESS NOTES
Continued Stay Note  [facility]     Patient Name: Pallavi Yee  MRN: [id number]  Today's Date: 10/18/2017    Admit Date: 10/4/2017          Discharge Plan       10/18/17 0841    Case Management/Social Work Plan    Plan [facility] - Tomorrow 10/19    Additional Comments Plan discharge to [facility] tomorro, 10/19. JIHAN called [facility] and arranged transport for 8AM tomorrow. JIHAN called [facility] and notified Jorge in admissions of plans for discharge tomorrow. JIHAN will follow to arrange discharge to [facility].            HUI Hayes

## 2017-10-18 NOTE — THERAPY TREATMENT NOTE
"Acute Care - Speech Language Pathology Treatment Note  [facility]     Patient Name: Pallavi Yee  : 1980  MRN: [id number]  Today's Date: 10/18/2017  Referring Physician: Dr. Guillen      Admit Date: 10/4/2017  Patient was alert, but particpation in therapy was poor. Patient was able to follow one step directions 5/10 times with increased time, repeition, and maximum cues. Patient was able to identify and point to objects such as : pen, tv, fan, watch, and cup. The patient used miminal verbalizations throughout the session. Patient was able to correctly answer simple yes/no questions by shaking/nodding her head . Patient was able to verbalize place, name, and year. Patient  shook her head \"no\" when asked the month, day, and city. Patient did look at SLP throughout the session with maximum cueing. Toward the end of the session, the patient started to refuse therapy and stated she was tired and was done.    Elvia Chung, Speech Therapy Student 10/18/2017 2:04 PM  Visit Dx:      ICD-10-CM ICD-9-CM   1. Oropharyngeal dysphagia R13.12 787.22   2. Mobility impaired Z74.09 799.89   3. Impaired mobility and ADLs Z74.09 799.89   4. Cognitive impairment R41.89 294.9     Patient Active Problem List   Diagnosis   • CVA (cerebral vascular accident)   • Oropharyngeal dysphagia              Adult Rehabilitation Note       10/18/17 1003 10/17/17 1045       Rehab Assessment/Intervention    Discipline (P)  speech language pathologist  -ML speech language pathologist  -TM     Document Type (P)  therapy note (daily note)  -ML therapy note (daily note)  -     Subjective Information (P)  complains of;fatigue  -ML unable to respond  -TM     Patient Effort, Rehab Treatment (P)  poor  -ML poor  -TM     Precautions/Limitations, Vision (P)  WFL with corrective lenses  -ML      Precautions/Limitations, Hearing (P)  WFL  -ML      Recorded by [ML] Elvia Chung, Speech Therapy Student [TM] Fatuma Leger, CCC-SLP     Improve ability to " comprehend words/phrases/sentences    Status: Improve ability to comprehend words/phrases/sentences (P)  Progress slower than expected  -ML      Ability to Comprehend Words/Phrases/Sentences Progress (P)  70%;with consistent cues  -ML      Recorded by [ML] Elvia Chung, Speech Therapy Student      Improve ability to follow directions    Ability to Follow Directions Progress (P)  50%;with consistent cues  -ML      Recorded by [ML] Elvia Chung, Speech Therapy Student      Improve ability to comprehend questions    Ability to Comprehend Questions Progress (P)  50%;with consistent cues  -ML      Recorded by [ML] Elvia Chung, Speech Therapy Student      Improve attention    Attention Progress (P)  40%;with consistent cues  -ML      Recorded by [ML] Elvia Chung, Speech Therapy Student      Improve orientation    Orientation Progress (P)  30%;with consistent cues  -ML      Recorded by [ML] Elvia Chung, Speech Therapy Student      Improve oral skills    Oral Skills Progress  0%;with inconsistent cues  -TM     Comments: Improve Oral Skills  Pt was unable to protrude tongue despite max verbal cues.   -TM     Recorded by  [TM] Fatuma Leger CCC-SLP     Improve timing of pharyngeal response    Timing of Pharyngeal Response Progress  20%;with consistent cues  -TM     Comments: Improve timing of pharyngeal response  Pt was presented multiple trials of labial ice chip stimulation with verbal cues to complete swallow. Pt was able to complete a swallow x2, one of which was completed within two seconds of cue.   -TM     Recorded by  [TM] YVETTE Traore     Improve laryngeal closure    Laryngeal Closure Progress  10%;with consistent cues;following model  -TM     Comments: Improve laryngeal closure  Pt was provided max verbal cues and model to complete laryngeal adduction tasks through /ah/ vocalizations. Pt was only able to vocalize x3, with weak productions noted.  -TM     Recorded by  [TM] Fatuma Leger CCC-SLP        User Key  (r) = Recorded By, (t) = Taken By, (c) = Cosigned By    Initials Name Effective Dates    TM Fatuma Leger, CCC-SLP 08/02/16 -     ML Elvia Chung, Speech Therapy Student 08/08/17 -               IP SLP Goals       10/18/17 1342 10/17/17 1105 10/16/17 0934    Begin to Take Some PO Safely    Begin to Take Some PO Safely- SLP, Date Goal Reviewed  10/17/17  -TM     Receptive Language- Optimal Participation in Care    Receptive Language Optimal Participation in Care- SLP, Date Established   10/16/17  -EA    Receptive Language Optimal Participation in Care- SLP, Time to Achieve   by discharge  -EA    Receptive Language Optimal Participation in Care- SLP, Activity Level   Patient will improve ability to comprehend words/phrases/sentences;Patient will improve ability to follow directions;Patient will improve ability to comprehend questions  -EA    Receptive Language Optimal Participation in Care- SLP, Date Goal Reviewed (P)  10/18/17  -ML  10/16/17  -EA    Receptive Language Optimal Participation in Care- SLP, Outcome   goal ongoing  -EA    Expressive- Optimal Participation in Care    Expressive Optimal Participation in Care- SLP, Date Established   10/16/17  -EA    Expressive Optimal Participation in Care- SLP, Time to Achieve   by discharge  -EA    Expressive Optimal Participation in Care- SLP, Activity Level   Patient will improve ability to construct phrase and sentence level responses;Patient will improve connected speech to express thoughts  -EA    Expressive Optimal Participation in Care- SLP, Date Goal Reviewed   10/16/17  -EA    Expressive Optimal Participation in Care- SLP, Outcome   goal ongoing  -EA    Cognitive Linguistic- Optimal Participation in Care    Cognitive Linguistic Optimal Participation in Care- SLP, Date Established   10/16/17  -EA    Cognitive Linguistic Optimal Participation in Care- SLP, Time to Achieve   by discharge  -EA    Cognitive Linguistic Optimal Participation in Care- SLP,  Activity Level   Patient will improve attention skills for increased safety in environment;Patient will improve orientation for increased safety in environment;Patient will improve Executive functioning skills for increased safety in environment  -EA    Cognitive Linguistic Optimal Participation in Care- SLP, Date Goal Reviewed (P)  10/18/17  -ML  10/16/17  -EA    Cognitive Linguistic Optimal Participation in Care- SLP, Outcome   goal ongoing  -EA    Cognitive Linguistic Optimal Participation in Care- SLP, Reason Goal Not Met (P)  progress slower than expected  -ML        10/13/17 1218 10/11/17 1253 10/06/17 1135    Begin to Take Some PO Safely    Begin to Take Some PO Safely- SLP, Date Goal Reviewed 10/13/17  -TM 10/11/17  -KW 10/06/17  -TM      10/05/17 0910          Begin to Take Some PO Safely    Begin to Take Some PO Safely- SLP, Date Established 10/05/17  -BN      Begin to Take Some PO Safely- SLP, Time to Achieve by discharge  -BN      Begin to Take Some PO Safely- SLP, Activity Level Patient will improve oral skills for more efficient eating;Patient will improve timing of pharyngeal response for safer, more efficient swallow  -BN      Begin to Take Some PO Safely- SLP, Additional Goal Pt will tolerate PO trials with SLP without s/s of aspiration  -BN      Begin to Take Some PO Safely- SLP, Date Goal Reviewed 10/05/17  -BN      Begin to Take Some PO Safely- SLP, Outcome goal ongoing  -BN        User Key  (r) = Recorded By, (t) = Taken By, (c) = Cosigned By    Initials Name Provider Type    TM Fatuma Leger, CCC-SLP Speech and Language Pathologist    APOLINAR eLong, MS CCC-SLP Speech and Language Pathologist    JOHN Aguirre, CCC-SLP Speech and Language Pathologist    CRESCENCIO Hester, MS, CFY-SLP Speech and Language Pathologist    ML Elvia Chung, Speech Therapy Student Speech Therapy Student          EDUCATION  The patient has been educated in the following areas:   Cognitive  "Impairment.    SLP Recommendation and Plan                               Plan of Care Review  Plan Of Care Reviewed With: (P) patient  Progress: (P) no change  Outcome Summary/Follow up Plan: (P) Patient was alert, but particpation in therapy was poor. Patient was able to follow one step directions 5/10 times with increased time, repeition, and maximum cues. The patient used miminal  verbalizations throughout the session. Patient was able to correctly answer simple yes/no questions by shaking/nodding her head . Patient was able to verbalize place, name, and year. Patient  shook her head \"no\" when asked the month, day, and city. Patient did look at SLP throughout the session with maximum cueing. Toward the end of the session, the patient started to refuse therapy and stated she was tired and was done.           SLP Outcome Measures (last 72 hours)      SLP Outcome Measures       10/16/17 0900          SLP Outcome Measures    Outcome Measure Used? Adult NOMS  -EA      FCM Scores    Spoken Language Expression FCM Score 1  -EA        User Key  (r) = Recorded By, (t) = Taken By, (c) = Cosigned By    Initials Name Effective Dates    EA Adelaida DECLAN Hester MS, CFY-SLP 02/21/17 -           Time Calculation:         Time Calculation- SLP       10/18/17 1358          Time Calculation- SLP    SLP Start Time (P)  1003  -ML      SLP Stop Time (P)  1018  -ML      SLP Time Calculation (min) (P)  15 min  -ML      SLP Received On (P)  10/18/17  -ML        User Key  (r) = Recorded By, (t) = Taken By, (c) = Cosigned By    Initials Name Provider Type     Elvia Chung Speech Therapy Student Speech Therapy Student          Therapy Charges for Today     Code Description Service Date Service Provider Modifiers Qty    35282278071  ST TREATMENT SPEECH 1 10/18/2017 Elvia Chung Speech Therapy Student SHAWN CUADRA 1    [id number]  ST TREATMENT SPEECH 1 10/18/2017 Elvia Chung Speech Therapy Student SHAWN CUADRA 1          SLP G-Codes  SLP NOMS Used?: " Yes  Functional Limitations: Spoken language expressive  Swallow Current Status (): At least 80 percent but less than 100 percent impaired, limited or restricted  Swallow Goal Status (): At least 60 percent but less than 80 percent impaired, limited or restricted  Spoken Language Expression Current Status (): At least 80 percent but less than 100 percent impaired, limited or restricted  Spoken Language Expression Goal Status (): At least 80 percent but less than 100 percent impaired, limited or restricted  Spoken Language Expression Discharge Status (): At least 80 percent but less than 100 percent impaired, limited or restricted    Elvia Chung, Speech Therapy Student  10/18/2017
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
## 2017-10-18 NOTE — PLAN OF CARE
Problem: Patient Care Overview (Adult)  Goal: Plan of Care Review  Outcome: Ongoing (interventions implemented as appropriate)    10/18/17 0449   Coping/Psychosocial Response Interventions   Plan Of Care Reviewed With patient   Patient Care Overview   Progress no change   Outcome Evaluation   Outcome Summary/Follow up Plan vss; alert but speech was garbled, hard to understand at times; c/o left leg pain; tube feeding; turns every 2 hours; safety maintained       Goal: Adult Individualization and Mutuality  Outcome: Ongoing (interventions implemented as appropriate)  Goal: Discharge Needs Assessment  Outcome: Ongoing (interventions implemented as appropriate)    Problem: Stroke (Ischemic) (Adult)  Goal: Signs and Symptoms of Listed Potential Problems Will be Absent or Manageable (Stroke)  Outcome: Ongoing (interventions implemented as appropriate)    Problem: Fall Risk (Adult)  Goal: Absence of Falls  Outcome: Ongoing (interventions implemented as appropriate)    Problem: Wound, Traumatic, Nonburn (Adult)  Goal: Signs and Symptoms of Listed Potential Problems Will be Absent or Manageable (Wound, Traumatic, Nonburn)  Outcome: Ongoing (interventions implemented as appropriate)    Problem: Pressure Ulcer Risk (Chuy Scale) (Adult,Obstetrics,Pediatric)  Goal: Skin Integrity  Outcome: Ongoing (interventions implemented as appropriate)    Problem: Nutrition, Enteral (Adult)  Goal: Signs and Symptoms of Listed Potential Problems Will be Absent or Manageable (Nutrition, Enteral)  Outcome: Ongoing (interventions implemented as appropriate)

## 2017-10-18 NOTE — PLAN OF CARE
Problem: Patient Care Overview (Adult)  Goal: Plan of Care Review  Outcome: Ongoing (interventions implemented as appropriate)    10/18/17 1721   Coping/Psychosocial Response Interventions   Plan Of Care Reviewed With patient   Patient Care Overview   Progress no change   Outcome Evaluation   Outcome Summary/Follow up Plan Patient maintained safety, complains of leg cramps at times mainly left leg, reposition leg usually helps for a little bit, patient continues on jevity 1.2 tolerating well, vss, will continue to monitor.          Problem: Stroke (Ischemic) (Adult)  Goal: Signs and Symptoms of Listed Potential Problems Will be Absent or Manageable (Stroke)  Outcome: Ongoing (interventions implemented as appropriate)    Problem: Fall Risk (Adult)  Goal: Absence of Falls  Outcome: Ongoing (interventions implemented as appropriate)    Problem: Wound, Traumatic, Nonburn (Adult)  Goal: Signs and Symptoms of Listed Potential Problems Will be Absent or Manageable (Wound, Traumatic, Nonburn)  Outcome: Ongoing (interventions implemented as appropriate)    Problem: Pressure Ulcer Risk (Chuy Scale) (Adult,Obstetrics,Pediatric)  Goal: Skin Integrity  Outcome: Ongoing (interventions implemented as appropriate)    Problem: Nutrition, Enteral (Adult)  Goal: Signs and Symptoms of Listed Potential Problems Will be Absent or Manageable (Nutrition, Enteral)  Outcome: Ongoing (interventions implemented as appropriate)

## 2017-10-18 NOTE — PROGRESS NOTES
Neurology Progress Note      Date of admission: 10/4/2017  5:24 PM  Date of visit: 10/18/2017    Chief Complaint:  Strokes    Subjective     Subjective:  More awake and alert this morning.  Full conversations. Mild coughing.  Medications:  Current Facility-Administered Medications   Medication Dose Route Frequency Provider Last Rate Last Dose   • acetaminophen (TYLENOL) suppository 650 mg  650 mg Rectal Q4H PRN Latrice Guillen MD   650 mg at 10/17/17 2334   • acetylcysteine (MUCOMYST) 20 % nebulizer solution 1.5 mL  1.5 mL Nebulization BID - RT Steve Friend MD   1.5 mL at 10/18/17 0658   • aspirin chewable tablet 81 mg  81 mg Oral Daily Albert Kraft MD   81 mg at 10/18/17 0911    Or   • aspirin suppository 300 mg  300 mg Rectal Daily Albert Kraft MD   300 mg at 10/10/17 1220   • baclofen (LIORESAL) tablet 10 mg  10 mg Oral BID Humberto Russell MD   10 mg at 10/18/17 0911   • bisacodyl (DULCOLAX) suppository 10 mg  10 mg Rectal Daily PRN Humberto Russell MD   10 mg at 10/16/17 0623   • dantrolene (DANTRIUM) capsule 25 mg  25 mg Oral TID Dylan Johnson DO   25 mg at 10/18/17 0911   • enoxaparin (LOVENOX) syringe 40 mg  40 mg Subcutaneous Daily Latrice Guillen MD   40 mg at 10/18/17 0911   • FLUoxetine (PROzac) capsule 10 mg  10 mg Oral Daily Dylan Johnson DO   10 mg at 10/18/17 0911   • furosemide (LASIX) tablet 20 mg  20 mg Per G Tube Daily Dylan Johnson DO   20 mg at 10/18/17 0910   • gabapentin (NEURONTIN) capsule 300 mg  300 mg Oral Q12H Dami Keane MD   300 mg at 10/18/17 0910   • iopamidol (ISOVUE-370) 76 % injection 150 mL  150 mL Intravenous Once in imaging Humberto Russell MD       • ipratropium-albuterol (DUO-NEB) nebulizer solution 3 mL  3 mL Nebulization 4x Daily - RT Dami Keane MD   3 mL at 10/18/17 0658   • labetalol (NORMODYNE,TRANDATE) injection 10 mg  10 mg Intravenous Q10 Min PRN Albert Kraft MD       • levoFLOXacin (LEVAQUIN) tablet  750 mg  750 mg Per G Tube Q24H Dami Keane MD   750 mg at 10/17/17 1956   • metoprolol tartrate (LOPRESSOR) tablet 12.5 mg  12.5 mg Oral Q12H Latrice Guillen MD   12.5 mg at 10/18/17 0910   • nystatin (MYCOSTATIN) powder   Topical Q12H Dylan Johnson DO       • ondansetron (ZOFRAN) injection 4 mg  4 mg Intravenous Q6H PRN Albert Kraft MD       • oxybutynin XL (DITROPAN-XL) 24 hr tablet 5 mg  5 mg Oral Daily Dylan Johnson DO   5 mg at 10/18/17 0911   • polyvinyl alcohol (LIQUIFILM) 1.4 % ophthalmic solution 2 drop  2 drop Both Eyes Nightly Dylan Johnson DO   2 drop at 10/17/17 2107   • predniSONE (DELTASONE) tablet 3 mg  3 mg Oral Daily Dylan Johnson DO   3 mg at 10/18/17 0911   • saccharomyces boulardii (FLORASTOR) capsule 250 mg  250 mg Per G Tube BID Dami Keane MD   250 mg at 10/18/17 0911   • senna (SENOKOT) tablet 17.2 mg  2 tablet Oral Daily PRN Dylan Johnson DO   17.2 mg at 10/15/17 1811   • sodium chloride 0.9 % flush 1-10 mL  1-10 mL Intravenous PRN Latrice Guillen MD       • sodium chloride 0.9 % flush 1-10 mL  1-10 mL Intravenous PRN Albert Kraft MD           Review of Systems:   -A 14 point review of systems is completed and is difficult to obtain    Objective     Objective      Vital Signs  Temp:  [97.9 °F (36.6 °C)-98.8 °F (37.1 °C)] 97.9 °F (36.6 °C)  Heart Rate:  [66-93] 85  Resp:  [16-18] 18  BP: (135-151)/(77-92) 151/85    Physical Exam:    HEENT:  Neck supple  CVS:  Regular rate and rhythm.  No murmurs  Carotid Examination:  No bruits  Lungs:  Clear to auscultation  Abdomen:  Non-tender, Non-distended  Extremities:  No signs of peripheral edema    Neurologic Exam:    -Awakens but drowsy, Very dysarthric.  Says yes and no appropriately  -Not following commands    Cranial nerves II through XII about the same.    Motor: Moving right upper and right lower extremity well.  Not moving her left upper extremity.  --contractures of left upper  extremity    Results Review:    I reviewed the patient's new clinical results.    Lab Results (last 24 hours)     ** No results found for the last 24 hours. **        Imaging Results (last 24 hours)     ** No results found for the last 24 hours. **        Echocardiogram:   · Left ventricular wall thickness is consistent with mild concentric hypertrophy.  · Left ventricular systolic function is normal. Estimated EF appears to be in the range of 61 - 65%.  · No evidence of a patent foramen ovale.  · No significant valvular abnormalities are identified.    CTA of neck reviewed showing no significant stenosis  Assessment/Plan     Hospital Problem List    Principal Problem:    Oropharyngeal dysphagia  Active Problems:    CVA (cerebral vascular accident)    Impression:  1. Multiple acute ischemic strokes..  2. Left internal carotid artery stenosis at 50 to 69%.  3.  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  4.  Aspiration pneumonia  5.  Dysarthria  6.  Dysphagia requiring PEG placement    Plan:  Continue aspirin   Lipitor at discharge  Smoking cessation counseling when she is able to comprehend.   Continue PT/OT  PEG in place and working  Discharge planning ongoing.  Patient is clinically improving.    Albert Kraft MD  10/18/17  10:38 AM

## 2017-10-18 NOTE — PROGRESS NOTES
[facility] Medicine Services  INPATIENT PROGRESS NOTE    Length of Stay: 14  Date of Admission: 10/4/2017  Primary Care Physician: ROBERTH Beckham    Subjective   Chief Complaint: Follow-up stroke; aspiration PNA  HPI   Patient resting comfortably this afternoon.  No acute events reported from overnight.  She reports that she's tired; voices no other new complaints    Review of Systems     All pertinent negatives and positives are as above. All other systems have been reviewed and are negative unless otherwise stated.     Objective    Temp:  [97.5 °F (36.4 °C)-98.8 °F (37.1 °C)] 97.5 °F (36.4 °C)  Heart Rate:  [68-93] 82  Resp:  [16-18] 18  BP: (126-151)/(73-92) 126/73  Physical Exam   Constitutional: No distress.   Alert   HENT:   Head: Normocephalic.   Mouth/Throat: No oropharyngeal exudate (some oral secretions noted).   Eyes: Pupils are equal, round, and reactive to light. No scleral icterus.   Neck: No tracheal deviation present.   Cardiovascular: Normal rate and regular rhythm.    Pulmonary/Chest: Effort normal. No respiratory distress.   Dminished BSs at left base but overall improved compared to previous exams   Abdominal: Soft.   PEG in place   Neurological: She is alert.   Skin: Skin is warm and dry. She is not diaphoretic.   Vitals reviewed.      Results Review:  I have reviewed the labs, radiology results, and diagnostic studies.    Laboratory Data:     Results from last 7 days  Lab Units 10/14/17  0708 10/12/17  0903   WBC 10*3/mm3 8.73 7.18   HEMOGLOBIN g/dL 12.0 11.4*   HEMATOCRIT % 37.1 35.4*   PLATELETS 10*3/mm3 193 198          Results from last 7 days  Lab Units 10/14/17  0708 10/12/17  0903   SODIUM mmol/L 138 140   POTASSIUM mmol/L 4.0 3.5   CHLORIDE mmol/L 99 105   CO2 mmol/L 28.0 26.0   BUN mg/dL 12 7   CREATININE mg/dL 0.58 0.43*   CALCIUM mg/dL 9.6 9.1   BILIRUBIN mg/dL 0.2  --    ALK PHOS U/L 103  --    ALT (SGPT) U/L 31  --    AST (SGOT) U/L 30  --     GLUCOSE mg/dL 118* 108*       Culture Data:        Radiology Data:   Imaging Results (last 24 hours)     ** No results found for the last 24 hours. **          I have reviewed the patient current medications.     Assessment/Plan     Hospital Problem List     * (Principal)Oropharyngeal dysphagia    Overview Signed 10/10/2017  7:34 AM by Josefina Barry MD     Added automatically from request for surgery [id number]         CVA (cerebral vascular accident)        Assessment:  1.  Multiple strokes in the left subcortical region  2.  History of traumatic brain injury  3.  Dysphagia requiring PEG placement  4.  Dysarthria  5.  Aspiration pneumonia - worsening left sided infiltrates/consolidation; sputum culture positive for Stenotrophomonas.  6.  Right sided rib fractures    Plan:  1.  Now on monotherapy with Levaquin (via G-tube)  2.  Will hold TFs two hours before and two hours after Levaquin administration  3.  Updated patient's mother via teleconference yesterdau  4.  Repeat CXR appears to be improving  5.  Pulmonary signed off  6.  TFs with Jevity (currently at goal and tolerating with minimal residuals)  7.  Probiotic  8.  ASA therapy  9.  PT/OT/ST  10.  Dispo: [facility] tomorrow    Dami Keane MD   10/18/17   12:16 PM

## 2017-10-18 NOTE — PLAN OF CARE
Problem: Patient Care Overview (Adult)  Goal: Plan of Care Review  Outcome: Ongoing (interventions implemented as appropriate)    10/18/17 1342   Coping/Psychosocial Response Interventions   Plan Of Care Reviewed With patient   Patient Care Overview   Progress no change         Problem: Inpatient SLP  Goal: Cognitive-linguistic-Patient will improve Cognitive-linguistic skills to allow optimal participation in care  Outcome: Ongoing (interventions implemented as appropriate)    10/16/17 0934 10/18/17 1342   Cognitive Linguistic- Optimal Participation in Care   Cognitive Linguistic Optimal Participation in Care- SLP, Date Established 10/16/17 --    Cognitive Linguistic Optimal Participation in Care- SLP, Time to Achieve by discharge --    Cognitive Linguistic Optimal Participation in Care- SLP, Activity Level Patient will improve attention skills for increased safety in environment;Patient will improve orientation for increased safety in environment;Patient will improve Executive functioning skills for increased safety in environment --    Cognitive Linguistic Optimal Participation in Care- SLP, Date Goal Reviewed --  10/18/17   Cognitive Linguistic Optimal Participation in Care- SLP, Outcome goal ongoing --    Cognitive Linguistic Optimal Participation in Care- SLP, Reason Goal Not Met --  progress slower than expected       Goal: Receptive Language-Patient will improve receptive language skills to allow optimal participation in care  Outcome: Ongoing (interventions implemented as appropriate)    10/16/17 0934 10/18/17 1342   Receptive Language- Optimal Participation in Care   Receptive Language Optimal Participation in Care- SLP, Date Established 10/16/17 --    Receptive Language Optimal Participation in Care- SLP, Time to Achieve by discharge --    Receptive Language Optimal Participation in Care- SLP, Activity Level Patient will improve ability to comprehend words/phrases/sentences;Patient will improve ability to  follow directions;Patient will improve ability to comprehend questions --    Receptive Language Optimal Participation in Care- SLP, Date Goal Reviewed --  10/18/17   Receptive Language Optimal Participation in Care- SLP, Outcome goal ongoing --

## 2017-10-19 VITALS
DIASTOLIC BLOOD PRESSURE: 55 MMHG | HEART RATE: 95 BPM | OXYGEN SATURATION: 96 % | SYSTOLIC BLOOD PRESSURE: 125 MMHG | HEIGHT: 67 IN | BODY MASS INDEX: 25.54 KG/M2 | WEIGHT: 162.7 LBS | RESPIRATION RATE: 20 BRPM | TEMPERATURE: 98.2 F

## 2017-10-19 PROBLEM — J69.0 ASPIRATION PNEUMONIA (HCC): Status: ACTIVE | Noted: 2017-10-19

## 2017-10-19 PROBLEM — S06.9XAA TRAUMATIC BRAIN INJURY (HCC): Status: ACTIVE | Noted: 2017-10-19

## 2017-10-19 PROCEDURE — 94799 UNLISTED PULMONARY SVC/PX: CPT

## 2017-10-19 PROCEDURE — 25010000002 ENOXAPARIN PER 10 MG: Performed by: FAMILY MEDICINE

## 2017-10-19 PROCEDURE — 63710000001 PREDNISONE PER 5 MG: Performed by: INTERNAL MEDICINE

## 2017-10-19 RX ORDER — ASPIRIN 81 MG/1
81 TABLET, CHEWABLE ORAL DAILY
Start: 2017-10-19

## 2017-10-19 RX ORDER — GABAPENTIN 300 MG/1
300 CAPSULE ORAL EVERY 12 HOURS SCHEDULED
Start: 2017-10-19

## 2017-10-19 RX ORDER — LEVOFLOXACIN 750 MG/1
750 TABLET ORAL EVERY 24 HOURS
Start: 2017-10-19

## 2017-10-19 RX ORDER — BACLOFEN 10 MG/1
10 TABLET ORAL 2 TIMES DAILY
Start: 2017-10-19

## 2017-10-19 RX ORDER — IPRATROPIUM BROMIDE AND ALBUTEROL SULFATE 2.5; .5 MG/3ML; MG/3ML
3 SOLUTION RESPIRATORY (INHALATION) 4 TIMES DAILY PRN
Qty: 360 ML
Start: 2017-10-19

## 2017-10-19 RX ORDER — BISACODYL 10 MG
10 SUPPOSITORY, RECTAL RECTAL DAILY PRN
Start: 2017-10-19

## 2017-10-19 RX ORDER — SACCHAROMYCES BOULARDII 250 MG
250 CAPSULE ORAL 2 TIMES DAILY
Start: 2017-10-19

## 2017-10-19 RX ADMIN — OXYBUTYNIN CHLORIDE 5 MG: 5 TABLET, FILM COATED, EXTENDED RELEASE ORAL at 08:16

## 2017-10-19 RX ADMIN — Medication 250 MG: at 08:16

## 2017-10-19 RX ADMIN — ENOXAPARIN SODIUM 40 MG: 40 INJECTION SUBCUTANEOUS at 08:16

## 2017-10-19 RX ADMIN — BACLOFEN 10 MG: 10 TABLET ORAL at 08:16

## 2017-10-19 RX ADMIN — FUROSEMIDE 20 MG: 20 TABLET ORAL at 08:16

## 2017-10-19 RX ADMIN — Medication 81 MG: at 08:16

## 2017-10-19 RX ADMIN — DANTROLENE SODIUM 25 MG: 25 CAPSULE ORAL at 08:16

## 2017-10-19 RX ADMIN — FLUOXETINE 10 MG: 10 CAPSULE ORAL at 08:16

## 2017-10-19 RX ADMIN — IPRATROPIUM BROMIDE AND ALBUTEROL SULFATE 3 ML: 2.5; .5 SOLUTION RESPIRATORY (INHALATION) at 06:53

## 2017-10-19 RX ADMIN — PREDNISONE 3 MG: 1 TABLET ORAL at 08:16

## 2017-10-19 RX ADMIN — GABAPENTIN 300 MG: 300 CAPSULE ORAL at 08:16

## 2017-10-19 RX ADMIN — METOPROLOL TARTRATE 12.5 MG: 25 TABLET, FILM COATED ORAL at 08:16

## 2017-10-19 RX ADMIN — NYSTATIN: 100000 POWDER TOPICAL at 08:16

## 2017-10-19 NOTE — PLAN OF CARE
Problem: Patient Care Overview (Adult)  Goal: Plan of Care Review  Outcome: Ongoing (interventions implemented as appropriate)    10/19/17 0252   Coping/Psychosocial Response Interventions   Plan Of Care Reviewed With patient   Patient Care Overview   Progress no change   Outcome Evaluation   Outcome Summary/Follow up Plan Medicated for c/o pain. Turned and repositioned q2hrs. Con't Jevity 1.2. Con't to monitor.       Goal: Adult Individualization and Mutuality  Outcome: Ongoing (interventions implemented as appropriate)  Goal: Discharge Needs Assessment  Outcome: Ongoing (interventions implemented as appropriate)    Problem: Stroke (Ischemic) (Adult)  Goal: Signs and Symptoms of Listed Potential Problems Will be Absent or Manageable (Stroke)  Outcome: Ongoing (interventions implemented as appropriate)    Problem: Fall Risk (Adult)  Goal: Absence of Falls  Outcome: Ongoing (interventions implemented as appropriate)    Problem: Wound, Traumatic, Nonburn (Adult)  Goal: Signs and Symptoms of Listed Potential Problems Will be Absent or Manageable (Wound, Traumatic, Nonburn)  Outcome: Ongoing (interventions implemented as appropriate)    Problem: Pressure Ulcer Risk (Chuy Scale) (Adult,Obstetrics,Pediatric)  Goal: Skin Integrity  Outcome: Ongoing (interventions implemented as appropriate)    Problem: Nutrition, Enteral (Adult)  Goal: Signs and Symptoms of Listed Potential Problems Will be Absent or Manageable (Nutrition, Enteral)  Outcome: Ongoing (interventions implemented as appropriate)

## 2017-10-19 NOTE — PROGRESS NOTES
Continued Stay Note   Maria L     Patient Name: Pallavi Yee  MRN: [id number]  Today's Date: 10/19/2017    Admit Date: 10/4/2017          Discharge Plan       10/19/17 0835    Case Management/Social Work Plan    Plan [facility]    Final Note    Final Note Patient is discharged today to [facility], skilled. Discharge summary, discharge med list, and copy of scripts faxed to [phone]. Patient's nurse will call report to [phone]. [facility] confirmed EMS to transport this AM.               Discharge Codes       10/19/17 0835    Discharge Codes    Discharge Codes 03  Discharged/transferred to skilled nursing facility (SNF) with Medicare certification in anticipation of skilled care        Expected Discharge Date and Time     Expected Discharge Date Expected Discharge Time    Oct 19, 2017             HUI Hayes
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
## 2017-10-19 NOTE — THERAPY DISCHARGE NOTE
Acute Care - Occupational Therapy Discharge Summary  [facility]     Patient Name: Pallavi Yee  : 1980  MRN: [id number]    Today's Date: 10/19/2017  Onset of Illness/Injury or Date of Surgery Date: 10/04/17    Date of Referral to OT: 10/05/17  Referring Physician: Dr. Guillen      Admit Date: 10/4/2017        OT Recommendation and Plan    Visit Dx:    ICD-10-CM ICD-9-CM   1. Oropharyngeal dysphagia R13.12 787.22   2. Mobility impaired Z74.09 799.89   3. Impaired mobility and ADLs Z74.09 799.89   4. Cognitive impairment R41.89 294.9                     OT Goals       10/19/17 1149 10/17/17 1629       Bed Mobility OT LTG    Bed Mobility OT LTG, Date Goal Reviewed 10/19/17  -CJ 10/17/17  -MM     Bed Mobility OT LTG, Outcome goal not met  [facility] goal ongoing  -MM     Bed Mobility OT LTG, Reason Goal Not Met discharged from [facility]  - progress slower than expected  -MM     Range of Motion OT LTG    Range of Motion Goal OT LTG, Date Goal Reviewed  10/17/17  -MM     Range of Motion Goal OT LTG, Outcome goal met  - goal ongoing  -MM     Reason Goal Not Met (ROM) OT LTG discharged from [facility] progress slower than expected  -MM     Eating Self-Feeding OT LTG    Eat Self Feeding Goal OT LTG, Date Goal Review 10/19/17  -CJ 10/17/17  -MM     Eat Self Feeding Goal OT LTG, Outcome goal not met  [facility] goal no longer appropriate  -MM     Eat Self Feeding Goal OT LTG, Reason Not Met discharged from [facility]  - other (see comments)   Pt currently has PEG tube  -MM     Grooming OT LTG    Grooming Goal OT LTG, Date Goal Reviewed 10/19/17  -CJ 10/17/17  -MM     Grooming Goal OT LTG, Outcome goal not met  - goal ongoing  -MM     Grooming Goal OT LTG, Reason Goal Not Met discharged from [facility] progress slower than expected  -MM     Coordination OT LTG    Coordination OT LTG, Date Established  10/17/17  -MM     Coordination OT LTG, Time to Achieve  by discharge  -MM     Coordination OT LTG, Activity Type   written  ex program;FM task;GM task;FM written ex program  -MM     Coordination OT LTG, [place] Level  min assist;with verbal cues  -     Coordination OT LTG, Date Goal Reviewed 10/19/17  -      Coordination OT LTG, Outcome goal not met  -      Coordination OT LTG, Reason Goal Not Met discharged from facility  -        User Key  (r) = Recorded By, (t) = Taken By, (c) = Cosigned By    Initials Name Provider Type     KAREEM Menendez/YEN Occupational Therapy Assistant    AUBREY Ambriz OTR/L Occupational Therapist                Outcome Measures       10/17/17 1600          How much help from another is currently needed...    Putting on and taking off regular lower body clothing? 1  -MM      Bathing (including washing, rinsing, and drying) 1  -MM      Toileting (which includes using toilet bed pan or urinal) 1  -MM      Putting on and taking off regular upper body clothing 2  -MM      Taking care of personal grooming (such as brushing teeth) 2  -MM      Eating meals 1  -MM      Score 8  -MM      Functional Assessment    Outcome Measure Options AM-PAC 6 Clicks Daily Activity (OT)  -MM        User Key  (r) = Recorded By, (t) = Taken By, (c) = Cosigned By    Initials Name Provider Type    AUBREY Ambriz OTR/L Occupational Therapist              OT Discharge Summary  Reason for Discharge: Discharge from facility  Outcomes Achieved: Refer to plan of care for updates on goals achieved  Discharge Destination: [facility]      MEHNAZ Pacheco  10/19/2017

## 2017-10-19 NOTE — PROGRESS NOTES
Nutrition Services    Patient Name:  Pallavi Yee  YOB: 1980  MRN: [id number]  Admit Date:  10/4/2017    Hold TF for duration of transit to [facility]. Restart TF @ 30ml/hr and advance by 10ml q 4 hrs as tolerated to goal rate of 70ml/hr.     Electronically signed by:  Candice Duarte  10/19/17 8:29 AM

## 2017-10-19 NOTE — PLAN OF CARE
Problem: Inpatient Occupational Therapy  Goal: Bed Mobility Goal LTG- OT  Outcome: Unable to achieve outcome(s) by discharge Date Met:  10/19/17    10/05/17 1615 10/19/17 1149   Bed Mobility OT LTG   Bed Mobility OT LTG, Date Established 10/05/17 --    Bed Mobility OT LTG, Time to Achieve by discharge --    Bed Mobility OT LTG, Activity Type all bed mobility --    Bed Mobility OT LTG, [place] Level maximum assist (25% patient effort) --    Bed Mobility OT LTG, Assist Device bed rails --    Bed Mobility OT LTG, Date Goal Reviewed --  10/19/17   Bed Mobility OT LTG, Outcome --  goal not met   Bed Mobility OT LTG, Reason Goal Not Met --  discharged from facility       Goal: Range of Motion Goal LTG- OT  Outcome: Outcome(s) achieved Date Met:  10/19/17    10/05/17 1615 10/17/17 1629 10/19/17 1149   Range of Motion OT LTG   Range of Motion Goal OT LTG, Date Established 10/05/17 --  --    Range of Motion Goal OT LTG, Time to Achieve by discharge --  --    Range of Motion Goal OT LTG, AROM Measure Pt will progress with ROM to L UE/LE to decrease tone and prevent contracture. --  --    Range of Motion Goal OT LTG, Date Goal Reviewed --  10/17/17 --    Range of Motion Goal OT LTG, Outcome --  --  goal met   Reason Goal Not Met (ROM) OT LTG --  --  discharged from facility       Goal: Eating Self-Feeding Goal LTG- OT  Outcome: Unable to achieve outcome(s) by discharge Date Met:  10/19/17    10/05/17 1615 10/19/17 1149   Eating Self-Feeding OT LTG   Eat Self Feeding Goal OT LTG, Date Established 10/05/17 --    Eat Self Feeding Goal OT LTG, Time to Achieve by discharge --    Eat Self Feed Goal OT LTG, [place] Level supervision required;set up required --    Eat Self Feeding Goal OT LTG, Adaptive Equip utensils, large  --    Eat Self Feeding Goal OT LTG, Position reclined, sitting in bed --    Eat Self Feeding Goal OT LTG, Date Goal Review --  10/19/17   Eat Self Feeding Goal OT LTG, Outcome --  goal not met   Eat  Self Feeding Goal OT LTG, Reason Not Met --  discharged from facility       Goal: Grooming Goal LTG- OT  Outcome: Unable to achieve outcome(s) by discharge Date Met:  10/19/17    10/05/17 1615 10/19/17 1149   Grooming OT LTG   Grooming Goal OT LTG, Date Established 10/05/17 --    Grooming Goal OT LTG, Time to Achieve by discharge --    Grooming Goal OT LTG, [place] Level moderate assist (50% patient effort) --    Grooming Goal OT LTG, Position reclined, sitting in bed --    Grooming Goal OT LTG, Date Goal Reviewed --  10/19/17   Grooming Goal OT LTG, Outcome --  goal not met   Grooming Goal OT LTG, Reason Goal Not Met --  discharged from facility       Goal: Coordination Goal LTG- OT  Outcome: Unable to achieve outcome(s) by discharge Date Met:  10/19/17    10/17/17 1629 10/19/17 1149   Coordination OT LTG   Coordination OT LTG, Date Established 10/17/17 --    Coordination OT LTG, Time to Achieve by discharge --    Coordination OT LTG, Activity Type GM written ex program;FM task;GM task;FM written ex program --    Coordination OT LTG, [place] Level min assist;with verbal cues --    Coordination OT LTG, Date Goal Reviewed --  10/19/17   Coordination OT LTG, Outcome --  goal not met   Coordination OT LTG, Reason Goal Not Met --  discharged from facility

## 2017-10-20 NOTE — PLAN OF CARE
Problem: Inpatient SLP  Goal: Cognitive-linguistic-Patient will improve Cognitive-linguistic skills to allow optimal participation in care  Outcome: Unable to achieve outcome(s) by discharge Date Met:  10/20/17    10/16/17 0934 10/18/17 1342 10/20/17 1609   Cognitive Linguistic- Optimal Participation in Care   Cognitive Linguistic Optimal Participation in Care- SLP, Date Established 10/16/17 --  --    Cognitive Linguistic Optimal Participation in Care- SLP, Time to Achieve by discharge --  --    Cognitive Linguistic Optimal Participation in Care- SLP, Activity Level Patient will improve attention skills for increased safety in environment;Patient will improve orientation for increased safety in environment;Patient will improve Executive functioning skills for increased safety in environment --  --    Cognitive Linguistic Optimal Participation in Care- SLP, Date Goal Reviewed --  10/18/17 --    Cognitive Linguistic Optimal Participation in Care- SLP, Outcome --  --  goal not met   Cognitive Linguistic Optimal Participation in Care- SLP, Reason Goal Not Met --  --  discharged from facility       Goal: Receptive Language-Patient will improve receptive language skills to allow optimal participation in care  Outcome: Unable to achieve outcome(s) by discharge Date Met:  10/20/17    10/16/17 0934 10/18/17 1342 10/20/17 1609   Receptive Language- Optimal Participation in Care   Receptive Language Optimal Participation in Care- SLP, Date Established 10/16/17 --  --    Receptive Language Optimal Participation in Care- SLP, Time to Achieve by discharge --  --    Receptive Language Optimal Participation in Care- SLP, Activity Level Patient will improve ability to comprehend words/phrases/sentences;Patient will improve ability to follow directions;Patient will improve ability to comprehend questions --  --    Receptive Language Optimal Participation in Care- SLP, Date Goal Reviewed --  10/18/17 --    Receptive Language Optimal  Participation in Care- SLP, Outcome --  --  goal not met   Receptive Language Optimal Participation in Care- SLP, Reason Goal Not Met --  --  discharged from facility

## 2017-10-20 NOTE — PLAN OF CARE
Problem: Inpatient SLP  Goal: Dysphagia- Patient will improve swallowing skills to begin to take some PO safely  Outcome: Unable to achieve outcome(s) by discharge Date Met:  10/20/17    10/05/17 0910 10/17/17 1105 10/20/17 1608   Begin to Take Some PO Safely   Begin to Take Some PO Safely- SLP, Date Established 10/05/17 --  --    Begin to Take Some PO Safely- SLP, Time to Achieve by discharge --  --    Begin to Take Some PO Safely- SLP, Activity Level Patient will improve oral skills for more efficient eating;Patient will improve timing of pharyngeal response for safer, more efficient swallow --  --    Begin to Take Some PO Safely- SLP, Additional Goal Pt will tolerate PO trials with SLP without s/s of aspiration --  --    Begin to Take Some PO Safely- SLP, Date Goal Reviewed --  10/17/17 --    Begin to Take Some PO Safely- SLP, Outcome --  --  goal not met   Begin to Take Some PO Safely- SLP, Reason Goal Not Met --  --  discharged from facility       Goal: Expressive-Patient will improve expressive language skills to allow optimal participation in care  Outcome: Unable to achieve outcome(s) by discharge Date Met:  10/20/17    10/16/17 0934 10/20/17 1608   Expressive- Optimal Participation in Care   Expressive Optimal Participation in Care- SLP, Date Established 10/16/17 --    Expressive Optimal Participation in Care- SLP, Time to Achieve by discharge --    Expressive Optimal Participation in Care- SLP, Activity Level Patient will improve ability to construct phrase and sentence level responses;Patient will improve connected speech to express thoughts --    Expressive Optimal Participation in Care- SLP, Date Goal Reviewed 10/16/17 --    Expressive Optimal Participation in Care- SLP, Outcome --  goal not met   Expressive Optimal Participation in Care- SLP, Reason Goal Not Met --  discharged from facility

## 2017-10-20 NOTE — THERAPY DISCHARGE NOTE
Acute Care - Speech Language Pathology Discharge Summary  [facility]       Patient Name: Pallavi Yee  : 1980  MRN: [id number]    Today's Date: 10/20/2017  Onset of Illness/Injury or Date of Surgery Date: 10/04/17    Date of Referral to SLP: 10/04/17  Referring Physician: Dr. Guillen        Admit Date: 10/4/2017      SLP Recommendation and Plan  NPO with GEOVANNY Duarte MS CCC-SLP 10/20/2017 4:10 PM    Visit Dx:    ICD-10-CM ICD-9-CM   1. Oropharyngeal dysphagia R13.12 787.22   2. Mobility impaired Z74.09 799.89   3. Impaired mobility and ADLs Z74.09 799.89   4. Cognitive impairment R41.89 294.9                     IP SLP Goals       10/20/17 1609 10/20/17 1608 10/18/17 1342    Begin to Take Some PO Safely    Begin to Take Some PO Safely- SLP, Outcome  goal not met  -CS     Begin to Take Some PO Safely- SLP, Reason Goal Not Met  discharged from facility  -     Receptive Language- Optimal Participation in Care    Receptive Language Optimal Participation in Care- SLP, Date Goal Reviewed   10/18/17  -KW (r) ML (t) KW (c)    Receptive Language Optimal Participation in Care- SLP, Outcome goal not met  -CS      Receptive Language Optimal Participation in Care- SLP, Reason Goal Not Met discharged from facility  -      Expressive- Optimal Participation in Care    Expressive Optimal Participation in Care- SLP, Outcome  goal not met  -CS     Expressive Optimal Participation in Care- SLP, Reason Goal Not Met  discharged from facility  -     Cognitive Linguistic- Optimal Participation in Care    Cognitive Linguistic Optimal Participation in Care- SLP, Date Goal Reviewed   10/18/17  -KW (r) ML (t) KW (c)    Cognitive Linguistic Optimal Participation in Care- SLP, Outcome goal not met  -CS      Cognitive Linguistic Optimal Participation in Care- SLP, Reason Goal Not Met discharged from facility  -CS  progress slower than expected  -KW (r) ML (t) KW (c)      10/17/17 1105 10/16/17 0934 10/13/17 1218    Begin to  Take Some PO Safely    Begin to Take Some PO Safely- SLP, Date Goal Reviewed 10/17/17  -TM  10/13/17  -TM    Receptive Language- Optimal Participation in Care    Receptive Language Optimal Participation in Care- SLP, Date Established  10/16/17  -EA     Receptive Language Optimal Participation in Care- SLP, Time to Achieve  by discharge  -EA     Receptive Language Optimal Participation in Care- SLP, Activity Level  Patient will improve ability to comprehend words/phrases/sentences;Patient will improve ability to follow directions;Patient will improve ability to comprehend questions  -EA     Receptive Language Optimal Participation in Care- SLP, Date Goal Reviewed  10/16/17  -EA     Receptive Language Optimal Participation in Care- SLP, Outcome  goal ongoing  -EA     Expressive- Optimal Participation in Care    Expressive Optimal Participation in Care- SLP, Date Established  10/16/17  -EA     Expressive Optimal Participation in Care- SLP, Time to Achieve  by discharge  -EA     Expressive Optimal Participation in Care- SLP, Activity Level  Patient will improve ability to construct phrase and sentence level responses;Patient will improve connected speech to express thoughts  -EA     Expressive Optimal Participation in Care- SLP, Date Goal Reviewed  10/16/17  -EA     Expressive Optimal Participation in Care- SLP, Outcome  goal ongoing  -EA     Cognitive Linguistic- Optimal Participation in Care    Cognitive Linguistic Optimal Participation in Care- SLP, Date Established  10/16/17  -EA     Cognitive Linguistic Optimal Participation in Care- SLP, Time to Achieve  by discharge  -EA     Cognitive Linguistic Optimal Participation in Care- SLP, Activity Level  Patient will improve attention skills for increased safety in environment;Patient will improve orientation for increased safety in environment;Patient will improve Executive functioning skills for increased safety in environment  -EA     Cognitive Linguistic Optimal  Participation in Care- SLP, Date Goal Reviewed  10/16/17  -EA     Cognitive Linguistic Optimal Participation in Care- SLP, Outcome  goal ongoing  -EA       10/11/17 1253          Begin to Take Some PO Safely    Begin to Take Some PO Safely- SLP, Date Goal Reviewed 10/11/17  -APOLINAR        User Key  (r) = Recorded By, (t) = Taken By, (c) = Cosigned By    Initials Name Provider Type    TM Fatuma Leger, CCC-SLP Speech and Language Pathologist    APOLINAR Leong, MS CCC-SLP Speech and Language Pathologist    BEATRICE Duarte, MS CCC-SLP Speech and Language Pathologist    CRESCENCIO Hester, MS, CFY-SLP Speech and Language Pathologist    ML Elvia Chung, Speech Therapy Student Speech Therapy Student                  SLP Discharge Summary  Anticipated Discharge Disposition: skilled nursing facility  Reason for Discharge: Discharge from facility  Outcomes Achieved: Unable to make functional progress toward goals at this time  Discharge Destination: SNF      Guzman Duarte MS CCC-SLP  10/20/2017

## (undated) DEVICE — TBG SMPL FLTR LINE NASL 02/C02 A/ BX/100

## (undated) DEVICE — CUFF,BP,DISP,1 TUBE,ADULT,HP: Brand: MEDLINE

## (undated) DEVICE — SENSR O2 OXIMAX FNGR A/ 18IN NONSTR

## (undated) DEVICE — Device: Brand: DEFENDO AIR/WATER/SUCTION AND BIOPSY VALVE

## (undated) DEVICE — ENDOGATOR AUXILIARY WATER JET CONNECTOR: Brand: ENDOGATOR

## (undated) DEVICE — BNDR ABD 4PANEL 12IN 46 TO 62IN

## (undated) DEVICE — THE CHANNEL CLEANING BRUSH IS A NYLON FLEXI BRUSH ATTACHED TO A FLEXIBLE PLASTIC SHEATH DESIGNED TO SAFELY REMOVE DEBRIS FROM FLEXIBLE ENDOSCOPES.

## (undated) DEVICE — CONMED SCOPE SAVER BITE BLOCK, 20X27 MM: Brand: SCOPE SAVER